# Patient Record
Sex: MALE | Race: WHITE | Employment: OTHER
[De-identification: names, ages, dates, MRNs, and addresses within clinical notes are randomized per-mention and may not be internally consistent; named-entity substitution may affect disease eponyms.]

---

## 2017-01-20 DIAGNOSIS — I25.10 CORONARY ARTERY DISEASE INVOLVING NATIVE CORONARY ARTERY OF NATIVE HEART, ANGINA PRESENCE UNSPECIFIED: ICD-10-CM

## 2017-01-20 DIAGNOSIS — I10 ESSENTIAL HYPERTENSION: ICD-10-CM

## 2017-01-20 RX ORDER — CAPTOPRIL 25 MG/1
TABLET ORAL
Qty: 270 TABLET | Refills: 2 | Status: SHIPPED | OUTPATIENT
Start: 2017-01-20 | End: 2017-10-17 | Stop reason: SDUPTHER

## 2017-02-01 ENCOUNTER — OFFICE VISIT (OUTPATIENT)
Dept: INTERNAL MEDICINE | Facility: CLINIC | Age: 72
End: 2017-02-01

## 2017-02-01 VITALS
RESPIRATION RATE: 14 BRPM | HEIGHT: 71 IN | HEART RATE: 56 BPM | WEIGHT: 253 LBS | DIASTOLIC BLOOD PRESSURE: 60 MMHG | SYSTOLIC BLOOD PRESSURE: 102 MMHG | BODY MASS INDEX: 35.42 KG/M2

## 2017-02-01 DIAGNOSIS — M51.36 DDD (DEGENERATIVE DISC DISEASE), LUMBAR: ICD-10-CM

## 2017-02-01 DIAGNOSIS — Z72.0 TOBACCO USE: ICD-10-CM

## 2017-02-01 DIAGNOSIS — K21.9 GASTROESOPHAGEAL REFLUX DISEASE WITHOUT ESOPHAGITIS: ICD-10-CM

## 2017-02-01 DIAGNOSIS — M1A.0790 IDIOPATHIC CHRONIC GOUT OF FOOT WITHOUT TOPHUS, UNSPECIFIED LATERALITY: ICD-10-CM

## 2017-02-01 DIAGNOSIS — E11.21 DIABETIC NEPHROPATHY WITH PROTEINURIA (HCC): ICD-10-CM

## 2017-02-01 DIAGNOSIS — M96.1 POST LAMINECTOMY SYNDROME: ICD-10-CM

## 2017-02-01 DIAGNOSIS — Z23 NEED FOR VACCINATION WITH 13-POLYVALENT PNEUMOCOCCAL CONJUGATE VACCINE: ICD-10-CM

## 2017-02-01 DIAGNOSIS — D75.89 MACROCYTOSIS WITHOUT ANEMIA: ICD-10-CM

## 2017-02-01 DIAGNOSIS — H93.90 EAR LESION: ICD-10-CM

## 2017-02-01 DIAGNOSIS — G47.33 OBSTRUCTIVE SLEEP APNEA SYNDROME: ICD-10-CM

## 2017-02-01 DIAGNOSIS — I25.10 CORONARY ARTERY DISEASE INVOLVING NATIVE HEART, ANGINA PRESENCE UNSPECIFIED, UNSPECIFIED VESSEL OR LESION TYPE: ICD-10-CM

## 2017-02-01 DIAGNOSIS — J42 CHRONIC BRONCHITIS, UNSPECIFIED CHRONIC BRONCHITIS TYPE (HCC): ICD-10-CM

## 2017-02-01 DIAGNOSIS — I25.10 CORONARY ARTERY DISEASE INVOLVING NATIVE CORONARY ARTERY OF NATIVE HEART WITHOUT ANGINA PECTORIS: ICD-10-CM

## 2017-02-01 DIAGNOSIS — I71.40 ABDOMINAL AORTIC ANEURYSM (AAA) WITHOUT RUPTURE: ICD-10-CM

## 2017-02-01 DIAGNOSIS — E11.21 TYPE 2 DIABETES WITH NEPHROPATHY (HCC): Primary | ICD-10-CM

## 2017-02-01 DIAGNOSIS — R13.12 OROPHARYNGEAL DYSPHAGIA: ICD-10-CM

## 2017-02-01 DIAGNOSIS — I25.10 CORONARY ARTERY DISEASE INVOLVING NATIVE CORONARY ARTERY OF NATIVE HEART, ANGINA PRESENCE UNSPECIFIED: ICD-10-CM

## 2017-02-01 DIAGNOSIS — I10 ESSENTIAL HYPERTENSION: ICD-10-CM

## 2017-02-01 LAB — HBA1C MFR BLD: 7.9 %

## 2017-02-01 PROCEDURE — 3017F COLORECTAL CA SCREEN DOC REV: CPT | Performed by: INTERNAL MEDICINE

## 2017-02-01 PROCEDURE — 99214 OFFICE O/P EST MOD 30 MIN: CPT | Performed by: INTERNAL MEDICINE

## 2017-02-01 PROCEDURE — 90670 PCV13 VACCINE IM: CPT | Performed by: INTERNAL MEDICINE

## 2017-02-01 PROCEDURE — G8427 DOCREV CUR MEDS BY ELIG CLIN: HCPCS | Performed by: INTERNAL MEDICINE

## 2017-02-01 PROCEDURE — 1123F ACP DISCUSS/DSCN MKR DOCD: CPT | Performed by: INTERNAL MEDICINE

## 2017-02-01 PROCEDURE — 83036 HEMOGLOBIN GLYCOSYLATED A1C: CPT | Performed by: INTERNAL MEDICINE

## 2017-02-01 PROCEDURE — 4004F PT TOBACCO SCREEN RCVD TLK: CPT | Performed by: INTERNAL MEDICINE

## 2017-02-01 PROCEDURE — G8598 ASA/ANTIPLAT THER USED: HCPCS | Performed by: INTERNAL MEDICINE

## 2017-02-01 PROCEDURE — G0009 ADMIN PNEUMOCOCCAL VACCINE: HCPCS | Performed by: INTERNAL MEDICINE

## 2017-02-01 PROCEDURE — G8926 SPIRO NO PERF OR DOC: HCPCS | Performed by: INTERNAL MEDICINE

## 2017-02-01 PROCEDURE — G8484 FLU IMMUNIZE NO ADMIN: HCPCS | Performed by: INTERNAL MEDICINE

## 2017-02-01 PROCEDURE — 4040F PNEUMOC VAC/ADMIN/RCVD: CPT | Performed by: INTERNAL MEDICINE

## 2017-02-01 PROCEDURE — G8419 CALC BMI OUT NRM PARAM NOF/U: HCPCS | Performed by: INTERNAL MEDICINE

## 2017-02-01 PROCEDURE — 3023F SPIROM DOC REV: CPT | Performed by: INTERNAL MEDICINE

## 2017-02-01 PROCEDURE — 3045F PR MOST RECENT HEMOGLOBIN A1C LEVEL 7.0-9.0%: CPT | Performed by: INTERNAL MEDICINE

## 2017-02-01 RX ORDER — ISOSORBIDE MONONITRATE 60 MG/1
TABLET, EXTENDED RELEASE ORAL
Qty: 90 TABLET | Refills: 3 | Status: SHIPPED | OUTPATIENT
Start: 2017-02-01 | End: 2017-11-27 | Stop reason: SDUPTHER

## 2017-02-01 RX ORDER — POTASSIUM CHLORIDE 20 MEQ/1
20 TABLET, EXTENDED RELEASE ORAL DAILY
Qty: 90 TABLET | Refills: 3 | Status: SHIPPED | OUTPATIENT
Start: 2017-02-01 | End: 2018-02-21 | Stop reason: SDUPTHER

## 2017-02-01 RX ORDER — OXYMORPHONE HYDROCHLORIDE 20 MG/1
20 TABLET, FILM COATED, EXTENDED RELEASE ORAL 3 TIMES DAILY
COMMUNITY
Start: 2017-01-28 | End: 2017-09-25 | Stop reason: ALTCHOICE

## 2017-02-01 RX ORDER — METFORMIN HYDROCHLORIDE 500 MG/1
500 TABLET, EXTENDED RELEASE ORAL 2 TIMES DAILY
Qty: 180 TABLET | Refills: 3 | Status: SHIPPED | OUTPATIENT
Start: 2017-02-01 | End: 2018-01-22 | Stop reason: SDUPTHER

## 2017-02-01 RX ORDER — RANOLAZINE 500 MG/1
500 TABLET, EXTENDED RELEASE ORAL 2 TIMES DAILY
Qty: 180 TABLET | Refills: 3 | Status: SHIPPED | OUTPATIENT
Start: 2017-02-01 | End: 2018-02-14 | Stop reason: SDUPTHER

## 2017-02-01 RX ORDER — VITAMIN E (DL,TOCOPHERYL ACET) 180 MG
500 CAPSULE ORAL DAILY
Qty: 90 CAPSULE | Refills: 3 | Status: SHIPPED | OUTPATIENT
Start: 2017-02-01 | End: 2018-01-30 | Stop reason: CLARIF

## 2017-02-01 ASSESSMENT — PATIENT HEALTH QUESTIONNAIRE - PHQ9
1. LITTLE INTEREST OR PLEASURE IN DOING THINGS: 0
SUM OF ALL RESPONSES TO PHQ9 QUESTIONS 1 & 2: 0
SUM OF ALL RESPONSES TO PHQ QUESTIONS 1-9: 0
2. FEELING DOWN, DEPRESSED OR HOPELESS: 0

## 2017-02-01 ASSESSMENT — ENCOUNTER SYMPTOMS
HEARTBURN: 0
NAUSEA: 0
VOMITING: 0
CHEST TIGHTNESS: 0
BACK PAIN: 1
COUGH: 0
SHORTNESS OF BREATH: 0
BLOOD IN STOOL: 0

## 2017-02-08 ENCOUNTER — HOSPITAL ENCOUNTER (OUTPATIENT)
Dept: GENERAL RADIOLOGY | Age: 72
Discharge: HOME | End: 2017-02-08
Payer: MEDICARE

## 2017-02-08 ENCOUNTER — HOSPITAL ENCOUNTER (OUTPATIENT)
Age: 72
Discharge: HOME | End: 2017-02-08
Payer: MEDICARE

## 2017-02-08 DIAGNOSIS — M51.36 DDD (DEGENERATIVE DISC DISEASE), LUMBAR: ICD-10-CM

## 2017-02-08 DIAGNOSIS — M96.1 POST LAMINECTOMY SYNDROME: ICD-10-CM

## 2017-02-08 PROCEDURE — 72110 X-RAY EXAM L-2 SPINE 4/>VWS: CPT

## 2017-03-02 RX ORDER — ISOSORBIDE MONONITRATE 60 MG/1
TABLET, EXTENDED RELEASE ORAL
Qty: 90 TABLET | Refills: 2 | Status: SHIPPED | OUTPATIENT
Start: 2017-03-02 | End: 2017-11-27 | Stop reason: SDUPTHER

## 2017-04-24 PROBLEM — H61.009 CHONDRODERMATITIS NODULARIS CHRONICA HELICIS: Status: ACTIVE | Noted: 2017-04-24

## 2017-04-25 DIAGNOSIS — J42 CHRONIC BRONCHITIS, UNSPECIFIED CHRONIC BRONCHITIS TYPE (HCC): ICD-10-CM

## 2017-05-04 PROBLEM — L57.0 ACTINIC KERATOSIS: Status: ACTIVE | Noted: 2017-05-04

## 2017-06-02 ENCOUNTER — TELEPHONE (OUTPATIENT)
Dept: PRIMARY CARE CLINIC | Age: 72
End: 2017-06-02

## 2017-06-02 DIAGNOSIS — I71.40 ABDOMINAL AORTIC ANEURYSM (AAA) WITHOUT RUPTURE: Primary | ICD-10-CM

## 2017-06-02 DIAGNOSIS — M51.36 DDD (DEGENERATIVE DISC DISEASE), LUMBAR: ICD-10-CM

## 2017-06-02 DIAGNOSIS — M96.1 POST LAMINECTOMY SYNDROME: ICD-10-CM

## 2017-06-02 DIAGNOSIS — M16.0 PRIMARY OSTEOARTHRITIS OF BOTH HIPS: ICD-10-CM

## 2017-06-02 DIAGNOSIS — Z95.1 HX OF CABG: ICD-10-CM

## 2017-06-05 PROBLEM — D48.5 NEOPLASM OF UNCERTAIN BEHAVIOR OF SKIN: Status: ACTIVE | Noted: 2017-06-05

## 2017-06-06 ENCOUNTER — OFFICE VISIT (OUTPATIENT)
Dept: PRIMARY CARE CLINIC | Age: 72
End: 2017-06-06
Payer: MEDICARE

## 2017-06-06 VITALS
DIASTOLIC BLOOD PRESSURE: 68 MMHG | BODY MASS INDEX: 32.65 KG/M2 | SYSTOLIC BLOOD PRESSURE: 118 MMHG | HEART RATE: 62 BPM | RESPIRATION RATE: 16 BRPM | WEIGHT: 233.2 LBS | HEIGHT: 71 IN

## 2017-06-06 DIAGNOSIS — M51.36 DDD (DEGENERATIVE DISC DISEASE), LUMBAR: ICD-10-CM

## 2017-06-06 DIAGNOSIS — E66.9 OBESITY (BMI 30-39.9): ICD-10-CM

## 2017-06-06 DIAGNOSIS — Z72.0 TOBACCO USE: ICD-10-CM

## 2017-06-06 DIAGNOSIS — I71.40 ABDOMINAL AORTIC ANEURYSM (AAA) WITHOUT RUPTURE: ICD-10-CM

## 2017-06-06 DIAGNOSIS — I25.10 CORONARY ARTERY DISEASE INVOLVING NATIVE CORONARY ARTERY OF NATIVE HEART WITHOUT ANGINA PECTORIS: ICD-10-CM

## 2017-06-06 DIAGNOSIS — E66.09 NON MORBID OBESITY DUE TO EXCESS CALORIES: ICD-10-CM

## 2017-06-06 DIAGNOSIS — Z86.79 HISTORY OF CONGESTIVE HEART FAILURE: ICD-10-CM

## 2017-06-06 DIAGNOSIS — I10 ESSENTIAL HYPERTENSION: ICD-10-CM

## 2017-06-06 DIAGNOSIS — M16.0 PRIMARY OSTEOARTHRITIS OF BOTH HIPS: ICD-10-CM

## 2017-06-06 DIAGNOSIS — G47.33 OBSTRUCTIVE SLEEP APNEA SYNDROME: ICD-10-CM

## 2017-06-06 DIAGNOSIS — H61.001 CHONDRODERMATITIS NODULARIS CHRONICA HELICIS, RIGHT: ICD-10-CM

## 2017-06-06 DIAGNOSIS — E11.21 DIABETIC NEPHROPATHY WITH PROTEINURIA (HCC): ICD-10-CM

## 2017-06-06 DIAGNOSIS — M96.1 POST LAMINECTOMY SYNDROME: ICD-10-CM

## 2017-06-06 DIAGNOSIS — K63.5 COLON POLYPS: ICD-10-CM

## 2017-06-06 DIAGNOSIS — K21.9 GASTROESOPHAGEAL REFLUX DISEASE WITHOUT ESOPHAGITIS: ICD-10-CM

## 2017-06-06 DIAGNOSIS — I34.0 MITRAL VALVE INSUFFICIENCY, UNSPECIFIED ETIOLOGY: ICD-10-CM

## 2017-06-06 DIAGNOSIS — E11.21 TYPE 2 DIABETES WITH NEPHROPATHY (HCC): ICD-10-CM

## 2017-06-06 LAB — HBA1C MFR BLD: 12.2 %

## 2017-06-06 PROCEDURE — 99214 OFFICE O/P EST MOD 30 MIN: CPT | Performed by: INTERNAL MEDICINE

## 2017-06-06 PROCEDURE — 83036 HEMOGLOBIN GLYCOSYLATED A1C: CPT | Performed by: INTERNAL MEDICINE

## 2017-06-06 PROCEDURE — 1123F ACP DISCUSS/DSCN MKR DOCD: CPT | Performed by: INTERNAL MEDICINE

## 2017-06-06 PROCEDURE — 3017F COLORECTAL CA SCREEN DOC REV: CPT | Performed by: INTERNAL MEDICINE

## 2017-06-06 PROCEDURE — G8417 CALC BMI ABV UP PARAM F/U: HCPCS | Performed by: INTERNAL MEDICINE

## 2017-06-06 PROCEDURE — G8427 DOCREV CUR MEDS BY ELIG CLIN: HCPCS | Performed by: INTERNAL MEDICINE

## 2017-06-06 PROCEDURE — 4040F PNEUMOC VAC/ADMIN/RCVD: CPT | Performed by: INTERNAL MEDICINE

## 2017-06-06 PROCEDURE — 4004F PT TOBACCO SCREEN RCVD TLK: CPT | Performed by: INTERNAL MEDICINE

## 2017-06-06 PROCEDURE — G8598 ASA/ANTIPLAT THER USED: HCPCS | Performed by: INTERNAL MEDICINE

## 2017-06-06 PROCEDURE — 3046F HEMOGLOBIN A1C LEVEL >9.0%: CPT | Performed by: INTERNAL MEDICINE

## 2017-06-06 ASSESSMENT — ENCOUNTER SYMPTOMS
BACK PAIN: 1
VOMITING: 0
VISUAL CHANGE: 0
BLOOD IN STOOL: 0
NAUSEA: 0
SHORTNESS OF BREATH: 0
COUGH: 0

## 2017-06-08 ENCOUNTER — TELEPHONE (OUTPATIENT)
Dept: PRIMARY CARE CLINIC | Age: 72
End: 2017-06-08

## 2017-06-08 DIAGNOSIS — E11.21 TYPE 2 DIABETES WITH NEPHROPATHY (HCC): Primary | ICD-10-CM

## 2017-06-08 DIAGNOSIS — E11.21 DIABETIC NEPHROPATHY WITH PROTEINURIA (HCC): Primary | ICD-10-CM

## 2017-06-08 NOTE — TELEPHONE ENCOUNTER
Patient needs glucometer, order pending, please print and sign. I can give it to him tomorrow when he comes in for diabetic EDU.

## 2017-06-09 DIAGNOSIS — I25.10 CORONARY ARTERY DISEASE INVOLVING NATIVE HEART, ANGINA PRESENCE UNSPECIFIED, UNSPECIFIED VESSEL OR LESION TYPE: ICD-10-CM

## 2017-06-09 DIAGNOSIS — E11.21 TYPE 2 DIABETES WITH NEPHROPATHY (HCC): Primary | ICD-10-CM

## 2017-06-09 RX ORDER — FUROSEMIDE 80 MG
TABLET ORAL
Qty: 90 TABLET | Refills: 1 | Status: SHIPPED | OUTPATIENT
Start: 2017-06-09 | End: 2017-12-06 | Stop reason: SDUPTHER

## 2017-06-12 ENCOUNTER — TELEPHONE (OUTPATIENT)
Dept: PRIMARY CARE CLINIC | Age: 72
End: 2017-06-12

## 2017-06-12 DIAGNOSIS — E11.21 TYPE II DIABETES MELLITUS WITH NEPHROPATHY (HCC): Primary | ICD-10-CM

## 2017-06-12 RX ORDER — SYRING-NEEDL,DISP,INSUL,0.3 ML 30 GX5/16"
1 SYRINGE, EMPTY DISPOSABLE MISCELLANEOUS 3 TIMES DAILY
Qty: 1 DEVICE | Refills: 0 | Status: SHIPPED | OUTPATIENT
Start: 2017-06-12 | End: 2020-05-07 | Stop reason: SDUPTHER

## 2017-06-12 RX ORDER — LANCETS 30 GAUGE
EACH MISCELLANEOUS
Qty: 300 EACH | Refills: 3 | Status: SHIPPED | OUTPATIENT
Start: 2017-06-12 | End: 2018-07-24 | Stop reason: SDUPTHER

## 2017-06-12 RX ORDER — GLUCOSAMINE HCL/CHONDROITIN SU 500-400 MG
CAPSULE ORAL
Qty: 300 STRIP | Refills: 3 | Status: SHIPPED | OUTPATIENT
Start: 2017-06-12 | End: 2018-06-25 | Stop reason: SDUPTHER

## 2017-06-13 ENCOUNTER — CARE COORDINATION (OUTPATIENT)
Dept: PRIMARY CARE CLINIC | Age: 72
End: 2017-06-13

## 2017-06-30 ENCOUNTER — TELEPHONE (OUTPATIENT)
Dept: PRIMARY CARE CLINIC | Age: 72
End: 2017-06-30

## 2017-08-27 DIAGNOSIS — M1A.0710 IDIOPATHIC CHRONIC GOUT OF RIGHT FOOT WITHOUT TOPHUS: ICD-10-CM

## 2017-08-28 RX ORDER — ALLOPURINOL 100 MG/1
TABLET ORAL
Qty: 90 TABLET | Refills: 1 | Status: SHIPPED | OUTPATIENT
Start: 2017-08-28 | End: 2018-02-21 | Stop reason: SDUPTHER

## 2017-08-29 DIAGNOSIS — I25.10 CORONARY ARTERY DISEASE INVOLVING NATIVE HEART, ANGINA PRESENCE UNSPECIFIED, UNSPECIFIED VESSEL OR LESION TYPE: ICD-10-CM

## 2017-08-29 DIAGNOSIS — E11.21 TYPE 2 DIABETES MELLITUS WITH DIABETIC NEPHROPATHY (HCC): ICD-10-CM

## 2017-08-29 RX ORDER — ATORVASTATIN CALCIUM 40 MG/1
TABLET, FILM COATED ORAL
Qty: 90 TABLET | Refills: 1 | Status: SHIPPED | OUTPATIENT
Start: 2017-08-29 | End: 2018-02-21 | Stop reason: SDUPTHER

## 2017-08-29 RX ORDER — ATENOLOL 25 MG/1
TABLET ORAL
Qty: 90 TABLET | Refills: 1 | Status: SHIPPED | OUTPATIENT
Start: 2017-08-29 | End: 2018-02-21 | Stop reason: SDUPTHER

## 2017-09-22 DIAGNOSIS — K21.9 GASTROESOPHAGEAL REFLUX DISEASE, ESOPHAGITIS PRESENCE NOT SPECIFIED: ICD-10-CM

## 2017-09-22 RX ORDER — LANSOPRAZOLE 30 MG/1
30 CAPSULE, DELAYED RELEASE ORAL DAILY
Qty: 90 CAPSULE | Refills: 1 | Status: SHIPPED | OUTPATIENT
Start: 2017-09-22 | End: 2018-03-26 | Stop reason: SDUPTHER

## 2017-09-28 ENCOUNTER — CARE COORDINATION (OUTPATIENT)
Dept: CARE COORDINATION | Age: 72
End: 2017-09-28

## 2017-10-16 ENCOUNTER — OFFICE VISIT (OUTPATIENT)
Dept: PRIMARY CARE CLINIC | Age: 72
End: 2017-10-16
Payer: MEDICARE

## 2017-10-16 VITALS
WEIGHT: 229.6 LBS | BODY MASS INDEX: 32.14 KG/M2 | HEIGHT: 71 IN | RESPIRATION RATE: 16 BRPM | SYSTOLIC BLOOD PRESSURE: 102 MMHG | HEART RATE: 68 BPM | DIASTOLIC BLOOD PRESSURE: 62 MMHG

## 2017-10-16 DIAGNOSIS — E66.9 OBESITY (BMI 30-39.9): ICD-10-CM

## 2017-10-16 DIAGNOSIS — Z72.0 TOBACCO USE: ICD-10-CM

## 2017-10-16 DIAGNOSIS — E11.21 TYPE 2 DIABETES WITH NEPHROPATHY (HCC): Primary | ICD-10-CM

## 2017-10-16 DIAGNOSIS — E78.5 HYPERLIPIDEMIA, UNSPECIFIED HYPERLIPIDEMIA TYPE: ICD-10-CM

## 2017-10-16 DIAGNOSIS — M51.36 DDD (DEGENERATIVE DISC DISEASE), LUMBAR: ICD-10-CM

## 2017-10-16 DIAGNOSIS — M96.1 POST LAMINECTOMY SYNDROME: ICD-10-CM

## 2017-10-16 DIAGNOSIS — I71.40 ABDOMINAL AORTIC ANEURYSM (AAA) WITHOUT RUPTURE: ICD-10-CM

## 2017-10-16 DIAGNOSIS — M16.0 PRIMARY OSTEOARTHRITIS OF BOTH HIPS: ICD-10-CM

## 2017-10-16 DIAGNOSIS — L57.0 ACTINIC KERATOSIS: ICD-10-CM

## 2017-10-16 DIAGNOSIS — Z95.1 HX OF CABG: ICD-10-CM

## 2017-10-16 DIAGNOSIS — I25.10 CORONARY ARTERY DISEASE INVOLVING NATIVE HEART, ANGINA PRESENCE UNSPECIFIED, UNSPECIFIED VESSEL OR LESION TYPE: ICD-10-CM

## 2017-10-16 DIAGNOSIS — M1A.0710 IDIOPATHIC CHRONIC GOUT OF RIGHT FOOT WITHOUT TOPHUS: ICD-10-CM

## 2017-10-16 DIAGNOSIS — E66.09 NON MORBID OBESITY DUE TO EXCESS CALORIES: ICD-10-CM

## 2017-10-16 DIAGNOSIS — K21.9 GASTROESOPHAGEAL REFLUX DISEASE WITHOUT ESOPHAGITIS: ICD-10-CM

## 2017-10-16 DIAGNOSIS — I25.10 CORONARY ARTERY DISEASE INVOLVING NATIVE CORONARY ARTERY OF NATIVE HEART WITHOUT ANGINA PECTORIS: ICD-10-CM

## 2017-10-16 DIAGNOSIS — E11.21 DIABETIC NEPHROPATHY WITH PROTEINURIA (HCC): ICD-10-CM

## 2017-10-16 DIAGNOSIS — I10 ESSENTIAL HYPERTENSION: ICD-10-CM

## 2017-10-16 DIAGNOSIS — I50.22 HEART FAILURE, SYSTOLIC, CHRONIC (HCC): ICD-10-CM

## 2017-10-16 DIAGNOSIS — G47.31 CENTRAL SLEEP APNEA: ICD-10-CM

## 2017-10-16 DIAGNOSIS — I34.0 NON-RHEUMATIC MITRAL REGURGITATION: ICD-10-CM

## 2017-10-16 LAB — HBA1C MFR BLD: 6.2 %

## 2017-10-16 PROCEDURE — 3017F COLORECTAL CA SCREEN DOC REV: CPT | Performed by: INTERNAL MEDICINE

## 2017-10-16 PROCEDURE — 83036 HEMOGLOBIN GLYCOSYLATED A1C: CPT | Performed by: INTERNAL MEDICINE

## 2017-10-16 PROCEDURE — 4040F PNEUMOC VAC/ADMIN/RCVD: CPT | Performed by: INTERNAL MEDICINE

## 2017-10-16 PROCEDURE — G8427 DOCREV CUR MEDS BY ELIG CLIN: HCPCS | Performed by: INTERNAL MEDICINE

## 2017-10-16 PROCEDURE — 3046F HEMOGLOBIN A1C LEVEL >9.0%: CPT | Performed by: INTERNAL MEDICINE

## 2017-10-16 PROCEDURE — G8598 ASA/ANTIPLAT THER USED: HCPCS | Performed by: INTERNAL MEDICINE

## 2017-10-16 PROCEDURE — 99214 OFFICE O/P EST MOD 30 MIN: CPT | Performed by: INTERNAL MEDICINE

## 2017-10-16 PROCEDURE — 1123F ACP DISCUSS/DSCN MKR DOCD: CPT | Performed by: INTERNAL MEDICINE

## 2017-10-16 PROCEDURE — G8482 FLU IMMUNIZE ORDER/ADMIN: HCPCS | Performed by: INTERNAL MEDICINE

## 2017-10-16 PROCEDURE — 4004F PT TOBACCO SCREEN RCVD TLK: CPT | Performed by: INTERNAL MEDICINE

## 2017-10-16 PROCEDURE — G8417 CALC BMI ABV UP PARAM F/U: HCPCS | Performed by: INTERNAL MEDICINE

## 2017-10-16 ASSESSMENT — ENCOUNTER SYMPTOMS
VOMITING: 0
SHORTNESS OF BREATH: 1
BLOOD IN STOOL: 0
COUGH: 0
HEARTBURN: 1
BACK PAIN: 1
DIARRHEA: 1
NAUSEA: 0
ROS SKIN COMMENTS: NO NEW SKIN LESIONS

## 2017-10-16 NOTE — PROGRESS NOTES
 Heart Disease Brother     Asthma Maternal Grandmother     Asthma Paternal Grandmother        Social History   Substance Use Topics    Smoking status: Current Every Day Smoker     Packs/day: 1.00     Years: 40.00     Types: Cigarettes    Smokeless tobacco: Never Used    Alcohol use No      Current Outpatient Prescriptions   Medication Sig Dispense Refill    lansoprazole (PREVACID) 30 MG delayed release capsule Take 1 capsule by mouth daily 90 capsule 1    atorvastatin (LIPITOR) 40 MG tablet TAKE ONE TABLET BY MOUTH DAILY 90 tablet 1    atenolol (TENORMIN) 25 MG tablet TAKE ONE TABLET BY MOUTH DAILY 90 tablet 1    allopurinol (ZYLOPRIM) 100 MG tablet TAKE ONE TABLET BY MOUTH DAILY 90 tablet 1    Blood Glucose Monitoring Suppl ROMY Glucometer- Free Style Lite 1 Device 0    Lancet Device MISC 1 Device by Miscell. (Med.Supl.;Non-Drugs) route 3 times daily To check blood sugars. Free Style Lite 1 Device 0    Lancets MISC Free Style Lite, Use 3 daily. Insulin Dependent 300 each 3    Glucose Blood (BLOOD GLUCOSE TEST STRIPS) STRP Free Style Lite, Use 3 daily.  Insulin Dependent 300 strip 3    furosemide (LASIX) 80 MG tablet TAKE ONE TABLET BY MOUTH DAILY 90 tablet 1    Insulin Pen Needle 32G X 4 MM MISC 1 each by Does not apply route daily 100 each 3    DULERA 100-5 MCG/ACT inhaler INHALE TWO PUFFS BY MOUTH TWICE A DAY 4 Inhaler 3    isosorbide mononitrate (IMDUR) 60 MG extended release tablet TAKE ONE TABLET BY MOUTH DAILY 90 tablet 2    levofloxacin (LEVAQUIN) 500 MG tablet       metFORMIN (GLUCOPHAGE XR) 500 MG extended release tablet Take 1 tablet by mouth 2 times daily 180 tablet 3    potassium chloride (KLOR-CON M20) 20 MEQ extended release tablet Take 1 tablet by mouth daily 90 tablet 3    ranolazine (RANEXA) 500 MG extended release tablet Take 1 tablet by mouth 2 times daily 180 tablet 3    Magnesium Oxide 500 MG CAPS Take 500 mg by mouth daily 90 capsule 3    isosorbide mononitrate (IMDUR) 60 MG extended release tablet TAKE ONE TABLET BY MOUTH DAILY 90 tablet 3    captopril (CAPOTEN) 25 MG tablet TAKE ONE TABLET BY MOUTH THREE TIMES A  tablet 2    benzonatate (TESSALON PERLES) 100 MG capsule Take 1 capsule by mouth 3 times daily as needed for Cough 30 capsule 0    budesonide-formoterol (SYMBICORT) 160-4.5 MCG/ACT AERO Inhale 2 puffs into the lungs 2 times daily 1 Inhaler 3    fluticasone-salmeterol (ADVAIR DISKUS) 250-50 MCG/DOSE AEPB Inhale 1 puff into the lungs every 12 hours 60 each 3    morphine sulfate ER (MS CONTIN) 30 MG CR tablet       aspirin 325 MG tablet Take 325 mg by mouth daily. No current facility-administered medications for this visit. Allergies   Allergen Reactions    Baclofen     Iodides     Methadone        Health Maintenance   Topic Date Due    Lipid screen  11/18/2016    Diabetic retinal exam  04/12/2017    DTaP/Tdap/Td vaccine (1 - Tdap) 02/14/2018 (Originally 11/25/1964)    Diabetic foot exam  02/01/2018    Pneumococcal low/med risk (2 of 2 - PPSV23) 02/01/2018    Diabetic hemoglobin A1C test  06/06/2018    Colon cancer screen colonoscopy  05/13/2021    Zostavax vaccine  Addressed    Flu vaccine  Completed       Subjective:      Review of Systems   Constitutional: Positive for unexpected weight change (Continues to lose weight via dietary efforts). Respiratory: Positive for shortness of breath (Chronic dyspnea on exertion). Negative for cough. Cardiovascular: Negative for chest pain and palpitations. Gastrointestinal: Positive for diarrhea (Occasional loose stool) and heartburn (Rare). Negative for blood in stool, nausea and vomiting. Endocrine: Negative for polydipsia, polyphagia and polyuria. Genitourinary: Negative for difficulty urinating and hematuria. Musculoskeletal: Positive for arthralgias, back pain and myalgias.         The patient has chronic arthralgias and myalgias   Continues with pain management   Skin: No new skin lesions       Objective:     Physical Exam   Constitutional: He is oriented to person, place, and time. No distress. HENT:   Head: Normocephalic. Nose: Nose normal.   Eyes: Conjunctivae are normal. No scleral icterus. Neck: Neck supple. No tracheal deviation present. No thyromegaly present. Cardiovascular: Normal rate and regular rhythm. Pulmonary/Chest: Effort normal and breath sounds normal. He has no wheezes. He has no rales. Abdominal: Soft. Bowel sounds are normal. He exhibits no distension. There is no tenderness. Musculoskeletal: He exhibits no edema or tenderness. Lymphadenopathy:     He has no cervical adenopathy. Neurological: He is alert and oriented to person, place, and time. Skin: Skin is warm and dry. He is not diaphoretic. No skin lesions  His ear has healed well   Psychiatric: He has a normal mood and affect. Judgment normal.   Vitals reviewed. Diabetic Foot Exam was completed:  Sensation intact  Color and temp satisfactory  Pulses present / capillary fill satisfactory   Nails trimmed, no onychomycosis   No wounds, pressure sores or rash    Quality & Risk Score Accuracy - MEDICARE ADVANTAGE    Last edited 10/16/17 18:38 EDT by Juan Lezama DO         /62   Pulse 68   Resp 16   Ht 5' 11\" (1.803 m)   Wt 229 lb 9.6 oz (104.1 kg)   BMI 32.02 kg/m²     Assessment:      1. Type 2 diabetes with nephropathy (HCC)  POCT glycosylated hemoglobin (Hb A1C)    Lipid, Fasting    Comprehensive Metabolic Panel    CBC Auto Differential    Lipid Panel    TSH without Reflex   2. Abdominal aortic aneurysm (AAA) without rupture ([address]     3. Essential hypertension     4. Hyperlipidemia, unspecified hyperlipidemia type     5. Primary osteoarthritis of both hips     6. Gastroesophageal reflux disease without esophagitis     7. Post laminectomy syndrome     8. Diabetic nephropathy with proteinuria (HCC)  Lipid, Fasting   9. Non-rheumatic mitral regurgitation     10.

## 2017-10-17 DIAGNOSIS — I25.10 CORONARY ARTERY DISEASE INVOLVING NATIVE CORONARY ARTERY OF NATIVE HEART, ANGINA PRESENCE UNSPECIFIED: ICD-10-CM

## 2017-10-17 DIAGNOSIS — I10 ESSENTIAL HYPERTENSION: ICD-10-CM

## 2017-10-17 RX ORDER — CAPTOPRIL 25 MG/1
TABLET ORAL
Qty: 270 TABLET | Refills: 1 | Status: SHIPPED | OUTPATIENT
Start: 2017-10-17 | End: 2018-02-21 | Stop reason: SDUPTHER

## 2017-10-17 NOTE — TELEPHONE ENCOUNTER
Coronary artery disease involving native coronary artery of native heart without angina pectoris     Ear lesion     Chondrodermatitis nodularis chronica helicis     Actinic keratosis     Neoplasm of uncertain behavior of skin     CHF (congestive heart failure) (HCC)     CAD (coronary artery disease)     Obesity (BMI 30-39. 9)     History of congestive heart failure     Central sleep apnea

## 2017-10-18 ENCOUNTER — CARE COORDINATION (OUTPATIENT)
Dept: CARE COORDINATION | Age: 72
End: 2017-10-18

## 2017-10-18 NOTE — CARE COORDINATION
Attempted to reach, unable to leave message, voice recorder for screening phone calls, tried to identify this caller unsuccessfully.

## 2017-10-25 ENCOUNTER — CARE COORDINATION (OUTPATIENT)
Dept: CARE COORDINATION | Age: 72
End: 2017-10-25

## 2017-10-26 NOTE — TELEPHONE ENCOUNTER
Last OV 06/06/2017      Health Maintenance   Topic Date Due    Lipid screen  11/18/2016    DTaP/Tdap/Td vaccine (1 - Tdap) 02/14/2018 (Originally 11/25/1964)    Diabetic retinal exam  10/16/2018 (Originally 4/12/2017)    Diabetic foot exam  02/01/2018    Pneumococcal low/med risk (2 of 2 - PPSV23) 02/01/2018    Diabetic hemoglobin A1C test  10/16/2018    Colon cancer screen colonoscopy  05/13/2021    Zostavax vaccine  Addressed    Flu vaccine  Completed             (applicable per patient's age: Cancer Screenings, Depression Screening, Fall Risk Screening, Immunizations)    Hemoglobin A1C (%)   Date Value   10/16/2017 6.2   06/06/2017 12.2   02/01/2017 7.9     LDL Calculated (mg/dL)   Date Value   11/18/2015 74      (goal A1C is < 7)   (goal LDL is <100) need 30-50% reduction from baseline     BP Readings from Last 3 Encounters:   10/16/17 102/62   09/25/17 101/65   06/06/17 118/68    (goal /80)      All Future Testing planned in [facility]:  Lab Frequency Next Occurrence   Comprehensive Metabolic Panel Once 72/15/3324   Lipid Panel Once 02/01/2018   Lipid, Fasting Once 11/15/2017   Comprehensive Metabolic Panel Once 26/50/6753   CBC Auto Differential Once 10/16/2017   Lipid Panel Once 10/16/2017   TSH without Reflex Once 10/16/2017       Next Visit Date:  Future Appointments  Date Time Provider Diandra Horton   8/20/2018 8:20 AM DO Maryana Dunaway            Patient Active Problem List:     AAA (abdominal aortic aneurysm) ([address]     Hypertension     Hyperlipidemia     OA (osteoarthritis) of hip     Colon polyps     Gout     GERD (gastroesophageal reflux disease)     Oropharyngeal dysphagia     Post laminectomy syndrome     Tobacco use     Ventral hernia     Obese     Chronic back pain sees Dr Sharda Yip     DDD (degenerative disc disease), lumbar     Diabetic nephropathy with proteinuria ([address]     Type 2 diabetes with nephropathy ([address]     Mitral regurgitation     Hx of CABG

## 2017-11-08 ENCOUNTER — TELEPHONE (OUTPATIENT)
Dept: PRIMARY CARE CLINIC | Age: 72
End: 2017-11-08

## 2017-11-09 ENCOUNTER — TELEPHONE (OUTPATIENT)
Dept: PRIMARY CARE CLINIC | Age: 72
End: 2017-11-09

## 2017-11-09 ENCOUNTER — OFFICE VISIT (OUTPATIENT)
Dept: PRIMARY CARE CLINIC | Age: 72
End: 2017-11-09
Payer: MEDICARE

## 2017-11-09 VITALS
HEIGHT: 71 IN | HEART RATE: 70 BPM | DIASTOLIC BLOOD PRESSURE: 62 MMHG | RESPIRATION RATE: 16 BRPM | BODY MASS INDEX: 32.06 KG/M2 | SYSTOLIC BLOOD PRESSURE: 98 MMHG | WEIGHT: 229 LBS

## 2017-11-09 DIAGNOSIS — K58.0 IRRITABLE BOWEL SYNDROME WITH DIARRHEA: Primary | ICD-10-CM

## 2017-11-09 PROCEDURE — G8427 DOCREV CUR MEDS BY ELIG CLIN: HCPCS | Performed by: NURSE PRACTITIONER

## 2017-11-09 PROCEDURE — 4040F PNEUMOC VAC/ADMIN/RCVD: CPT | Performed by: NURSE PRACTITIONER

## 2017-11-09 PROCEDURE — G8598 ASA/ANTIPLAT THER USED: HCPCS | Performed by: NURSE PRACTITIONER

## 2017-11-09 PROCEDURE — 4004F PT TOBACCO SCREEN RCVD TLK: CPT | Performed by: NURSE PRACTITIONER

## 2017-11-09 PROCEDURE — G8482 FLU IMMUNIZE ORDER/ADMIN: HCPCS | Performed by: NURSE PRACTITIONER

## 2017-11-09 PROCEDURE — 3017F COLORECTAL CA SCREEN DOC REV: CPT | Performed by: NURSE PRACTITIONER

## 2017-11-09 PROCEDURE — 99214 OFFICE O/P EST MOD 30 MIN: CPT | Performed by: NURSE PRACTITIONER

## 2017-11-09 PROCEDURE — G8417 CALC BMI ABV UP PARAM F/U: HCPCS | Performed by: NURSE PRACTITIONER

## 2017-11-09 PROCEDURE — 1123F ACP DISCUSS/DSCN MKR DOCD: CPT | Performed by: NURSE PRACTITIONER

## 2017-11-09 ASSESSMENT — ENCOUNTER SYMPTOMS
VOMITING: 0
BLOATING: 1
FLATUS: 0
DIARRHEA: 1
COUGH: 0
ABDOMINAL PAIN: 1

## 2017-11-09 NOTE — PROGRESS NOTES
[address]  [address]  [facility] [address]  Dept: [phone]  Dept Fax: [phone]    Magdalene Arechiga is a 66 y.o. male who presents today for his medical conditions/complaints as noted below. Magdalene Arechiga is c/o of Diarrhea (x 6 weeks ) and Abdominal Pain (x 1 month )      HPI:     Presents with wife  6 weeks of diarrhea, abdominal pain  Having to run to bathroom frequently  Has tried multiple OTC    States that prior to this flare he tends to be more constipated d/t chronic pain meds    States the only thing that helped to slow the diarrhea was eating popcorn      Diarrhea    This is a recurrent problem. The current episode started more than 1 month ago. The problem occurs 5 to 10 times per day. The problem has been unchanged. The stool consistency is described as watery and mucous. The patient states that diarrhea awakens him from sleep. Associated symptoms include abdominal pain and bloating. Pertinent negatives include no arthralgias, chills, coughing, fever, headaches, increased  flatus, myalgias, sweats, URI, vomiting or weight loss. Nothing aggravates the symptoms. There are no known risk factors. He has tried anti-motility drug, analgesics, bismuth subsalicylate, change of diet and increased fluids for the symptoms. The treatment provided mild relief. Abdominal Pain   Associated symptoms include diarrhea. Pertinent negatives include no arthralgias, fever, flatus, headaches, myalgias, vomiting or weight loss.        Past Medical History:   Diagnosis Date    AAA (abdominal aortic aneurysm) ([facility])     Dr Mohinder Gamino CAD (coronary artery disease)     Dr Abdelrahman Martinez Carotid artery stenosis     Central sleep apnea 10/16/2017    [facility]est[facility]) [facility]     Colon polyps     Dr Abby Islas DDD (degenerative disc disease), lumbar 11/3/2015    Diabetic nephropathy with proteinuria ([facility] 11/20/2015    GERD (gastroesophageal reflux disease) 7/10/2014    Gout 2/7/2014    Heart attack     Hx of CABG 12/9/2015    Hyperlipidemia     Hypertension     Mitral regurgitation 12/9/2015    Obese 2/19/2015    Oropharyngeal dysphagia 7/10/2014    Post laminectomy syndrome 9/24/2014    Tobacco use 11/25/2014    Type 2 diabetes with nephropathy ([facility] 11/20/2015      Past Surgical History:   Procedure Laterality Date    ABDOMINAL AORTIC ANEURYSM REPAIR  12/02    ARTERIAL BYPASS SURGRY  1984    Double    BACK SURGERY  2000, 2003    lumbar    COLONOSCOPY      CORONARY ARTERY BYPASS GRAFT      HERNIA REPAIR  12/1980    Hiatal    HERNIA REPAIR Left 10/1997    Ventral    KNEE SURGERY Left     KNEE SURGERY Right 2006    KNEE SURGERY Left 2008    MALIGNANT SKIN LESION EXCISION Right 04/27/2017    Dr. Shawna Severin Left 2012    TUMOR REMOVAL Right     neck    WRIST GANGLION EXCISION Left 2008       Family History   Problem Relation Age of Onset    Cancer Mother      breast    High Blood Pressure Father     Heart Disease Father     Stroke Father     Heart Disease Brother     Asthma Maternal Grandmother     Asthma Paternal Grandmother        Social History   Substance Use Topics    Smoking status: Current Every Day Smoker     Packs/day: 1.00     Years: 40.00     Types: Cigarettes    Smokeless tobacco: Never Used    Alcohol use No      Current Outpatient Prescriptions   Medication Sig Dispense Refill    rifaximin (XIFAXAN) 550 MG tablet Take 1 tablet by mouth 2 times daily for 14 days 28 tablet 0    metFORMIN (GLUCOPHAGE) 500 MG tablet TAKE ONE TABLET BY MOUTH THREE TIMES A DAY 90 tablet 2    captopril (CAPOTEN) 25 MG tablet TAKE ONE TABLET BY MOUTH THREE TIMES A  tablet 1    lansoprazole (PREVACID) 30 MG delayed release capsule Take 1 capsule by mouth daily 90 capsule 1    atorvastatin (LIPITOR) 40 MG tablet TAKE ONE TABLET BY MOUTH DAILY 90 tablet 1    atenolol (TENORMIN) 25 MG tablet TAKE ONE TABLET BY MOUTH DAILY 90 tablet 1    allopurinol (ZYLOPRIM) 100 MG tablet TAKE ONE TABLET BY MOUTH DAILY 90 tablet 1    Blood Glucose Monitoring Suppl ROMY Glucometer- Free Style Lite 1 Device 0    Lancet Device MISC 1 Device by [employer]. (Med.Supl.;Non-Drugs) route 3 times daily To check blood sugars. Free Style Lite 1 Device 0    Lancets MISC Free Style Lite, Use 3 daily. Insulin Dependent 300 each 3    Glucose Blood (BLOOD GLUCOSE TEST STRIPS) STRP Free Style Lite, Use 3 daily. Insulin Dependent 300 strip 3    furosemide (LASIX) 80 MG tablet TAKE ONE TABLET BY MOUTH DAILY 90 tablet 1    DULERA 100-5 MCG/ACT inhaler INHALE TWO PUFFS BY MOUTH TWICE A DAY 4 Inhaler 3    isosorbide mononitrate (IMDUR) 60 MG extended release tablet TAKE ONE TABLET BY MOUTH DAILY 90 tablet 2    levofloxacin (LEVAQUIN) 500 MG tablet       metFORMIN (GLUCOPHAGE XR) 500 MG extended release tablet Take 1 tablet by mouth 2 times daily 180 tablet 3    potassium chloride (KLOR-CON M20) 20 MEQ extended release tablet Take 1 tablet by mouth daily 90 tablet 3    ranolazine (RANEXA) 500 MG extended release tablet Take 1 tablet by mouth 2 times daily 180 tablet 3    Magnesium Oxide 500 MG CAPS Take 500 mg by mouth daily 90 capsule 3    isosorbide mononitrate (IMDUR) 60 MG extended release tablet TAKE ONE TABLET BY MOUTH DAILY 90 tablet 3    benzonatate (TESSALON PERLES) 100 MG capsule Take 1 capsule by mouth 3 times daily as needed for Cough 30 capsule 0    budesonide-formoterol (SYMBICORT) 160-4.5 MCG/ACT AERO Inhale 2 puffs into the lungs 2 times daily 1 Inhaler 3    fluticasone-salmeterol (ADVAIR DISKUS) 250-50 MCG/DOSE AEPB Inhale 1 puff into the lungs every 12 hours 60 each 3    morphine sulfate ER (MS CONTIN) 30 MG CR tablet       aspirin 325 MG tablet Take 325 mg by mouth daily. No current facility-administered medications for this visit.       Allergies   Allergen Reactions    Baclofen     Iodides     Methadone        Health Maintenance Electronically signed by Paz Tao CNP on 11/9/2017 at 8:48 AM

## 2017-11-09 NOTE — LETTER
[facility]  [address]  [address]  [place] [address]  Phone: [phone]  Fax: [phone] [facility]        November 9, 2017     Patient: Jeremiah Urena   YOB: 1945   Date of Visit: 11/9/2017       To Whom It May Concern: It is my medical opinion that Gerardo Carft may return to work on 11/14/17. If you have any questions or concerns, please don't hesitate to call.     Sincerely,        Geoffrey Crespo, CNP

## 2017-11-21 RX ORDER — RIFAXIMIN 550 MG/1
TABLET ORAL
Qty: 28 TABLET | Refills: 0 | Status: SHIPPED | OUTPATIENT
Start: 2017-11-21 | End: 2017-12-03 | Stop reason: SDUPTHER

## 2017-11-21 NOTE — TELEPHONE ENCOUNTER
Last seen 11/9/17-Hakeem  Last fill 11/9/17    Health Maintenance   Topic Date Due    Smoker: low dose lung CT screening  11/25/1999    Lipid screen  11/18/2016    DTaP/Tdap/Td vaccine (1 - Tdap) 02/14/2018 (Originally 11/25/1964)    Diabetic retinal exam  10/16/2018 (Originally 4/12/2017)    Diabetic foot exam  02/01/2018    Pneumococcal low/med risk (2 of 2 - PPSV23) 02/01/2018    Diabetic hemoglobin A1C test  10/16/2018    Colon cancer screen colonoscopy  05/13/2021    Zostavax vaccine  Addressed    Flu vaccine  Completed             (applicable per patient's age: Cancer Screenings, Depression Screening, Fall Risk Screening, Immunizations)    Hemoglobin A1C (%)   Date Value   10/16/2017 6.2   06/06/2017 12.2   02/01/2017 7.9     LDL Calculated (mg/dL)   Date Value   11/18/2015 74      (goal A1C is < 7)   (goal LDL is <100) need 30-50% reduction from baseline     BP Readings from Last 3 Encounters:   11/09/17 98/62   10/16/17 102/62   09/25/17 101/65    (goal /80)      All Future Testing planned in [facility]:  Lab Frequency Next Occurrence   Comprehensive Metabolic Panel Once 07/94/2692   Lipid Panel Once 02/01/2018   Lipid, Fasting Once 11/15/2017   Comprehensive Metabolic Panel Once 76/61/9591   CBC Auto Differential Once 10/16/2017   Lipid Panel Once 10/16/2017   TSH without Reflex Once 10/16/2017       Next Visit Date:  Future Appointments  Date Time Provider Diandra Horton   8/20/2018 8:20 AM Robert Hassan DO [facility]            Patient Active Problem List:     AAA (abdominal aortic aneurysm) (HCC)     Hypertension     Hyperlipidemia     OA (osteoarthritis) of hip     Colon polyps     Gout     GERD (gastroesophageal reflux disease)     Oropharyngeal dysphagia     Post laminectomy syndrome     Tobacco use     Ventral hernia     Obese     Chronic back pain sees Dr Georgia El     DDD (degenerative disc disease), lumbar     Diabetic nephropathy with proteinuria ([facility]     Type 2

## 2017-11-27 ENCOUNTER — CARE COORDINATION (OUTPATIENT)
Dept: CARE COORDINATION | Age: 72
End: 2017-11-27

## 2017-11-27 RX ORDER — ISOSORBIDE MONONITRATE 60 MG/1
TABLET, EXTENDED RELEASE ORAL
Qty: 90 TABLET | Refills: 1 | Status: SHIPPED | OUTPATIENT
Start: 2017-11-27 | End: 2018-05-30 | Stop reason: SDUPTHER

## 2017-11-27 NOTE — TELEPHONE ENCOUNTER
Health Maintenance   Topic Date Due    Smoker: low dose lung CT screening  11/25/1999    Lipid screen  11/18/2016    DTaP/Tdap/Td vaccine (1 - Tdap) 02/14/2018 (Originally 11/25/1964)    Diabetic retinal exam  10/16/2018 (Originally 4/12/2017)    Diabetic foot exam  02/01/2018    Pneumococcal low/med risk (2 of 2 - PPSV23) 02/01/2018    Diabetic hemoglobin A1C test  10/16/2018    Colon cancer screen colonoscopy  05/13/2021    Zostavax vaccine  Addressed    Flu vaccine  Completed             (applicable per patient's age: Cancer Screenings, Depression Screening, Fall Risk Screening, Immunizations)    Hemoglobin A1C (%)   Date Value   10/16/2017 6.2   06/06/2017 12.2   02/01/2017 7.9     LDL Calculated (mg/dL)   Date Value   11/18/2015 74      (goal A1C is < 7)   (goal LDL is <100) need 30-50% reduction from baseline     BP Readings from Last 3 Encounters:   11/09/17 98/62   10/16/17 102/62   09/25/17 101/65    (goal /80)      All Future Testing planned in [facility]:  Lab Frequency Next Occurrence   Comprehensive Metabolic Panel Once 14/86/5785   Lipid Panel Once 02/01/2018   Lipid, Fasting Once 11/15/2017   Comprehensive Metabolic Panel Once 48/83/0499   CBC Auto Differential Once 10/16/2017   Lipid Panel Once 10/16/2017   TSH without Reflex Once 10/16/2017       Next Visit Date:  Future Appointments  Date Time Provider Diandra Horton   8/20/2018 8:20 AM Robert Hassan DO [facility]            Patient Active Problem List:     AAA (abdominal aortic aneurysm) ([address]     Hypertension     Hyperlipidemia     OA (osteoarthritis) of hip     Colon polyps     Gout     GERD (gastroesophageal reflux disease)     Oropharyngeal dysphagia     Post laminectomy syndrome     Tobacco use     Ventral hernia     Obese     Chronic back pain sees Dr Georgia El     DDD (degenerative disc disease), lumbar     Diabetic nephropathy with proteinuria ([address]     Type 2 diabetes with nephropathy ([address]     Mitral

## 2017-11-27 NOTE — CARE COORDINATION
Ambulatory Care Coordination Note  11/27/2017  CM Risk Score: 3  [place] Mortality Risk Score: 14.66    ACC: Yara Richard, KIMBERLI    Summary Note: Spoke with wife, states he is at the pharmacy picking up RX, she states his sugars are good, diet is better, he does not exercise due to back pain. Will check back on patient and hopefully graduate. Care Coordination Interventions    Program Enrollment:  Rising Risk  Referral from Primary Care Provider:  Yes  Suggested Interventions and Community Resources  Diabetes Education: In Process  Medication Assistance Program:  In Process  Specialty Services Referral:  In Process (Comment: diabetic education)  Other Services:  Completed  Other Services or Interventions:  instructed on insulin start         Goals Addressed     None          Prior to Admission medications    Medication Sig Start Date End Date Taking? Authorizing Provider   XIFAXAN 550 MG tablet TAKE ONE TABLET BY MOUTH TWICE A DAY FOR 14 DAYS 11/21/17   Angelic Stevenson CNP   metFORMIN (GLUCOPHAGE) 500 MG tablet TAKE ONE TABLET BY MOUTH THREE TIMES A DAY 10/26/17   Steven Roberts MD   captopril (CAPOTEN) 25 MG tablet TAKE ONE TABLET BY MOUTH THREE TIMES A DAY 10/17/17   [facility]   lansoprazole (PREVACID) 30 MG delayed release capsule Take 1 capsule by mouth daily 9/22/17 [facility]   atorvastatin (LIPITOR) 40 MG tablet TAKE ONE TABLET BY MOUTH DAILY 8/29/17 [facility]   atenolol (TENORMIN) 25 MG tablet TAKE ONE TABLET BY MOUTH DAILY 8/29/17 [facility]   allopurinol (ZYLOPRIM) 100 MG tablet TAKE ONE TABLET BY MOUTH DAILY 8/28/17 [facility]   Blood Glucose Monitoring Suppl ROMY Glucometer- Free Style Lite 6/12/17 [facility]   Lancet Device MISC 1 Device by [employer]. (Med.Supl.;Non-Drugs) route 3 times daily To check blood sugars. Free Style Lite 6/12/17 [facility], DO   Lancets MISC Free Style Lite, Use 3 daily.  Insulin Dependent 6/12/17   Toro Vizcarra, DO   Glucose Blood (BLOOD GLUCOSE TEST STRIPS) STRP Free Style Lite, Use 3 daily. Insulin Dependent 6/12/17   Toro Vizcarra, DO   furosemide (LASIX) 80 MG tablet TAKE ONE TABLET BY MOUTH DAILY 6/9/17   Toro Vizcarra, DO   DULERA 100-5 MCG/ACT inhaler INHALE TWO PUFFS BY MOUTH TWICE A DAY 4/25/17   Toro Vizcarra, DO   isosorbide mononitrate (IMDUR) 60 MG extended release tablet TAKE ONE TABLET BY MOUTH DAILY 3/2/17   Toro Vizcarra, DO   levofloxacin (LEVAQUIN) 500 MG tablet  11/29/16   Historical Provider, MD   metFORMIN (GLUCOPHAGE XR) 500 MG extended release tablet Take 1 tablet by mouth 2 times daily 2/1/17   Toro Vizcarra, DO   potassium chloride (KLOR-CON M20) 20 MEQ extended release tablet Take 1 tablet by mouth daily 2/1/17   Toro Vizcarra, DO   ranolazine (RANEXA) 500 MG extended release tablet Take 1 tablet by mouth 2 times daily 2/1/17   Toro Vizcarra, DO   Magnesium Oxide 500 MG CAPS Take 500 mg by mouth daily 2/1/17   Toro Vizcarra, DO   isosorbide mononitrate (IMDUR) 60 MG extended release tablet TAKE ONE TABLET BY MOUTH DAILY 2/1/17   Toro Vizcarra, DO   benzonatate (TESSALON PERLES) 100 MG capsule Take 1 capsule by mouth 3 times daily as needed for Cough 11/29/16   Shant Barrios MD   budesonide-formoterol (SYMBICORT) 160-4.5 MCG/ACT AERO Inhale 2 puffs into the lungs 2 times daily 4/19/16   Toro Vizcarra, DO   fluticasone-salmeterol (ADVAIR DISKUS) 250-50 MCG/DOSE AEPB Inhale 1 puff into the lungs every 12 hours 4/14/16   Toro Vizcarra, DO   morphine sulfate ER (MS CONTIN) 30 MG CR tablet  3/23/16   Historical Provider, MD   aspirin 325 MG tablet Take 325 mg by mouth daily.     Historical Provider, MD       Future Appointments  Date Time Provider Diandra Horton   8/20/2018 8:20 AM Toro Vizcarra DO [facility] [address] [place] [address]

## 2017-12-04 RX ORDER — RIFAXIMIN 550 MG/1
TABLET ORAL
Qty: 28 TABLET | Refills: 0 | Status: SHIPPED | OUTPATIENT
Start: 2017-12-04 | End: 2017-12-21 | Stop reason: SDUPTHER

## 2017-12-04 NOTE — TELEPHONE ENCOUNTER
Last OV 11/09/2017      Health Maintenance   Topic Date Due    Smoker: low dose lung CT screening  11/25/1999    Lipid screen  11/18/2016    DTaP/Tdap/Td vaccine (1 - Tdap) 02/14/2018 (Originally 11/25/1964)    Diabetic retinal exam  10/16/2018 (Originally 4/12/2017)    Diabetic foot exam  02/01/2018    Pneumococcal low/med risk (2 of 2 - PPSV23) 02/01/2018    Diabetic hemoglobin A1C test  10/16/2018    Colon cancer screen colonoscopy  05/13/2021    Zostavax vaccine  Addressed    Flu vaccine  Completed             (applicable per patient's age: Cancer Screenings, Depression Screening, Fall Risk Screening, Immunizations)    Hemoglobin A1C (%)   Date Value   10/16/2017 6.2   06/06/2017 12.2   02/01/2017 7.9     LDL Calculated (mg/dL)   Date Value   11/18/2015 74      (goal A1C is < 7)   (goal LDL is <100) need 30-50% reduction from baseline     BP Readings from Last 3 Encounters:   11/09/17 98/62   10/16/17 102/62   09/25/17 101/65    (goal /80)      All Future Testing planned in [facility]:  Lab Frequency Next Occurrence   Comprehensive Metabolic Panel Once 06/96/7931   Lipid Panel Once 02/01/2018   Lipid, Fasting Once 11/15/2017   Comprehensive Metabolic Panel Once 84/81/2678   CBC Auto Differential Once 10/16/2017   Lipid Panel Once 10/16/2017   TSH without Reflex Once 10/16/2017       Next Visit Date:  Future Appointments  Date Time Provider Diandra Horton   8/20/2018 8:20 AM Sima Britton DO [facility] [address] [facility]            Patient Active Problem List:     AAA (abdominal aortic aneurysm) (HCC)     Hypertension     Hyperlipidemia     OA (osteoarthritis) of hip     Colon polyps     Gout     GERD (gastroesophageal reflux disease)     Oropharyngeal dysphagia     Post laminectomy syndrome     Tobacco use     Ventral hernia     Obese     Chronic back pain sees Dr Daphne Rodas     DDD (degenerative disc disease), lumbar     Diabetic nephropathy with proteinuria ([facility] [address]     Type 2 diabetes with nephropathy ([address]     Mitral regurgitation     Hx of CABG     Macrocytosis without anemia     Coronary artery disease involving native coronary artery of native heart without angina pectoris     Ear lesion     Chondrodermatitis nodularis chronica helicis     Actinic keratosis     Neoplasm of uncertain behavior of skin     CHF (congestive heart failure) (HCC)     CAD (coronary artery disease)     Obesity (BMI 30-39. 9)     History of congestive heart failure     Central sleep apnea

## 2017-12-06 DIAGNOSIS — I25.10 CORONARY ARTERY DISEASE INVOLVING NATIVE HEART, ANGINA PRESENCE UNSPECIFIED, UNSPECIFIED VESSEL OR LESION TYPE: ICD-10-CM

## 2017-12-06 RX ORDER — FUROSEMIDE 80 MG
TABLET ORAL
Qty: 90 TABLET | Refills: 0 | Status: SHIPPED | OUTPATIENT
Start: 2017-12-06 | End: 2018-02-21 | Stop reason: SDUPTHER

## 2017-12-06 NOTE — TELEPHONE ENCOUNTER
Health Maintenance   Topic Date Due    Smoker: low dose lung CT screening  11/25/1999    Lipid screen  11/18/2016    DTaP/Tdap/Td vaccine (1 - Tdap) 02/14/2018 (Originally 11/25/1964)    Diabetic retinal exam  10/16/2018 (Originally 4/12/2017)    Diabetic foot exam  02/01/2018    Pneumococcal low/med risk (2 of 2 - PPSV23) 02/01/2018    Diabetic hemoglobin A1C test  10/16/2018    Colon cancer screen colonoscopy  05/13/2021    Zostavax vaccine  Addressed    Flu vaccine  Completed             (applicable per patient's age: Cancer Screenings, Depression Screening, Fall Risk Screening, Immunizations)    Hemoglobin A1C (%)   Date Value   10/16/2017 6.2   06/06/2017 12.2   02/01/2017 7.9     LDL Calculated (mg/dL)   Date Value   11/18/2015 74      (goal A1C is < 7)   (goal LDL is <100) need 30-50% reduction from baseline     BP Readings from Last 3 Encounters:   11/09/17 98/62   10/16/17 102/62   09/25/17 101/65    (goal /80)      All Future Testing planned in [facility]:  Lab Frequency Next Occurrence   Comprehensive Metabolic Panel Once 34/78/4197   Lipid Panel Once 02/01/2018   Lipid, Fasting Once 11/15/2017   Comprehensive Metabolic Panel Once 84/72/5612   CBC Auto Differential Once 10/16/2017   Lipid Panel Once 10/16/2017   TSH without Reflex Once 10/16/2017       Next Visit Date:  Future Appointments  Date Time Provider Diandra Horton   8/20/2018 8:20 AM DO Maryana Carranza            Patient Active Problem List:     AAA (abdominal aortic aneurysm) ([address]     Hypertension     Hyperlipidemia     OA (osteoarthritis) of hip     Colon polyps     Gout     GERD (gastroesophageal reflux disease)     Oropharyngeal dysphagia     Post laminectomy syndrome     Tobacco use     Ventral hernia     Obese     Chronic back pain sees Dr Huyen Parekh     DDD (degenerative disc disease), lumbar     Diabetic nephropathy with proteinuria ([address]     Type 2 diabetes with nephropathy ([address]     Mitral

## 2017-12-11 ENCOUNTER — CARE COORDINATION (OUTPATIENT)
Dept: CARE COORDINATION | Age: 72
End: 2017-12-11

## 2017-12-18 ENCOUNTER — TELEPHONE (OUTPATIENT)
Dept: PRIMARY CARE CLINIC | Age: 72
End: 2017-12-18

## 2017-12-21 RX ORDER — RIFAXIMIN 550 MG/1
TABLET ORAL
Qty: 28 TABLET | Refills: 0 | Status: SHIPPED | OUTPATIENT
Start: 2017-12-21 | End: 2018-01-30 | Stop reason: ALTCHOICE

## 2017-12-21 NOTE — TELEPHONE ENCOUNTER
Health Maintenance   Topic Date Due    Smoker: low dose lung CT screening  11/25/1999    Lipid screen  11/18/2016    DTaP/Tdap/Td vaccine (1 - Tdap) 02/14/2018 (Originally 11/25/1964)    Diabetic retinal exam  10/16/2018 (Originally 4/12/2017)    Diabetic foot exam  02/01/2018    Pneumococcal low/med risk (2 of 2 - PPSV23) 02/01/2018    Diabetic hemoglobin A1C test  10/16/2018    Colon cancer screen colonoscopy  05/13/2021    Zostavax vaccine  Addressed    Flu vaccine  Completed             (applicable per patient's age: Cancer Screenings, Depression Screening, Fall Risk Screening, Immunizations)    Hemoglobin A1C (%)   Date Value   10/16/2017 6.2   06/06/2017 12.2   02/01/2017 7.9     LDL Calculated (mg/dL)   Date Value   11/18/2015 74      (goal A1C is < 7)   (goal LDL is <100) need 30-50% reduction from baseline     BP Readings from Last 3 Encounters:   11/09/17 98/62   10/16/17 102/62   09/25/17 101/65    (goal /80)      All Future Testing planned in [facility]:  Lab Frequency Next Occurrence   Comprehensive Metabolic Panel Once 95/60/7288   Lipid Panel Once 02/01/2018   Lipid, Fasting Once 11/15/2017   Comprehensive Metabolic Panel Once 82/78/8881   CBC Auto Differential Once 10/16/2017   Lipid Panel Once 10/16/2017   TSH without Reflex Once 10/16/2017       Next Visit Date:  Future Appointments  Date Time Provider Diandra Horton   8/20/2018 8:20 AM DO Maryana Pascal            Patient Active Problem List:     AAA (abdominal aortic aneurysm) ([address]     Hypertension     Hyperlipidemia     OA (osteoarthritis) of hip     Colon polyps     Gout     GERD (gastroesophageal reflux disease)     Oropharyngeal dysphagia     Post laminectomy syndrome     Tobacco use     Ventral hernia     Obese     Chronic back pain sees Dr Francisco Villalba     DDD (degenerative disc disease), lumbar     Diabetic nephropathy with proteinuria ([address]     Type 2 diabetes with nephropathy ([address]     Mitral regurgitation     Hx of CABG     Macrocytosis without anemia     Coronary artery disease involving native coronary artery of native heart without angina pectoris     Ear lesion     Chondrodermatitis nodularis chronica helicis     Actinic keratosis     Neoplasm of uncertain behavior of skin     CHF (congestive heart failure) (HCC)     CAD (coronary artery disease)     Obesity (BMI 30-39. 9)     History of congestive heart failure     Central sleep apnea

## 2018-01-05 ENCOUNTER — HOSPITAL ENCOUNTER (OUTPATIENT)
Age: 73
Discharge: HOME | End: 2018-01-05
Payer: MEDICARE

## 2018-01-05 ENCOUNTER — OFFICE VISIT (OUTPATIENT)
Dept: PRIMARY CARE CLINIC | Age: 73
End: 2018-01-05
Payer: MEDICARE

## 2018-01-05 ENCOUNTER — HOSPITAL ENCOUNTER (OUTPATIENT)
Dept: CT IMAGING | Age: 73
Discharge: HOME | End: 2018-01-05
Payer: MEDICARE

## 2018-01-05 ENCOUNTER — TELEPHONE (OUTPATIENT)
Dept: PRIMARY CARE CLINIC | Age: 73
End: 2018-01-05

## 2018-01-05 ENCOUNTER — HOSPITAL ENCOUNTER (OUTPATIENT)
Age: 73
Setting detail: SPECIMEN
Discharge: HOME | End: 2018-01-05
Payer: MEDICARE

## 2018-01-05 ENCOUNTER — HOSPITAL ENCOUNTER (OUTPATIENT)
Dept: ULTRASOUND IMAGING | Age: 73
Discharge: HOME | End: 2018-01-05
Payer: MEDICARE

## 2018-01-05 VITALS
HEART RATE: 60 BPM | BODY MASS INDEX: 31.86 KG/M2 | WEIGHT: 227.6 LBS | SYSTOLIC BLOOD PRESSURE: 102 MMHG | DIASTOLIC BLOOD PRESSURE: 64 MMHG | RESPIRATION RATE: 17 BRPM | OXYGEN SATURATION: 97 % | HEIGHT: 71 IN

## 2018-01-05 DIAGNOSIS — R31.9 HEMATURIA, UNSPECIFIED TYPE: ICD-10-CM

## 2018-01-05 DIAGNOSIS — Z12.5 ENCOUNTER FOR SCREENING FOR MALIGNANT NEOPLASM OF PROSTATE: ICD-10-CM

## 2018-01-05 DIAGNOSIS — R31.0 GROSS HEMATURIA: ICD-10-CM

## 2018-01-05 DIAGNOSIS — R93.5 ABNORMAL CT OF THE ABDOMEN: ICD-10-CM

## 2018-01-05 DIAGNOSIS — R31.0 GROSS HEMATURIA: Primary | ICD-10-CM

## 2018-01-05 DIAGNOSIS — R31.9 HEMATURIA, UNSPECIFIED TYPE: Primary | ICD-10-CM

## 2018-01-05 LAB
BILIRUBIN, POC: NORMAL
BLOOD URINE, POC: NORMAL
CLARITY, POC: NORMAL
COLOR, POC: NORMAL
GLUCOSE URINE, POC: NORMAL
HCT VFR BLD CALC: 38.7 % (ref 40.7–50.3)
HEMOGLOBIN: 12.7 G/DL (ref 13–17)
KETONES, POC: NORMAL
LEUKOCYTE EST, POC: NORMAL
MCH RBC QN AUTO: 33.9 PG (ref 25.2–33.5)
MCHC RBC AUTO-ENTMCNC: 32.8 G/DL (ref 28.4–34.8)
MCV RBC AUTO: 103.2 FL (ref 82.6–102.9)
NITRITE, POC: NORMAL
PDW BLD-RTO: 14.7 % (ref 11.8–14.4)
PH, POC: 5
PLATELET # BLD: 206 K/UL (ref 138–453)
PMV BLD AUTO: 10.6 FL (ref 8.1–13.5)
PROSTATE SPECIFIC ANTIGEN: 0.97 UG/L
PROTEIN, POC: NORMAL
RBC # BLD: 3.75 M/UL (ref 4.21–5.77)
SPECIFIC GRAVITY, POC: NORMAL
UROBILINOGEN, POC: NORMAL
WBC # BLD: 8.4 K/UL (ref 3.5–11.3)

## 2018-01-05 PROCEDURE — 1123F ACP DISCUSS/DSCN MKR DOCD: CPT | Performed by: NURSE PRACTITIONER

## 2018-01-05 PROCEDURE — 3017F COLORECTAL CA SCREEN DOC REV: CPT | Performed by: NURSE PRACTITIONER

## 2018-01-05 PROCEDURE — 99214 OFFICE O/P EST MOD 30 MIN: CPT | Performed by: NURSE PRACTITIONER

## 2018-01-05 PROCEDURE — G8482 FLU IMMUNIZE ORDER/ADMIN: HCPCS | Performed by: NURSE PRACTITIONER

## 2018-01-05 PROCEDURE — 36415 COLL VENOUS BLD VENIPUNCTURE: CPT

## 2018-01-05 PROCEDURE — 85027 COMPLETE CBC AUTOMATED: CPT

## 2018-01-05 PROCEDURE — G8427 DOCREV CUR MEDS BY ELIG CLIN: HCPCS | Performed by: NURSE PRACTITIONER

## 2018-01-05 PROCEDURE — G8417 CALC BMI ABV UP PARAM F/U: HCPCS | Performed by: NURSE PRACTITIONER

## 2018-01-05 PROCEDURE — G8598 ASA/ANTIPLAT THER USED: HCPCS | Performed by: NURSE PRACTITIONER

## 2018-01-05 PROCEDURE — G0103 PSA SCREENING: HCPCS

## 2018-01-05 PROCEDURE — 74176 CT ABD & PELVIS W/O CONTRAST: CPT

## 2018-01-05 PROCEDURE — 81003 URINALYSIS AUTO W/O SCOPE: CPT | Performed by: NURSE PRACTITIONER

## 2018-01-05 PROCEDURE — 76857 US EXAM PELVIC LIMITED: CPT

## 2018-01-05 PROCEDURE — 4004F PT TOBACCO SCREEN RCVD TLK: CPT | Performed by: NURSE PRACTITIONER

## 2018-01-05 PROCEDURE — 4040F PNEUMOC VAC/ADMIN/RCVD: CPT | Performed by: NURSE PRACTITIONER

## 2018-01-05 ASSESSMENT — LIFESTYLE VARIABLES: TOBACCO_USE: 1

## 2018-01-05 ASSESSMENT — ENCOUNTER SYMPTOMS
VOMITING: 0
FACIAL SWELLING: 0
NAUSEA: 0
ABDOMINAL PAIN: 0

## 2018-01-05 NOTE — PROGRESS NOTES
disease) 7/10/2014    Gout 2/7/2014    Heart attack     Hx of CABG 12/9/2015    Hyperlipidemia     Hypertension     Mitral regurgitation 12/9/2015    Obese 2/19/2015    Oropharyngeal dysphagia 7/10/2014    Post laminectomy syndrome 9/24/2014    Tobacco use 11/25/2014    Type 2 diabetes with nephropathy ([facility] [address] 11/20/2015      Past Surgical History:   Procedure Laterality Date    ABDOMINAL AORTIC ANEURYSM REPAIR  12/02    ARTERIAL BYPASS SURGRY  1984    Double    BACK SURGERY  2000, 2003    lumbar    COLONOSCOPY      CORONARY ARTERY BYPASS GRAFT      HERNIA REPAIR  12/1980    Hiatal    HERNIA REPAIR Left 10/1997    Ventral    KNEE SURGERY Left     KNEE SURGERY Right 2006    KNEE SURGERY Left 2008    MALIGNANT SKIN LESION EXCISION Right 04/27/2017    Dr. Salvador Singh Left 2012    TUMOR REMOVAL Right     neck    WRIST GANGLION EXCISION Left 2008       Family History   Problem Relation Age of Onset    Cancer Mother      breast    High Blood Pressure Father     Heart Disease Father     Stroke Father     Heart Disease Brother     Asthma Maternal Grandmother     Asthma Paternal Grandmother        Social History   Substance Use Topics    Smoking status: Current Every Day Smoker     Packs/day: 1.00     Years: 40.00     Types: Cigarettes    Smokeless tobacco: Never Used    Alcohol use No      Current Outpatient Prescriptions   Medication Sig Dispense Refill    XIFAXAN 550 MG tablet TAKE ONE TABLET BY MOUTH TWICE A DAY FOR 14 DAYS 28 tablet 0    furosemide (LASIX) 80 MG tablet TAKE ONE TABLET BY MOUTH DAILY 90 tablet 0    isosorbide mononitrate (IMDUR) 60 MG extended release tablet TAKE ONE TABLET BY MOUTH DAILY 90 tablet 1    metFORMIN (GLUCOPHAGE) 500 MG tablet TAKE ONE TABLET BY MOUTH THREE TIMES A DAY 90 tablet 2    captopril (CAPOTEN) 25 MG tablet TAKE ONE TABLET BY MOUTH THREE TIMES A  tablet 1    lansoprazole (PREVACID) 30 MG delayed release capsule Take 1 capsule

## 2018-01-06 LAB
CULTURE: NO GROWTH
CULTURE: NORMAL
Lab: NORMAL
SPECIMEN DESCRIPTION: NORMAL
STATUS: NORMAL

## 2018-01-12 ENCOUNTER — OFFICE VISIT (OUTPATIENT)
Dept: UROLOGY | Age: 73
End: 2018-01-12
Payer: MEDICARE

## 2018-01-12 ENCOUNTER — CARE COORDINATION (OUTPATIENT)
Dept: CARE COORDINATION | Age: 73
End: 2018-01-12

## 2018-01-12 VITALS
HEART RATE: 65 BPM | BODY MASS INDEX: 32.2 KG/M2 | HEIGHT: 71 IN | DIASTOLIC BLOOD PRESSURE: 63 MMHG | TEMPERATURE: 98.2 F | SYSTOLIC BLOOD PRESSURE: 119 MMHG | WEIGHT: 230 LBS

## 2018-01-12 DIAGNOSIS — R31.0 GROSS HEMATURIA: Primary | ICD-10-CM

## 2018-01-12 DIAGNOSIS — N32.89 BLADDER MASS: ICD-10-CM

## 2018-01-12 LAB
BILIRUBIN, POC: NORMAL
BLOOD URINE, POC: NORMAL
CLARITY, POC: CLEAR
COLOR, POC: NORMAL
GLUCOSE URINE, POC: NORMAL
KETONES, POC: NORMAL
LEUKOCYTE EST, POC: NORMAL
NITRITE, POC: NORMAL
PH, POC: NORMAL
PROTEIN, POC: NORMAL
SPECIFIC GRAVITY, POC: NORMAL
UROBILINOGEN, POC: NORMAL

## 2018-01-12 PROCEDURE — 4040F PNEUMOC VAC/ADMIN/RCVD: CPT | Performed by: UROLOGY

## 2018-01-12 PROCEDURE — 1123F ACP DISCUSS/DSCN MKR DOCD: CPT | Performed by: UROLOGY

## 2018-01-12 PROCEDURE — 81002 URINALYSIS NONAUTO W/O SCOPE: CPT | Performed by: UROLOGY

## 2018-01-12 PROCEDURE — G8598 ASA/ANTIPLAT THER USED: HCPCS | Performed by: UROLOGY

## 2018-01-12 PROCEDURE — 3017F COLORECTAL CA SCREEN DOC REV: CPT | Performed by: UROLOGY

## 2018-01-12 PROCEDURE — 99204 OFFICE O/P NEW MOD 45 MIN: CPT | Performed by: UROLOGY

## 2018-01-12 PROCEDURE — G8417 CALC BMI ABV UP PARAM F/U: HCPCS | Performed by: UROLOGY

## 2018-01-12 PROCEDURE — G8482 FLU IMMUNIZE ORDER/ADMIN: HCPCS | Performed by: UROLOGY

## 2018-01-12 PROCEDURE — 4004F PT TOBACCO SCREEN RCVD TLK: CPT | Performed by: UROLOGY

## 2018-01-12 PROCEDURE — G8427 DOCREV CUR MEDS BY ELIG CLIN: HCPCS | Performed by: UROLOGY

## 2018-01-12 ASSESSMENT — ENCOUNTER SYMPTOMS
COUGH: 0
SHORTNESS OF BREATH: 1
BACK PAIN: 1
NAUSEA: 0
EYE REDNESS: 0
EYE PAIN: 0
COLOR CHANGE: 0
ABDOMINAL PAIN: 0
WHEEZING: 0
VOMITING: 0

## 2018-01-12 NOTE — PROGRESS NOTES
500 MG extended release tablet Take 1 tablet by mouth 2 times daily 180 tablet 3    potassium chloride (KLOR-CON M20) 20 MEQ extended release tablet Take 1 tablet by mouth daily 90 tablet 3    ranolazine (RANEXA) 500 MG extended release tablet Take 1 tablet by mouth 2 times daily 180 tablet 3    Magnesium Oxide 500 MG CAPS Take 500 mg by mouth daily 90 capsule 3    benzonatate (TESSALON PERLES) 100 MG capsule Take 1 capsule by mouth 3 times daily as needed for Cough 30 capsule 0    budesonide-formoterol (SYMBICORT) 160-4.5 MCG/ACT AERO Inhale 2 puffs into the lungs 2 times daily 1 Inhaler 3    fluticasone-salmeterol (ADVAIR DISKUS) 250-50 MCG/DOSE AEPB Inhale 1 puff into the lungs every 12 hours 60 each 3    morphine sulfate ER (MS CONTIN) 30 MG CR tablet       aspirin 325 MG tablet Take 325 mg by mouth daily. Baclofen; Iodides; and Methadone  History   Smoking Status    Current Every Day Smoker    Packs/day: 1.00    Years: 40.00    Types: Cigarettes   Smokeless Tobacco    Never Used      (If patient a smoker, smoking cessation counseling offered)   History   Alcohol Use No       REVIEW OF SYSTEMS:  Review of Systems   Constitutional: Negative for appetite change, chills and fever. Eyes: Negative for pain, redness and visual disturbance. Respiratory: Positive for shortness of breath. Negative for cough and wheezing. Cardiovascular: Negative for chest pain and leg swelling. Gastrointestinal: Negative for abdominal pain, nausea and vomiting. Genitourinary: Positive for dysuria. Negative for difficulty urinating, discharge, flank pain, frequency, hematuria, scrotal swelling, testicular pain and urgency. Musculoskeletal: Positive for back pain (chronic). Negative for joint swelling and myalgias. Skin: Negative for color change, rash and wound. Neurological: Negative for dizziness, tremors and numbness. Hematological: Negative for adenopathy. Bruises/bleeds easily.        Physical

## 2018-01-12 NOTE — CARE COORDINATION
Attempt to contact regarding care cooration, no answer unable to leave voicemail, just rings.     Azul Rivera RN

## 2018-01-15 RX ORDER — BACLOFEN 20 MG
500 TABLET ORAL DAILY
Qty: 90 TABLET | Refills: 2 | Status: SHIPPED | OUTPATIENT
Start: 2018-01-15 | End: 2018-10-12 | Stop reason: SDUPTHER

## 2018-01-17 ENCOUNTER — TELEPHONE (OUTPATIENT)
Dept: UROLOGY | Age: 73
End: 2018-01-17

## 2018-01-17 ENCOUNTER — CARE COORDINATION (OUTPATIENT)
Dept: CARE COORDINATION | Age: 73
End: 2018-01-17

## 2018-01-17 NOTE — CARE COORDINATION
Interventions:  instructed on insulin start         Goals Addressed             Most Recent     Medication Management   On track (1/17/2018)             I will take my medication as directed. I will notify my provider of any problems with medications, like adverse effects or side effects. I will notify my provider/Care Coordinator if I am unable to afford my medications. I will notify my provider for advice before I stop taking any of my medication. Barriers: none  Plan for overcoming my barriers: I will follow PCP recommendation to reduce my blood sugar, agreed to start insulin  Confidence: 7/10  Anticipated Goal Completion Date: 9-8-17, next A1C            Prior to Admission medications    Medication Sig Start Date End Date Taking?  Authorizing Provider   Magnesium Oxide 500 MG TABS Take 500 mg by mouth daily 1/15/18   [place] CNP   XIFAXAN 550 MG tablet TAKE ONE TABLET BY MOUTH TWICE A DAY FOR 14 DAYS 12/21/17   [place] CNP   furosemide (LASIX) 80 MG tablet TAKE ONE TABLET BY MOUTH DAILY 12/6/17   Ashley Quick, DO   isosorbide mononitrate (IMDUR) 60 MG extended release tablet TAKE ONE TABLET BY MOUTH DAILY 11/27/17   Ashley Quick, DO   metFORMIN (GLUCOPHAGE) 500 MG tablet TAKE ONE TABLET BY MOUTH THREE TIMES A DAY 10/26/17   Roshan Castanon MD   captopril (CAPOTEN) 25 MG tablet TAKE ONE TABLET BY MOUTH THREE TIMES A DAY 10/17/17   Ashley Quick, DO   lansoprazole (PREVACID) 30 MG delayed release capsule Take 1 capsule by mouth daily 9/22/17   Ashley Quick, DO   atorvastatin (LIPITOR) 40 MG tablet TAKE ONE TABLET BY MOUTH DAILY 8/29/17   Ashley Quick, DO   atenolol (TENORMIN) 25 MG tablet TAKE ONE TABLET BY MOUTH DAILY 8/29/17   Ashley Quick, DO   allopurinol (ZYLOPRIM) 100 MG tablet TAKE ONE TABLET BY MOUTH DAILY 8/28/17   Ashley Quick, DO   Blood Glucose Monitoring Suppl ROMY Glucometer- Free Style Lite 6/12/17   Ashley Quick, DO   Lancet

## 2018-01-17 NOTE — TELEPHONE ENCOUNTER
Cysto, Retrograde Pyelogram, TURBT @ [facility] 2/5/18 12:00pm  **STOP BLOOD THINNERS 1/29/18**  PAT @ [facility] 1/30/18 1:30pm               Spoke with wife, procedure info mailed 1/17/18.

## 2018-01-30 ENCOUNTER — HOSPITAL ENCOUNTER (OUTPATIENT)
Dept: PREADMISSION TESTING | Age: 73
Discharge: HOME | End: 2018-02-03
Payer: MEDICARE

## 2018-01-30 VITALS
BODY MASS INDEX: 31.92 KG/M2 | TEMPERATURE: 97.7 F | DIASTOLIC BLOOD PRESSURE: 68 MMHG | RESPIRATION RATE: 16 BRPM | OXYGEN SATURATION: 96 % | WEIGHT: 228 LBS | HEART RATE: 56 BPM | HEIGHT: 71 IN | SYSTOLIC BLOOD PRESSURE: 123 MMHG

## 2018-01-30 LAB
ANION GAP SERPL CALCULATED.3IONS-SCNC: 13 MMOL/L (ref 9–17)
BUN BLDV-MCNC: 23 MG/DL (ref 8–23)
CHLORIDE BLD-SCNC: 100 MMOL/L (ref 98–107)
CO2: 25 MMOL/L (ref 20–31)
CREAT SERPL-MCNC: 1.18 MG/DL (ref 0.7–1.2)
EKG ATRIAL RATE: 50 BPM
EKG P AXIS: 58 DEGREES
EKG P-R INTERVAL: 152 MS
EKG Q-T INTERVAL: 456 MS
EKG QRS DURATION: 96 MS
EKG QTC CALCULATION (BAZETT): 415 MS
EKG R AXIS: -4 DEGREES
EKG T AXIS: 34 DEGREES
EKG VENTRICULAR RATE: 50 BPM
GFR AFRICAN AMERICAN: >60 ML/MIN
GFR NON-AFRICAN AMERICAN: >60 ML/MIN
GFR SERPL CREATININE-BSD FRML MDRD: NORMAL ML/MIN/{1.73_M2}
GFR SERPL CREATININE-BSD FRML MDRD: NORMAL ML/MIN/{1.73_M2}
GLUCOSE BLD-MCNC: 94 MG/DL (ref 70–99)
HCT VFR BLD CALC: 38.5 % (ref 40.7–50.3)
HEMOGLOBIN: 12.6 G/DL (ref 13–17)
POTASSIUM SERPL-SCNC: 4.5 MMOL/L (ref 3.7–5.3)
SODIUM BLD-SCNC: 138 MMOL/L (ref 135–144)

## 2018-01-30 PROCEDURE — 80051 ELECTROLYTE PANEL: CPT

## 2018-01-30 PROCEDURE — 82947 ASSAY GLUCOSE BLOOD QUANT: CPT

## 2018-01-30 PROCEDURE — 87086 URINE CULTURE/COLONY COUNT: CPT

## 2018-01-30 PROCEDURE — 85018 HEMOGLOBIN: CPT

## 2018-01-30 PROCEDURE — 93005 ELECTROCARDIOGRAM TRACING: CPT

## 2018-01-30 PROCEDURE — 85014 HEMATOCRIT: CPT

## 2018-01-30 PROCEDURE — 82565 ASSAY OF CREATININE: CPT

## 2018-01-30 PROCEDURE — 84520 ASSAY OF UREA NITROGEN: CPT

## 2018-01-30 PROCEDURE — 36415 COLL VENOUS BLD VENIPUNCTURE: CPT

## 2018-01-30 RX ORDER — SODIUM CHLORIDE, SODIUM LACTATE, POTASSIUM CHLORIDE, CALCIUM CHLORIDE 600; 310; 30; 20 MG/100ML; MG/100ML; MG/100ML; MG/100ML
1000 INJECTION, SOLUTION INTRAVENOUS CONTINUOUS
Status: CANCELLED | OUTPATIENT
Start: 2018-01-30

## 2018-01-30 NOTE — H&P
mononitrate (IMDUR) 60 MG extended release tablet TAKE ONE TABLET BY MOUTH DAILY 90 tablet 1    captopril (CAPOTEN) 25 MG tablet TAKE ONE TABLET BY MOUTH THREE TIMES A  tablet 1    lansoprazole (PREVACID) 30 MG delayed release capsule Take 1 capsule by mouth daily 90 capsule 1    atorvastatin (LIPITOR) 40 MG tablet TAKE ONE TABLET BY MOUTH DAILY 90 tablet 1    atenolol (TENORMIN) 25 MG tablet TAKE ONE TABLET BY MOUTH DAILY 90 tablet 1    allopurinol (ZYLOPRIM) 100 MG tablet TAKE ONE TABLET BY MOUTH DAILY 90 tablet 1    Blood Glucose Monitoring Suppl ROMY Glucometer- Free Style Lite 1 Device 0    Lancet Device MISC 1 Device by Reflect [employer]. (Med.Supl.;Non-Drugs) route 3 times daily To check blood sugars. Free Style Lite 1 Device 0    Lancets MISC Free Style Lite, Use 3 daily. Insulin Dependent 300 each 3    Glucose Blood (BLOOD GLUCOSE TEST STRIPS) STRP Free Style Lite, Use 3 daily. Insulin Dependent 300 strip 3    DULERA 100-5 MCG/ACT inhaler INHALE TWO PUFFS BY MOUTH TWICE A DAY 4 Inhaler 3    levofloxacin (LEVAQUIN) 500 MG tablet       potassium chloride (KLOR-CON M20) 20 MEQ extended release tablet Take 1 tablet by mouth daily 90 tablet 3    ranolazine (RANEXA) 500 MG extended release tablet Take 1 tablet by mouth 2 times daily 180 tablet 3    Magnesium Oxide 500 MG CAPS Take 500 mg by mouth daily 90 capsule 3    benzonatate (TESSALON PERLES) 100 MG capsule Take 1 capsule by mouth 3 times daily as needed for Cough 30 capsule 0    budesonide-formoterol (SYMBICORT) 160-4.5 MCG/ACT AERO Inhale 2 puffs into the lungs 2 times daily 1 Inhaler 3    fluticasone-salmeterol (ADVAIR DISKUS) 250-50 MCG/DOSE AEPB Inhale 1 puff into the lungs every 12 hours 60 each 3    morphine sulfate ER (MS CONTIN) 30 MG CR tablet       aspirin 325 MG tablet Take 325 mg by mouth daily. Continuous Infusions:  PRN Meds:. Allergies  is allergic to baclofen; iodides; and methadone.     Family History    family history grossly intact. EXTREMITIES: Pitting edema:  No,  Varicose veins: No     Dorsal pedal/posterior tibial pulses palpable: Yes         Strength:  Normal       Patient Active Problem List   Diagnosis    AAA (abdominal aortic aneurysm) (HCC)    Hypertension    Hyperlipidemia    OA (osteoarthritis) of hip    Colon polyps    Gout    GERD (gastroesophageal reflux disease)    Oropharyngeal dysphagia    Post laminectomy syndrome    Tobacco use    Ventral hernia    Obese    Chronic back pain sees Dr Morales Ocasio DDD (degenerative disc disease), lumbar    Diabetic nephropathy with proteinuria ([address]    Type 2 diabetes with nephropathy ([address]    Mitral regurgitation    Hx of CABG    Macrocytosis without anemia    Coronary artery disease involving native coronary artery of native heart without angina pectoris    Ear lesion    Chondrodermatitis nodularis chronica helicis    Actinic keratosis    Neoplasm of uncertain behavior of skin    CHF (congestive heart failure) (HCC)    CAD (coronary artery disease)    Obesity (BMI 30-39. 9)    History of congestive heart failure    Central sleep apnea               IMPRESSIONS:   1.  bladder tumor   2.  does not have any pertinent problems on file.     [facility]  Electronically signed 1/30/2018 at 1:40 PM       Scheduled for:    Cysto, bilateral retrograde pyelogram , TURB-Twith gryus

## 2018-01-31 LAB
CULTURE: NO GROWTH
CULTURE: NORMAL
Lab: NORMAL
SPECIMEN DESCRIPTION: NORMAL
STATUS: NORMAL

## 2018-02-01 ENCOUNTER — CARE COORDINATION (OUTPATIENT)
Dept: CARE COORDINATION | Age: 73
End: 2018-02-01

## 2018-02-01 NOTE — CARE COORDINATION
Ambulatory Care Coordination Note  2/1/2018  CM Risk Score: 3  [place] Mortality Risk Score: 12.17    ACC: TIAGO BHAKTA, RN     Plan of Care  1. Chronic Disease Management Education, Diabetes, CHF  2. Follow up after Bladder Cancer surgery  3. Identify needs/ barriers to health care and address    Summary Note:  Spoke with wife, Shannon Sabillon just laid down, states he is nervous about upcoming surgery 2/5/18, otherwise ok. Pre-op testing done, he is eating and drinking ok, sugars are stable had a little high one of 150 the other day but coming back down. Discussed current health status in attempt to identify any flu like symptoms with patient, pt denies, reviewed possible symptoms and plan of care with chronic disease processes. Pt verbalized understanding. Reviewed [facility] locations and extended hours as well. Asked for prayers for him with this surgery, writer verbalized understanding. Encouraged her to call us with questions, needs problems. Diabetes Assessment    Medic Alert ID:  No  Meal Planning:  Plate Method, Avoidance of concentrated sweets, Carb counting   How often do you test your blood sugar?:  Daily, Meals, Bedtime   Do you have barriers with adherence to non-pharmacologic self-management interventions?  (Nutrition/Exercise/Self-Monitoring):  No   Have you ever had to go to the ED for symptoms of low blood sugar?:  No       Do you have hyperglycemia symptoms?:  No   Do you have hypoglycemia symptoms?:  No   Last Blood Sugar Value:  150      ,   Congestive Heart Failure Assessment    Are you currently restricting fluids?:  No Restriction  Do you understand a low sodium diet?:  Yes  Do you understand how to read food labels?:  Yes  How many restaurant meals do you eat per week?:  1-2  Do you salt your food before tasting it?:  No         Symptoms:   None:  Yes          and   General Assessment    Do you have any symptoms that are causing concern?:  No               Care ONE TABLET BY MOUTH Daily Bed time 8/29/17   Anabell Day DO   atenolol (TENORMIN) 25 MG tablet TAKE ONE TABLET BY MOUTH DAILY 8/29/17   Anabell Day, DO   allopurinol (ZYLOPRIM) 100 MG tablet TAKE ONE TABLET BY MOUTH DAILY  Patient taking differently: TAKE ONE TABLET BY MOUTH Bed time 8/28/17   Anabell Day, DO   Blood Glucose Monitoring Suppl ROMY Glucometer- Free Style Lite 6/12/17   Anabell Day, DO   Lancet Device MISC 1 Device by Miscell. (Med.Supl.;Non-Drugs) route 3 times daily To check blood sugars. Free Style Lite 6/12/17   Anabell Day, DO   Lancets MISC Free Style Lite, Use 3 daily. Insulin Dependent 6/12/17   Anabell Day DO   Glucose Blood (BLOOD GLUCOSE TEST STRIPS) STRP Free Style Lite, Use 3 daily. Insulin Dependent 6/12/17   Anabell Day DO   DULERA 100-5 MCG/ACT inhaler INHALE TWO PUFFS BY MOUTH TWICE A DAY 4/25/17   Anabell Day DO   potassium chloride (KLOR-CON M20) 20 MEQ extended release tablet Take 1 tablet by mouth daily 2/1/17   Anabell Day, DO   ranolazine (RANEXA) 500 MG extended release tablet Take 1 tablet by mouth 2 times daily 2/1/17   Anabell Day, DO   morphine sulfate ER (MS CONTIN) 30 MG CR tablet Take 30 mg by mouth 3 times daily . 3/23/16   Historical Provider, MD   aspirin 325 MG tablet Take 325 mg by mouth daily.     Historical Provider, MD       Future Appointments  Date Time Provider Diandra Horton   2/13/2018 11:30 AM Tommy Ellis MD [address] [place] [address]   8/20/2018 8:20 AM DO Maryana Mckeon

## 2018-02-02 ENCOUNTER — ANESTHESIA EVENT (OUTPATIENT)
Dept: OPERATING ROOM | Age: 73
End: 2018-02-02
Payer: MEDICARE

## 2018-02-05 ENCOUNTER — APPOINTMENT (OUTPATIENT)
Dept: GENERAL RADIOLOGY | Age: 73
End: 2018-02-05
Attending: UROLOGY
Payer: MEDICARE

## 2018-02-05 ENCOUNTER — HOSPITAL ENCOUNTER (OUTPATIENT)
Age: 73
Setting detail: OUTPATIENT SURGERY
Discharge: HOME | End: 2018-02-05
Attending: UROLOGY | Admitting: UROLOGY
Payer: MEDICARE

## 2018-02-05 ENCOUNTER — ANESTHESIA (OUTPATIENT)
Dept: OPERATING ROOM | Age: 73
End: 2018-02-05
Payer: MEDICARE

## 2018-02-05 VITALS — DIASTOLIC BLOOD PRESSURE: 58 MMHG | SYSTOLIC BLOOD PRESSURE: 87 MMHG | OXYGEN SATURATION: 97 %

## 2018-02-05 VITALS
HEIGHT: 71 IN | BODY MASS INDEX: 31.3 KG/M2 | TEMPERATURE: 97.9 F | HEART RATE: 72 BPM | RESPIRATION RATE: 20 BRPM | WEIGHT: 223.55 LBS | DIASTOLIC BLOOD PRESSURE: 88 MMHG | OXYGEN SATURATION: 94 % | SYSTOLIC BLOOD PRESSURE: 124 MMHG

## 2018-02-05 LAB
GFR NON-AFRICAN AMERICAN: >60 ML/MIN
GFR SERPL CREATININE-BSD FRML MDRD: >60 ML/MIN
GFR SERPL CREATININE-BSD FRML MDRD: NORMAL ML/MIN/{1.73_M2}
GLUCOSE BLD-MCNC: 111 MG/DL (ref 74–100)
GLUCOSE BLD-MCNC: 129 MG/DL (ref 75–110)
POC CREATININE: 1.17 MG/DL (ref 0.51–1.19)
POC POTASSIUM: 4.2 MMOL/L (ref 3.5–4.5)

## 2018-02-05 PROCEDURE — 87086 URINE CULTURE/COLONY COUNT: CPT

## 2018-02-05 PROCEDURE — 74420 UROGRAPHY RTRGR +-KUB: CPT

## 2018-02-05 PROCEDURE — 2580000003 HC RX 258: Performed by: ANESTHESIOLOGY

## 2018-02-05 PROCEDURE — 2580000003 HC RX 258: Performed by: UROLOGY

## 2018-02-05 PROCEDURE — 82947 ASSAY GLUCOSE BLOOD QUANT: CPT

## 2018-02-05 PROCEDURE — 3600000014 HC SURGERY LEVEL 4 ADDTL 15MIN: Performed by: UROLOGY

## 2018-02-05 PROCEDURE — 84132 ASSAY OF SERUM POTASSIUM: CPT

## 2018-02-05 PROCEDURE — 3700000000 HC ANESTHESIA ATTENDED CARE: Performed by: UROLOGY

## 2018-02-05 PROCEDURE — 3700000001 HC ADD 15 MINUTES (ANESTHESIA): Performed by: UROLOGY

## 2018-02-05 PROCEDURE — 7100000000 HC PACU RECOVERY - FIRST 15 MIN: Performed by: UROLOGY

## 2018-02-05 PROCEDURE — 2500000003 HC RX 250 WO HCPCS: Performed by: NURSE ANESTHETIST, CERTIFIED REGISTERED

## 2018-02-05 PROCEDURE — 82565 ASSAY OF CREATININE: CPT

## 2018-02-05 PROCEDURE — 6360000002 HC RX W HCPCS: Performed by: NURSE ANESTHETIST, CERTIFIED REGISTERED

## 2018-02-05 PROCEDURE — 3600000004 HC SURGERY LEVEL 4 BASE: Performed by: UROLOGY

## 2018-02-05 PROCEDURE — 88120 CYTP URNE 3-5 PROBES EA SPEC: CPT

## 2018-02-05 PROCEDURE — 6360000002 HC RX W HCPCS: Performed by: UROLOGY

## 2018-02-05 PROCEDURE — 7100000011 HC PHASE II RECOVERY - ADDTL 15 MIN: Performed by: UROLOGY

## 2018-02-05 PROCEDURE — 2720000010 HC SURG SUPPLY STERILE: Performed by: UROLOGY

## 2018-02-05 PROCEDURE — 6360000004 HC RX CONTRAST MEDICATION: Performed by: UROLOGY

## 2018-02-05 PROCEDURE — 88307 TISSUE EXAM BY PATHOLOGIST: CPT

## 2018-02-05 PROCEDURE — 7100000001 HC PACU RECOVERY - ADDTL 15 MIN: Performed by: UROLOGY

## 2018-02-05 PROCEDURE — 7100000010 HC PHASE II RECOVERY - FIRST 15 MIN: Performed by: UROLOGY

## 2018-02-05 RX ORDER — HYDRALAZINE HYDROCHLORIDE 20 MG/ML
5 INJECTION INTRAMUSCULAR; INTRAVENOUS EVERY 10 MIN PRN
Status: DISCONTINUED | OUTPATIENT
Start: 2018-02-05 | End: 2018-02-05 | Stop reason: HOSPADM

## 2018-02-05 RX ORDER — SODIUM CHLORIDE 0.9 % (FLUSH) 0.9 %
10 SYRINGE (ML) INJECTION PRN
Status: DISCONTINUED | OUTPATIENT
Start: 2018-02-05 | End: 2018-02-05 | Stop reason: HOSPADM

## 2018-02-05 RX ORDER — SODIUM CHLORIDE 0.9 % (FLUSH) 0.9 %
10 SYRINGE (ML) INJECTION EVERY 12 HOURS SCHEDULED
Status: DISCONTINUED | OUTPATIENT
Start: 2018-02-05 | End: 2018-02-05 | Stop reason: HOSPADM

## 2018-02-05 RX ORDER — FENTANYL CITRATE 50 UG/ML
50 INJECTION, SOLUTION INTRAMUSCULAR; INTRAVENOUS EVERY 5 MIN PRN
Status: DISCONTINUED | OUTPATIENT
Start: 2018-02-05 | End: 2018-02-05 | Stop reason: HOSPADM

## 2018-02-05 RX ORDER — FENTANYL CITRATE 50 UG/ML
INJECTION, SOLUTION INTRAMUSCULAR; INTRAVENOUS PRN
Status: DISCONTINUED | OUTPATIENT
Start: 2018-02-05 | End: 2018-02-06 | Stop reason: SDUPTHER

## 2018-02-05 RX ORDER — MEPERIDINE HYDROCHLORIDE 50 MG/ML
12.5 INJECTION INTRAMUSCULAR; INTRAVENOUS; SUBCUTANEOUS EVERY 5 MIN PRN
Status: DISCONTINUED | OUTPATIENT
Start: 2018-02-05 | End: 2018-02-05 | Stop reason: HOSPADM

## 2018-02-05 RX ORDER — OXYCODONE HYDROCHLORIDE AND ACETAMINOPHEN 5; 325 MG/1; MG/1
1 TABLET ORAL PRN
Status: DISCONTINUED | OUTPATIENT
Start: 2018-02-05 | End: 2018-02-05 | Stop reason: HOSPADM

## 2018-02-05 RX ORDER — MAGNESIUM HYDROXIDE 1200 MG/15ML
LIQUID ORAL CONTINUOUS PRN
Status: DISCONTINUED | OUTPATIENT
Start: 2018-02-05 | End: 2018-02-05 | Stop reason: HOSPADM

## 2018-02-05 RX ORDER — MIDAZOLAM HYDROCHLORIDE 1 MG/ML
INJECTION INTRAMUSCULAR; INTRAVENOUS PRN
Status: DISCONTINUED | OUTPATIENT
Start: 2018-02-05 | End: 2018-02-06 | Stop reason: SDUPTHER

## 2018-02-05 RX ORDER — SODIUM CHLORIDE, SODIUM LACTATE, POTASSIUM CHLORIDE, CALCIUM CHLORIDE 600; 310; 30; 20 MG/100ML; MG/100ML; MG/100ML; MG/100ML
1000 INJECTION, SOLUTION INTRAVENOUS CONTINUOUS
Status: DISCONTINUED | OUTPATIENT
Start: 2018-02-05 | End: 2018-02-05 | Stop reason: HOSPADM

## 2018-02-05 RX ORDER — EPHEDRINE SULFATE 50 MG/ML
INJECTION, SOLUTION INTRAVENOUS PRN
Status: DISCONTINUED | OUTPATIENT
Start: 2018-02-05 | End: 2018-02-06 | Stop reason: SDUPTHER

## 2018-02-05 RX ORDER — LIDOCAINE HYDROCHLORIDE 10 MG/ML
1 INJECTION, SOLUTION EPIDURAL; INFILTRATION; INTRACAUDAL; PERINEURAL
Status: DISCONTINUED | OUTPATIENT
Start: 2018-02-05 | End: 2018-02-05 | Stop reason: HOSPADM

## 2018-02-05 RX ORDER — CIPROFLOXACIN 2 MG/ML
400 INJECTION, SOLUTION INTRAVENOUS
Status: COMPLETED | OUTPATIENT
Start: 2018-02-05 | End: 2018-02-05

## 2018-02-05 RX ORDER — OXYCODONE HYDROCHLORIDE AND ACETAMINOPHEN 5; 325 MG/1; MG/1
2 TABLET ORAL PRN
Status: DISCONTINUED | OUTPATIENT
Start: 2018-02-05 | End: 2018-02-05 | Stop reason: HOSPADM

## 2018-02-05 RX ORDER — MITOMYCIN 40 MG/80ML
40 INJECTION, POWDER, LYOPHILIZED, FOR SOLUTION INTRAVENOUS ONCE
Status: COMPLETED | OUTPATIENT
Start: 2018-02-05 | End: 2018-02-05

## 2018-02-05 RX ORDER — LABETALOL HYDROCHLORIDE 5 MG/ML
5 INJECTION, SOLUTION INTRAVENOUS EVERY 10 MIN PRN
Status: DISCONTINUED | OUTPATIENT
Start: 2018-02-05 | End: 2018-02-05 | Stop reason: HOSPADM

## 2018-02-05 RX ORDER — ONDANSETRON 2 MG/ML
4 INJECTION INTRAMUSCULAR; INTRAVENOUS
Status: DISCONTINUED | OUTPATIENT
Start: 2018-02-05 | End: 2018-02-05 | Stop reason: HOSPADM

## 2018-02-05 RX ORDER — DIPHENHYDRAMINE HYDROCHLORIDE 50 MG/ML
12.5 INJECTION INTRAMUSCULAR; INTRAVENOUS
Status: DISCONTINUED | OUTPATIENT
Start: 2018-02-05 | End: 2018-02-05 | Stop reason: HOSPADM

## 2018-02-05 RX ORDER — MORPHINE SULFATE 2 MG/ML
2 INJECTION, SOLUTION INTRAMUSCULAR; INTRAVENOUS EVERY 5 MIN PRN
Status: DISCONTINUED | OUTPATIENT
Start: 2018-02-05 | End: 2018-02-05 | Stop reason: HOSPADM

## 2018-02-05 RX ORDER — FENTANYL CITRATE 50 UG/ML
25 INJECTION, SOLUTION INTRAMUSCULAR; INTRAVENOUS EVERY 5 MIN PRN
Status: DISCONTINUED | OUTPATIENT
Start: 2018-02-05 | End: 2018-02-05 | Stop reason: HOSPADM

## 2018-02-05 RX ADMIN — CIPROFLOXACIN 400 MG: 2 INJECTION, SOLUTION INTRAVENOUS at 12:09

## 2018-02-05 RX ADMIN — MIDAZOLAM HYDROCHLORIDE 1 MG: 1 INJECTION, SOLUTION INTRAMUSCULAR; INTRAVENOUS at 12:07

## 2018-02-05 RX ADMIN — EPHEDRINE SULFATE 20 MG: 50 INJECTION, SOLUTION INTRAMUSCULAR; INTRAVENOUS; SUBCUTANEOUS at 12:11

## 2018-02-05 RX ADMIN — EPHEDRINE SULFATE 10 MG: 50 INJECTION, SOLUTION INTRAMUSCULAR; INTRAVENOUS; SUBCUTANEOUS at 12:13

## 2018-02-05 RX ADMIN — EPHEDRINE SULFATE 20 MG: 50 INJECTION, SOLUTION INTRAMUSCULAR; INTRAVENOUS; SUBCUTANEOUS at 12:08

## 2018-02-05 RX ADMIN — FENTANYL CITRATE 25 MCG: 50 INJECTION INTRAMUSCULAR; INTRAVENOUS at 12:05

## 2018-02-05 RX ADMIN — PHENYLEPHRINE HYDROCHLORIDE 100 MCG: 10 INJECTION INTRAVENOUS at 12:34

## 2018-02-05 RX ADMIN — SODIUM CHLORIDE, POTASSIUM CHLORIDE, SODIUM LACTATE AND CALCIUM CHLORIDE 1000 ML: 600; 310; 30; 20 INJECTION, SOLUTION INTRAVENOUS at 11:05

## 2018-02-05 RX ADMIN — MIDAZOLAM HYDROCHLORIDE 1 MG: 1 INJECTION, SOLUTION INTRAMUSCULAR; INTRAVENOUS at 12:01

## 2018-02-05 RX ADMIN — FENTANYL CITRATE 25 MCG: 50 INJECTION INTRAMUSCULAR; INTRAVENOUS at 12:22

## 2018-02-05 ASSESSMENT — PULMONARY FUNCTION TESTS
PIF_VALUE: 1
PIF_VALUE: 0
PIF_VALUE: 0
PIF_VALUE: 1
PIF_VALUE: 0
PIF_VALUE: 1
PIF_VALUE: 0
PIF_VALUE: 1
PIF_VALUE: 0
PIF_VALUE: 1
PIF_VALUE: 0
PIF_VALUE: 1
PIF_VALUE: 0
PIF_VALUE: 1
PIF_VALUE: 0
PIF_VALUE: 1
PIF_VALUE: 0
PIF_VALUE: 1
PIF_VALUE: 1
PIF_VALUE: 0
PIF_VALUE: 1
PIF_VALUE: 17
PIF_VALUE: 0
PIF_VALUE: 45
PIF_VALUE: 0
PIF_VALUE: 1
PIF_VALUE: 1

## 2018-02-05 ASSESSMENT — PAIN SCALES - GENERAL
PAINLEVEL_OUTOF10: 0
PAINLEVEL_OUTOF10: 2
PAINLEVEL_OUTOF10: 0
PAINLEVEL_OUTOF10: 0

## 2018-02-05 ASSESSMENT — PAIN DESCRIPTION - ONSET: ONSET: GRADUAL

## 2018-02-05 ASSESSMENT — PAIN DESCRIPTION - DESCRIPTORS: DESCRIPTORS: CRAMPING;ACHING

## 2018-02-05 ASSESSMENT — PAIN DESCRIPTION - PROGRESSION: CLINICAL_PROGRESSION: GRADUALLY WORSENING

## 2018-02-05 ASSESSMENT — PAIN DESCRIPTION - PAIN TYPE: TYPE: SURGICAL PAIN;ACUTE PAIN;CHRONIC PAIN

## 2018-02-05 ASSESSMENT — PAIN DESCRIPTION - FREQUENCY: FREQUENCY: INTERMITTENT

## 2018-02-05 ASSESSMENT — PAIN - FUNCTIONAL ASSESSMENT: PAIN_FUNCTIONAL_ASSESSMENT: 0-10

## 2018-02-05 ASSESSMENT — PAIN DESCRIPTION - ORIENTATION: ORIENTATION: MID;LOWER

## 2018-02-05 NOTE — PROGRESS NOTES
REPORT CALLED TO Diego Stallworth RN IN PACU BY  [facility] KIMBERLI

## 2018-02-05 NOTE — H&P
Yes    NECK: neck supple, no lymphadenopathy noted, trachea midline and straight       2+ carotid, no bruit    LUNGS: Chest expands equally bilaterally upon respiration, no accessory muscle used. Ausculation reveals no adventitious breath sounds. CARDIOVASCULAR: \"Heart sounds are normal.  Regular rate and rhythm without murmur,    ABDOMEN: Bowel sounds are present in all four quadrants      GENATALIA:Deferred. NEUROLOGIC: \"CN II-XII are grossly intact. EXTREMITIES: Pitting edema:  No,  Varicose veins: No     Dorsal pedal/posterior tibial pulses palpable: Yes         Strength:  Normal       Patient Active Problem List   Diagnosis    AAA (abdominal aortic aneurysm) (HCC)    Hypertension    Hyperlipidemia    OA (osteoarthritis) of hip    Colon polyps    Gout    GERD (gastroesophageal reflux disease)    Oropharyngeal dysphagia    Post laminectomy syndrome    Tobacco use    Ventral hernia    Obese    Chronic back pain sees Dr Elías Arellano DDD (degenerative disc disease), lumbar    Diabetic nephropathy with proteinuria ([address]    Type 2 diabetes with nephropathy ([address]    Mitral regurgitation    Hx of CABG    Macrocytosis without anemia    Coronary artery disease involving native coronary artery of native heart without angina pectoris    Ear lesion    Chondrodermatitis nodularis chronica helicis    Actinic keratosis    Neoplasm of uncertain behavior of skin    CHF (congestive heart failure) (HCC)    CAD (coronary artery disease)    Obesity (BMI 30-39. 9)    History of congestive heart failure    Central sleep apnea               IMPRESSIONS:   1.  bladder tumor   2.  does not have any pertinent problems on file.     [address]  [facility]  Electronically signed 1/30/2018 at 1:40 PM       Scheduled for:    Cysto, bilateral retrograde pyelogram , TURB-Gabriela AJCK CNP  2/5/18  10:35 AM

## 2018-02-05 NOTE — ANESTHESIA PRE PROCEDURE
mouth 2 times daily 2/1/17  Yes Negar Munroe DO   morphine sulfate ER (MS CONTIN) 30 MG CR tablet Take 30 mg by mouth 3 times daily . 3/23/16  Yes Historical Provider, MD   Blood Glucose Monitoring Suppl ROMY Glucometer- Free Style Lite 6/12/17   Negar Munroe DO   Lancet Device MISC 1 Device by Miscell. (Med.Supl.;Non-Drugs) route 3 times daily To check blood sugars. Free Style Lite 6/12/17   Negar Munroe DO   Lancets MISC Free Style Lite, Use 3 daily. Insulin Dependent 6/12/17   Negar Munroe DO   Glucose Blood (BLOOD GLUCOSE TEST STRIPS) STRP Free Style Lite, Use 3 daily. Insulin Dependent 6/12/17   Negar Munroe DO   aspirin 325 MG tablet Take 325 mg by mouth daily. Historical Provider, MD       Current medications:    No current facility-administered medications for this encounter. Allergies:     Allergies   Allergen Reactions    Baclofen Shortness Of Breath    Methadone Shortness Of Breath    Iodides Nausea And Vomiting       Problem List:    Patient Active Problem List   Diagnosis Code    AAA (abdominal aortic aneurysm) ([facility] [address] I71.4    Hypertension I10    Hyperlipidemia E78.5    OA (osteoarthritis) of hip M16.9    Colon polyps K63.5    Gout M10.9    GERD (gastroesophageal reflux disease) K21.9    Oropharyngeal dysphagia R13.12    Post laminectomy syndrome M96.1    Tobacco use Z72.0    Ventral hernia K43.9    Obese E66.9    Chronic back pain sees Dr Idalia Bolaños M54.9, G89.29    DDD (degenerative disc disease), lumbar M51.36    Diabetic nephropathy with proteinuria ([facility] [address] E11.21    Type 2 diabetes with nephropathy ([facility] [address] E11.21    Mitral regurgitation I34.0    Hx of CABG Z95.1    Macrocytosis without anemia D75.89    Coronary artery disease involving native coronary artery of native heart without angina pectoris I25.10    Ear lesion H61.899    Chondrodermatitis nodularis chronica helicis U72.765    Actinic keratosis L57.0    Neoplasm of uncertain behavior sounds,                            ROS comment: CSA  COPD   Cardiovascular:    (+) hypertension:, past MI:, CAD:, CABG/stent:, CHF:,         Rhythm: regular  Rate: normal                 ROS comment: None acute     Neuro/Psych:   (+) neuromuscular disease:,             GI/Hepatic/Renal:   (+) GERD:,           Endo/Other:    (+) Type II DM, , .                 Abdominal:           Vascular:                                        Anesthesia Plan      spinal     ASA 4     (CAD CHF PAD CSA DM)  Induction: intravenous. Anesthetic plan and risks discussed with patient and spouse. Plan discussed with CRNA.                   Brad Siddiqui MD   2/5/2018

## 2018-02-06 ENCOUNTER — OFFICE VISIT (OUTPATIENT)
Dept: UROLOGY | Age: 73
End: 2018-02-06

## 2018-02-06 VITALS — HEART RATE: 83 BPM | SYSTOLIC BLOOD PRESSURE: 118 MMHG | DIASTOLIC BLOOD PRESSURE: 69 MMHG | TEMPERATURE: 98.2 F

## 2018-02-06 DIAGNOSIS — N32.89 BLADDER MASS: Primary | ICD-10-CM

## 2018-02-06 LAB
CULTURE: NO GROWTH
CULTURE: NORMAL
Lab: NORMAL
SPECIMEN DESCRIPTION: NORMAL
STATUS: NORMAL
SURGICAL PATHOLOGY REPORT: NORMAL

## 2018-02-06 PROCEDURE — 99999 PR OFFICE/OUTPT VISIT,PROCEDURE ONLY: CPT | Performed by: NURSE PRACTITIONER

## 2018-02-07 ENCOUNTER — CARE COORDINATION (OUTPATIENT)
Dept: CARE COORDINATION | Age: 73
End: 2018-02-07

## 2018-02-12 LAB — UROTHELIAL CANCER DETECTION: NORMAL

## 2018-02-13 ENCOUNTER — OFFICE VISIT (OUTPATIENT)
Dept: UROLOGY | Age: 73
End: 2018-02-13
Payer: MEDICARE

## 2018-02-13 VITALS
SYSTOLIC BLOOD PRESSURE: 101 MMHG | WEIGHT: 229 LBS | DIASTOLIC BLOOD PRESSURE: 53 MMHG | HEIGHT: 71 IN | HEART RATE: 60 BPM | TEMPERATURE: 98 F | BODY MASS INDEX: 32.06 KG/M2

## 2018-02-13 DIAGNOSIS — C67.4 MALIGNANT NEOPLASM OF POSTERIOR WALL OF URINARY BLADDER (HCC): Primary | ICD-10-CM

## 2018-02-13 PROCEDURE — G8598 ASA/ANTIPLAT THER USED: HCPCS | Performed by: UROLOGY

## 2018-02-13 PROCEDURE — G8427 DOCREV CUR MEDS BY ELIG CLIN: HCPCS | Performed by: UROLOGY

## 2018-02-13 PROCEDURE — G8417 CALC BMI ABV UP PARAM F/U: HCPCS | Performed by: UROLOGY

## 2018-02-13 PROCEDURE — 99214 OFFICE O/P EST MOD 30 MIN: CPT | Performed by: UROLOGY

## 2018-02-13 PROCEDURE — 4004F PT TOBACCO SCREEN RCVD TLK: CPT | Performed by: UROLOGY

## 2018-02-13 PROCEDURE — 3017F COLORECTAL CA SCREEN DOC REV: CPT | Performed by: UROLOGY

## 2018-02-13 PROCEDURE — G8482 FLU IMMUNIZE ORDER/ADMIN: HCPCS | Performed by: UROLOGY

## 2018-02-13 PROCEDURE — 4040F PNEUMOC VAC/ADMIN/RCVD: CPT | Performed by: UROLOGY

## 2018-02-13 PROCEDURE — 1123F ACP DISCUSS/DSCN MKR DOCD: CPT | Performed by: UROLOGY

## 2018-02-13 ASSESSMENT — ENCOUNTER SYMPTOMS
COUGH: 0
EYE REDNESS: 0
EYE PAIN: 0
ABDOMINAL PAIN: 0
SHORTNESS OF BREATH: 0
VOMITING: 0
NAUSEA: 0
BACK PAIN: 0
WHEEZING: 0
COLOR CHANGE: 0

## 2018-02-14 DIAGNOSIS — I25.10 CORONARY ARTERY DISEASE INVOLVING NATIVE HEART, ANGINA PRESENCE UNSPECIFIED, UNSPECIFIED VESSEL OR LESION TYPE: ICD-10-CM

## 2018-02-14 RX ORDER — RANOLAZINE 500 MG/1
TABLET, FILM COATED, EXTENDED RELEASE ORAL
Qty: 180 TABLET | Refills: 2 | Status: SHIPPED | OUTPATIENT
Start: 2018-02-14 | End: 2018-11-01 | Stop reason: SDUPTHER

## 2018-02-14 NOTE — TELEPHONE ENCOUNTER
(gastroesophageal reflux disease)     Oropharyngeal dysphagia     Post laminectomy syndrome     Tobacco use     Ventral hernia     Obese     Chronic back pain sees Dr Brynn Howard     DDD (degenerative disc disease), lumbar     Diabetic nephropathy with proteinuria ([facility] [address]     Type 2 diabetes with nephropathy (HCC)     Mitral regurgitation     Hx of CABG     Macrocytosis without anemia     Coronary artery disease involving native coronary artery of native heart without angina pectoris     Ear lesion     Chondrodermatitis nodularis chronica helicis     Actinic keratosis     Neoplasm of uncertain behavior of skin     CHF (congestive heart failure) (HCC)     CAD (coronary artery disease)     Obesity (BMI 30-39. 9)     History of congestive heart failure     Central sleep apnea

## 2018-02-21 ENCOUNTER — CARE COORDINATION (OUTPATIENT)
Dept: CARE COORDINATION | Age: 73
End: 2018-02-21

## 2018-02-21 DIAGNOSIS — I25.10 CORONARY ARTERY DISEASE INVOLVING NATIVE CORONARY ARTERY OF NATIVE HEART, ANGINA PRESENCE UNSPECIFIED: ICD-10-CM

## 2018-02-21 DIAGNOSIS — M1A.0710 IDIOPATHIC CHRONIC GOUT OF RIGHT FOOT WITHOUT TOPHUS: ICD-10-CM

## 2018-02-21 DIAGNOSIS — I10 ESSENTIAL HYPERTENSION: ICD-10-CM

## 2018-02-21 DIAGNOSIS — I25.10 CORONARY ARTERY DISEASE INVOLVING NATIVE HEART, ANGINA PRESENCE UNSPECIFIED, UNSPECIFIED VESSEL OR LESION TYPE: ICD-10-CM

## 2018-02-21 DIAGNOSIS — E11.21 TYPE 2 DIABETES MELLITUS WITH DIABETIC NEPHROPATHY, UNSPECIFIED LONG TERM INSULIN USE STATUS: ICD-10-CM

## 2018-02-21 RX ORDER — POTASSIUM CHLORIDE 20 MEQ/1
20 TABLET, EXTENDED RELEASE ORAL DAILY
Qty: 90 TABLET | Refills: 3 | Status: SHIPPED | OUTPATIENT
Start: 2018-02-21 | End: 2019-02-14 | Stop reason: SDUPTHER

## 2018-02-21 RX ORDER — FUROSEMIDE 80 MG
TABLET ORAL
Qty: 90 TABLET | Refills: 0 | Status: SHIPPED | OUTPATIENT
Start: 2018-02-21 | End: 2018-03-05 | Stop reason: SDUPTHER

## 2018-02-21 RX ORDER — CAPTOPRIL 25 MG/1
TABLET ORAL
Qty: 270 TABLET | Refills: 1 | Status: SHIPPED | OUTPATIENT
Start: 2018-02-21 | End: 2018-10-10 | Stop reason: SDUPTHER

## 2018-02-21 RX ORDER — ATORVASTATIN CALCIUM 40 MG/1
TABLET, FILM COATED ORAL
Qty: 90 TABLET | Refills: 1 | Status: SHIPPED | OUTPATIENT
Start: 2018-02-21 | End: 2018-08-20 | Stop reason: SDUPTHER

## 2018-02-21 RX ORDER — ATENOLOL 25 MG/1
TABLET ORAL
Qty: 90 TABLET | Refills: 1 | Status: SHIPPED | OUTPATIENT
Start: 2018-02-21 | End: 2018-08-20 | Stop reason: SDUPTHER

## 2018-02-21 RX ORDER — ALLOPURINOL 100 MG/1
TABLET ORAL
Qty: 90 TABLET | Refills: 1 | Status: SHIPPED | OUTPATIENT
Start: 2018-02-21 | End: 2018-08-20 | Stop reason: SDUPTHER

## 2018-02-21 NOTE — CARE COORDINATION
or Interventions:  instructed on insulin start         Goals Addressed             Most Recent     Medication Management   On track (2/21/2018)             I will take my medication as directed. I will notify my provider of any problems with medications, like adverse effects or side effects. I will notify my provider/Care Coordinator if I am unable to afford my medications. I will notify my provider for advice before I stop taking any of my medication. Barriers: none  Plan for overcoming my barriers: I will follow PCP recommendation to reduce my blood sugar, agreed to start insulin  Confidence: 7/10  Anticipated Goal Completion Date: 9-8-17, next A1C            Prior to Admission medications    Medication Sig Start Date End Date Taking?  Authorizing Provider   RANEXA 500 MG extended release tablet TAKE ONE TABLET BY MOUTH TWICE A DAY 2/14/18   Anabell Balbuena CNP   metFORMIN (GLUCOPHAGE) 500 MG tablet TAKE ONE TABLET BY MOUTH THREE TIMES A DAY 1/22/18   Justin Álvarez DO   Magnesium Oxide 500 MG TABS Take 500 mg by mouth daily 1/15/18   Anabell Balbuena CNP   furosemide (LASIX) 80 MG tablet TAKE ONE TABLET BY MOUTH DAILY  Patient taking differently: TAKE ONE TABLET BY MOUTH Daily 3PM 12/6/17   Justin Álvarez DO   isosorbide mononitrate (IMDUR) 60 MG extended release tablet TAKE ONE TABLET BY MOUTH DAILY 11/27/17   Justin Álvarez DO   captopril (CAPOTEN) 25 MG tablet TAKE ONE TABLET BY MOUTH THREE TIMES A DAY 10/17/17   Justin Álvarez DO   lansoprazole (PREVACID) 30 MG delayed release capsule Take 1 capsule by mouth daily 9/22/17   Justin Álvarez DO   atorvastatin (LIPITOR) 40 MG tablet TAKE ONE TABLET BY MOUTH DAILY  Patient taking differently: TAKE ONE TABLET BY MOUTH Daily Bed time 8/29/17   Justin Álvarez DO   atenolol (TENORMIN) 25 MG tablet TAKE ONE TABLET BY MOUTH DAILY 8/29/17   Justin Álvarez DO   allopurinol (ZYLOPRIM) 100 MG tablet TAKE ONE TABLET BY MOUTH DAILY  Patient taking differently: TAKE ONE TABLET BY MOUTH Bed time 8/28/17   Negar Munroe DO   Blood Glucose Monitoring Suppl ROMY Glucometer- Free Style Lite 6/12/17   Negar Munroe DO   Lancet Device MISC 1 Device by Miscell. (Med.Supl.;Non-Drugs) route 3 times daily To check blood sugars. Free Style Lite 6/12/17   Negar Munroe DO   Lancets MISC Free Style Lite, Use 3 daily. Insulin Dependent 6/12/17   Negar Munroe DO   Glucose Blood (BLOOD GLUCOSE TEST STRIPS) STRP Free Style Lite, Use 3 daily. Insulin Dependent 6/12/17   Negar Munroe DO   DULERA 100-5 MCG/ACT inhaler INHALE TWO PUFFS BY MOUTH TWICE A DAY 4/25/17   Negar Munroe DO   potassium chloride (KLOR-CON M20) 20 MEQ extended release tablet Take 1 tablet by mouth daily 2/1/17   Negar Munroe DO   morphine sulfate ER (MS CONTIN) 30 MG CR tablet Take 30 mg by mouth 3 times daily .  3/23/16   Historical Provider, MD       Future Appointments  Date Time Provider Diandra Horton   3/7/2018 8:45 AM Louie Gold CNP [place] [facility]   3/14/2018 8:45 AM Louie Gold CNP [place] [facility]   3/21/2018 8:45 AM Louie Gold CNP [place] [facility]   3/29/2018 8:00 AM Davi Hinds MD [facility]   4/5/2018 8:00 AM Louie Gold CNP [place] [facility]   4/12/2018 8:00 AM Louie Gold CNP [place] [facility]   5/15/2018 9:30 AM Minna Stark  [facility]   8/20/2018 8:20 AM Negar Munroe DO [facility] [address] [facility]

## 2018-03-05 DIAGNOSIS — I25.10 CORONARY ARTERY DISEASE INVOLVING NATIVE HEART, ANGINA PRESENCE UNSPECIFIED, UNSPECIFIED VESSEL OR LESION TYPE: ICD-10-CM

## 2018-03-05 RX ORDER — FUROSEMIDE 80 MG
TABLET ORAL
Qty: 90 TABLET | Refills: 3 | Status: SHIPPED | OUTPATIENT
Start: 2018-03-05 | End: 2019-05-17 | Stop reason: SDUPTHER

## 2018-03-05 NOTE — TELEPHONE ENCOUNTER
Last OV 01/05/2018      Health Maintenance   Topic Date Due    DTaP/Tdap/Td vaccine (1 - Tdap) 11/25/1963    Shingles Vaccine (1 of 2 - 2 Dose Series) 11/25/1994    Smoker: low dose lung CT screening  11/25/1999    Lipid screen  11/18/2016    Diabetic foot exam  02/01/2018    Pneumococcal low/med risk (2 of 2 - PPSV23) 02/01/2018    Diabetic retinal exam  10/16/2018 (Originally 4/12/2017)    A1C test (Diabetic or Prediabetic)  10/16/2018    Potassium monitoring  02/05/2019    Creatinine monitoring  02/05/2019    Colon cancer screen colonoscopy  05/13/2021    Flu vaccine  Completed             (applicable per patient's age: Cancer Screenings, Depression Screening, Fall Risk Screening, Immunizations)    Hemoglobin A1C (%)   Date Value   10/16/2017 6.2   06/06/2017 12.2   02/01/2017 7.9     LDL Calculated (mg/dL)   Date Value   11/18/2015 74     BUN (mg/dL)   Date Value   01/30/2018 23      (goal A1C is < 7)   (goal LDL is <100) need 30-50% reduction from baseline     BP Readings from Last 3 Encounters:   02/13/18 (!) 101/53   02/06/18 118/69   02/05/18 (!) 87/58    (goal /80)      All Future Testing planned in [facility]:  Lab Frequency Next Occurrence   Lipid, Fasting Once 11/15/2017       Next Visit Date:  Future Appointments  Date Time Provider Diandra Horton   3/7/2018 8:45 AM JASON [place] [facility]   3/14/2018 8:45 AM Jhonatan Veliz CNP [place] [facility]   3/21/2018 8:45 AM Jhonatan Veliz, [address] [place] [address]   3/29/2018 8:00 AM Hernan Jones MD [place] [facility]   4/5/2018 8:00 AM Jhonatan Veliz CNP [place] [facility]   4/12/2018 8:00 AM Jhonatan Veliz CNP [place] [facility]   5/15/2018 9:30 AM Balta Smith MD [address] [place] [address]   8/22/2018 8:20 AM Herman Perez CNP [facility]            Patient Active Problem List:     AAA (abdominal aortic aneurysm) (HCC)     Hypertension     Hyperlipidemia     OA (osteoarthritis) of hip     Colon polyps     Gout GERD (gastroesophageal reflux disease)     Oropharyngeal dysphagia     Post laminectomy syndrome     Tobacco use     Ventral hernia     Obese     Chronic back pain sees Dr Brenda Galeana     DDD (degenerative disc disease), lumbar     Diabetic nephropathy with proteinuria ([address]     Type 2 diabetes with nephropathy (HCC)     Mitral regurgitation     Hx of CABG     Macrocytosis without anemia     Coronary artery disease involving native coronary artery of native heart without angina pectoris     Ear lesion     Chondrodermatitis nodularis chronica helicis     Actinic keratosis     Neoplasm of uncertain behavior of skin     CHF (congestive heart failure) (HCC)     CAD (coronary artery disease)     Obesity (BMI 30-39. 9)     History of congestive heart failure     Central sleep apnea

## 2018-03-07 ENCOUNTER — PROCEDURE VISIT (OUTPATIENT)
Dept: UROLOGY | Age: 73
End: 2018-03-07
Payer: MEDICARE

## 2018-03-07 VITALS — SYSTOLIC BLOOD PRESSURE: 148 MMHG | HEART RATE: 64 BPM | DIASTOLIC BLOOD PRESSURE: 79 MMHG

## 2018-03-07 DIAGNOSIS — Z01.812 PRE-PROCEDURE LAB EXAM: Primary | ICD-10-CM

## 2018-03-07 DIAGNOSIS — C67.9 MALIGNANT NEOPLASM OF URINARY BLADDER, UNSPECIFIED SITE (HCC): ICD-10-CM

## 2018-03-07 LAB
BILIRUBIN, POC: ABNORMAL
BLOOD URINE, POC: ABNORMAL
CLARITY, POC: CLEAR
COLOR, POC: ABNORMAL
GLUCOSE URINE, POC: ABNORMAL
KETONES, POC: ABNORMAL
LEUKOCYTE EST, POC: ABNORMAL
NITRITE, POC: ABNORMAL
PH, POC: ABNORMAL
PROTEIN, POC: ABNORMAL
SPECIFIC GRAVITY, POC: ABNORMAL
UROBILINOGEN, POC: ABNORMAL

## 2018-03-07 PROCEDURE — 81003 URINALYSIS AUTO W/O SCOPE: CPT | Performed by: NURSE PRACTITIONER

## 2018-03-07 PROCEDURE — 51720 TREATMENT OF BLADDER LESION: CPT | Performed by: NURSE PRACTITIONER

## 2018-03-07 PROCEDURE — 99999 PR OFFICE/OUTPT VISIT,PROCEDURE ONLY: CPT | Performed by: NURSE PRACTITIONER

## 2018-03-07 NOTE — PROGRESS NOTES
BCG Treatment Note:    3/7/18    Treatment 1 of 6. Risks, benefits, and some potential complications were discussed at length with the patient including infection, bleeding, voiding discomfort, fever, chills, cough, joint pain, and others. All questions were answered. Sterile technique and intraurethral analgesia were used. A 14F catheter was gently placed through the urethra and into the patients bladder. The bladder was emptied of all urine. Approximately 50 cc of BCG was instilled into the patient's bladder. The catheter was left in for 2 hours and patient was brought back into the office after allotted time to have the catheter removed. The patient was given the following instructions before leaving the office after treatment:  - The patient was instructed not to empty his bladder for at least two hours  - Void while seated to avoid splashing of urine.  - For the 6 hours following the treatment, add an equal amount of bleach to the toilet/urine to disinfect for 15 minutes before flushing.   - Use the same toilet to void in for 24 hours after the BCG, the clean toilet with bleach. - Patient given BCG Post-Treatment Information sheet. The procedure was well-tolerated and without complications. Verbal and written instructions were given to call the office for joint pain, cough,skin rash,fever (temp higher than 101.5 for more than 24 hours, or if your symptoms are intense or last longer than 72 hours. The patient stated that they understood these instructions and would comply with them. Electronically signed by Carlton Roberts on 3/7/2018 at 9:51 AM     Patient arrived at 11:30am to have catheter removed. Bladder was drained and placed into appropriate biohazard bag. Balloon was deflated and catheter was removed without complications.

## 2018-03-12 ENCOUNTER — TELEPHONE (OUTPATIENT)
Dept: PRIMARY CARE CLINIC | Age: 73
End: 2018-03-12

## 2018-03-12 RX ORDER — ONDANSETRON HYDROCHLORIDE 8 MG/1
8 TABLET, FILM COATED ORAL EVERY 12 HOURS PRN
Qty: 60 TABLET | Refills: 0 | Status: SHIPPED | OUTPATIENT
Start: 2018-03-12 | End: 2018-04-09 | Stop reason: SDUPTHER

## 2018-03-12 NOTE — TELEPHONE ENCOUNTER
Patient started cancer treatments last week and is very nauseous. He asked if he could get rx'ed some anti-nausea medication to help him eat something. Please advise. Thank you.

## 2018-03-13 ENCOUNTER — CARE COORDINATION (OUTPATIENT)
Dept: CARE COORDINATION | Age: 73
End: 2018-03-13

## 2018-03-13 NOTE — CARE COORDINATION
TAKE ONE TABLET BY MOUTH Daily Bed time 2/21/18   Cailin Hilliard CNP   captopril (CAPOTEN) 25 MG tablet TAKE ONE TABLET BY MOUTH THREE TIMES A DAY 2/21/18   Cailin Hilliard CNP   allopurinol (ZYLOPRIM) 100 MG tablet TAKE ONE TABLET BY MOUTH DAILY 2/21/18   Cailin Hilliard CNP   potassium chloride (KLOR-CON M20) 20 MEQ extended release tablet Take 1 tablet by mouth daily 2/21/18   Cailin Hilliard, JASON   RANEXA 500 MG extended release tablet TAKE ONE TABLET BY MOUTH TWICE A DAY 2/14/18   Cailin Hilliard, JASON   metFORMIN (GLUCOPHAGE) 500 MG tablet TAKE ONE TABLET BY MOUTH THREE TIMES A DAY 1/22/18   Negar Munroe DO   Magnesium Oxide 500 MG TABS Take 500 mg by mouth daily 1/15/18   Cailin Hilliard CNP   isosorbide mononitrate (IMDUR) 60 MG extended release tablet TAKE ONE TABLET BY MOUTH DAILY 11/27/17   Negar Munroe DO   lansoprazole (PREVACID) 30 MG delayed release capsule Take 1 capsule by mouth daily 9/22/17   Negar Munroe DO   Blood Glucose Monitoring Suppl ROMY Glucometer- Free Style Lite 6/12/17   Negar Munroe DO   Lancet Device MISC 1 Device by Miscell. (Med.Supl.;Non-Drugs) route 3 times daily To check blood sugars. Free Style Lite 6/12/17   Negar Munroe DO   Lancets MISC Free Style Lite, Use 3 daily. Insulin Dependent 6/12/17   Negar Munroe DO   Glucose Blood (BLOOD GLUCOSE TEST STRIPS) STRP Free Style Lite, Use 3 daily. Insulin Dependent 6/12/17   Negar Munroe DO   DULERA 100-5 MCG/ACT inhaler INHALE TWO PUFFS BY MOUTH TWICE A DAY 4/25/17   Negar Munroe DO   morphine sulfate ER (MS CONTIN) 30 MG CR tablet Take 30 mg by mouth 3 times daily .  3/23/16   Historical Provider, MD       Future Appointments  Date Time Provider Diandra Horton   3/14/2018 8:45 AM JASON [facility]   3/21/2018 8:45 AM JASON [facility]   3/29/2018 8:00 AM MD [facility]   4/5/2018 8:00 AM Danice Cooks

## 2018-03-14 ENCOUNTER — PROCEDURE VISIT (OUTPATIENT)
Dept: UROLOGY | Age: 73
End: 2018-03-14
Payer: MEDICARE

## 2018-03-14 VITALS
HEART RATE: 59 BPM | DIASTOLIC BLOOD PRESSURE: 70 MMHG | RESPIRATION RATE: 16 BRPM | WEIGHT: 212 LBS | HEIGHT: 71 IN | BODY MASS INDEX: 29.68 KG/M2 | SYSTOLIC BLOOD PRESSURE: 112 MMHG | TEMPERATURE: 98.2 F

## 2018-03-14 DIAGNOSIS — C67.9 MALIGNANT NEOPLASM OF URINARY BLADDER, UNSPECIFIED SITE (HCC): ICD-10-CM

## 2018-03-14 DIAGNOSIS — R31.29 OTHER MICROSCOPIC HEMATURIA: Primary | ICD-10-CM

## 2018-03-14 LAB
BILIRUBIN, POC: NORMAL
BLOOD URINE, POC: NORMAL
CLARITY, POC: CLEAR
COLOR, POC: YELLOW
GLUCOSE URINE, POC: NORMAL
KETONES, POC: NORMAL
LEUKOCYTE EST, POC: NORMAL
NITRITE, POC: NORMAL
PH, POC: NORMAL
PROTEIN, POC: NORMAL
SPECIFIC GRAVITY, POC: NORMAL
UROBILINOGEN, POC: NORMAL

## 2018-03-14 PROCEDURE — 81003 URINALYSIS AUTO W/O SCOPE: CPT | Performed by: NURSE PRACTITIONER

## 2018-03-14 PROCEDURE — 99999 PR OFFICE/OUTPT VISIT,PROCEDURE ONLY: CPT | Performed by: NURSE PRACTITIONER

## 2018-03-14 PROCEDURE — 51720 TREATMENT OF BLADDER LESION: CPT | Performed by: NURSE PRACTITIONER

## 2018-03-16 ENCOUNTER — CARE COORDINATION (OUTPATIENT)
Dept: CARE COORDINATION | Age: 73
End: 2018-03-16

## 2018-03-16 NOTE — CARE COORDINATION
TABLET BY MOUTH THREE TIMES A DAY 2/21/18   Markos Álvarez CNP   allopurinol (ZYLOPRIM) 100 MG tablet TAKE ONE TABLET BY MOUTH DAILY 2/21/18   Markos Álvarez CNP   potassium chloride (KLOR-CON M20) 20 MEQ extended release tablet Take 1 tablet by mouth daily 2/21/18   Markos Álvarez CNP   RANEXA 500 MG extended release tablet TAKE ONE TABLET BY MOUTH TWICE A DAY 2/14/18   Markos Álvarez CNP   metFORMIN (GLUCOPHAGE) 500 MG tablet TAKE ONE TABLET BY MOUTH THREE TIMES A DAY 1/22/18   Yanet Granados DO   Magnesium Oxide 500 MG TABS Take 500 mg by mouth daily 1/15/18   Markos Álvarez CNP   isosorbide mononitrate (IMDUR) 60 MG extended release tablet TAKE ONE TABLET BY MOUTH DAILY 11/27/17   Yanet Granados DO   lansoprazole (PREVACID) 30 MG delayed release capsule Take 1 capsule by mouth daily 9/22/17   Yanet Granados DO   Blood Glucose Monitoring Suppl ROMY Glucometer- Free Style Lite 6/12/17   Yanet Granados DO   Lancet Device MISC 1 Device by [employer]. (Med.Supl.;Non-Drugs) route 3 times daily To check blood sugars. Free Style Lite 6/12/17   Yanet Granados DO   Lancets MISC Free Style Lite, Use 3 daily. Insulin Dependent 6/12/17   Yanet Granados DO   Glucose Blood (BLOOD GLUCOSE TEST STRIPS) STRP Free Style Lite, Use 3 daily. Insulin Dependent 6/12/17   Yanet Granados DO   DULERA 100-5 MCG/ACT inhaler INHALE TWO PUFFS BY MOUTH TWICE A DAY 4/25/17   Yanet Granados DO   morphine sulfate ER (MS CONTIN) 30 MG CR tablet Take 30 mg by mouth 3 times daily .  3/23/16   Historical Provider, MD       Future Appointments  Date Time Provider Diandra Horton   3/21/2018 8:45 AM Ritika Cespedes CNP [place] [facility]   3/29/2018 8:00 AM MD Kim [facility]illo [facility]   4/5/2018 8:00 AM Ritika Cespedes CNP [place] [facility]   4/12/2018 8:00 AM Ritika Cespedes CNP [place] [facility]   5/15/2018 9:30 AM Lizzie Clemens MD [address] [place] [address]   8/22/2018 8:20 AM Jazmin Alvarez

## 2018-03-21 ENCOUNTER — PROCEDURE VISIT (OUTPATIENT)
Dept: UROLOGY | Age: 73
End: 2018-03-21
Payer: MEDICARE

## 2018-03-21 VITALS
WEIGHT: 212.08 LBS | HEIGHT: 71 IN | DIASTOLIC BLOOD PRESSURE: 75 MMHG | RESPIRATION RATE: 16 BRPM | HEART RATE: 62 BPM | TEMPERATURE: 98.4 F | BODY MASS INDEX: 29.69 KG/M2 | SYSTOLIC BLOOD PRESSURE: 109 MMHG

## 2018-03-21 DIAGNOSIS — C67.9 MALIGNANT NEOPLASM OF URINARY BLADDER, UNSPECIFIED SITE (HCC): ICD-10-CM

## 2018-03-21 DIAGNOSIS — Z01.812 PRE-PROCEDURE LAB EXAM: Primary | ICD-10-CM

## 2018-03-21 PROCEDURE — 99999 PR OFFICE/OUTPT VISIT,PROCEDURE ONLY: CPT | Performed by: NURSE PRACTITIONER

## 2018-03-21 PROCEDURE — 51720 TREATMENT OF BLADDER LESION: CPT | Performed by: NURSE PRACTITIONER

## 2018-03-21 PROCEDURE — 81003 URINALYSIS AUTO W/O SCOPE: CPT | Performed by: NURSE PRACTITIONER

## 2018-03-26 DIAGNOSIS — K21.9 GASTROESOPHAGEAL REFLUX DISEASE, ESOPHAGITIS PRESENCE NOT SPECIFIED: ICD-10-CM

## 2018-03-26 RX ORDER — LANSOPRAZOLE 30 MG/1
30 CAPSULE, DELAYED RELEASE ORAL DAILY
Qty: 90 CAPSULE | Refills: 1 | Status: SHIPPED | OUTPATIENT
Start: 2018-03-26 | End: 2018-09-20 | Stop reason: SDUPTHER

## 2018-03-29 ENCOUNTER — PROCEDURE VISIT (OUTPATIENT)
Dept: UROLOGY | Age: 73
End: 2018-03-29
Payer: MEDICARE

## 2018-03-29 VITALS
HEART RATE: 78 BPM | WEIGHT: 208 LBS | SYSTOLIC BLOOD PRESSURE: 138 MMHG | BODY MASS INDEX: 29.02 KG/M2 | TEMPERATURE: 98.6 F | DIASTOLIC BLOOD PRESSURE: 86 MMHG

## 2018-03-29 DIAGNOSIS — R31.29 OTHER MICROSCOPIC HEMATURIA: Primary | ICD-10-CM

## 2018-03-29 LAB
BILIRUBIN, POC: ABNORMAL
BLOOD URINE, POC: ABNORMAL
CLARITY, POC: CLEAR
COLOR, POC: YELLOW
GLUCOSE URINE, POC: ABNORMAL
KETONES, POC: ABNORMAL
LEUKOCYTE EST, POC: ABNORMAL
NITRITE, POC: ABNORMAL
PH, POC: ABNORMAL
PROTEIN, POC: ABNORMAL
SPECIFIC GRAVITY, POC: ABNORMAL
UROBILINOGEN, POC: ABNORMAL

## 2018-03-29 PROCEDURE — 81003 URINALYSIS AUTO W/O SCOPE: CPT | Performed by: UROLOGY

## 2018-03-29 PROCEDURE — 99999 PR OFFICE/OUTPT VISIT,PROCEDURE ONLY: CPT | Performed by: UROLOGY

## 2018-04-05 ENCOUNTER — PROCEDURE VISIT (OUTPATIENT)
Dept: UROLOGY | Age: 73
End: 2018-04-05
Payer: MEDICARE

## 2018-04-05 VITALS
TEMPERATURE: 98.4 F | SYSTOLIC BLOOD PRESSURE: 112 MMHG | HEIGHT: 71 IN | WEIGHT: 207.89 LBS | RESPIRATION RATE: 16 BRPM | HEART RATE: 62 BPM | DIASTOLIC BLOOD PRESSURE: 69 MMHG | BODY MASS INDEX: 29.1 KG/M2

## 2018-04-05 DIAGNOSIS — C67.9 MALIGNANT NEOPLASM OF URINARY BLADDER, UNSPECIFIED SITE (HCC): ICD-10-CM

## 2018-04-05 DIAGNOSIS — R31.29 OTHER MICROSCOPIC HEMATURIA: Primary | ICD-10-CM

## 2018-04-05 PROCEDURE — 51720 TREATMENT OF BLADDER LESION: CPT | Performed by: NURSE PRACTITIONER

## 2018-04-05 PROCEDURE — 81003 URINALYSIS AUTO W/O SCOPE: CPT | Performed by: NURSE PRACTITIONER

## 2018-04-05 PROCEDURE — 99999 PR OFFICE/OUTPT VISIT,PROCEDURE ONLY: CPT | Performed by: NURSE PRACTITIONER

## 2018-04-09 RX ORDER — ONDANSETRON HYDROCHLORIDE 8 MG/1
8 TABLET, FILM COATED ORAL EVERY 12 HOURS PRN
Qty: 60 TABLET | Refills: 2 | Status: SHIPPED | OUTPATIENT
Start: 2018-04-09 | End: 2020-05-07 | Stop reason: SDUPTHER

## 2018-04-11 ENCOUNTER — CARE COORDINATION (OUTPATIENT)
Dept: CARE COORDINATION | Age: 73
End: 2018-04-11

## 2018-04-12 ENCOUNTER — PROCEDURE VISIT (OUTPATIENT)
Dept: UROLOGY | Age: 73
End: 2018-04-12
Payer: MEDICARE

## 2018-04-12 VITALS — DIASTOLIC BLOOD PRESSURE: 73 MMHG | SYSTOLIC BLOOD PRESSURE: 113 MMHG | TEMPERATURE: 98.5 F | HEART RATE: 70 BPM

## 2018-04-12 DIAGNOSIS — R31.9 HEMATURIA, UNSPECIFIED TYPE: Primary | ICD-10-CM

## 2018-04-12 DIAGNOSIS — C67.9 MALIGNANT NEOPLASM OF URINARY BLADDER, UNSPECIFIED SITE (HCC): ICD-10-CM

## 2018-04-12 LAB
BILIRUBIN, POC: ABNORMAL
BLOOD URINE, POC: ABNORMAL
CLARITY, POC: ABNORMAL
COLOR, POC: ABNORMAL
GLUCOSE URINE, POC: ABNORMAL
KETONES, POC: ABNORMAL
LEUKOCYTE EST, POC: ABNORMAL
NITRITE, POC: ABNORMAL
PH, POC: ABNORMAL
PROTEIN, POC: ABNORMAL
SPECIFIC GRAVITY, POC: ABNORMAL
UROBILINOGEN, POC: ABNORMAL

## 2018-04-12 PROCEDURE — 99999 PR OFFICE/OUTPT VISIT,PROCEDURE ONLY: CPT | Performed by: NURSE PRACTITIONER

## 2018-04-12 PROCEDURE — 51720 TREATMENT OF BLADDER LESION: CPT | Performed by: NURSE PRACTITIONER

## 2018-04-12 PROCEDURE — 81003 URINALYSIS AUTO W/O SCOPE: CPT | Performed by: NURSE PRACTITIONER

## 2018-04-19 ENCOUNTER — PROCEDURE VISIT (OUTPATIENT)
Dept: UROLOGY | Age: 73
End: 2018-04-19
Payer: MEDICARE

## 2018-04-19 VITALS — DIASTOLIC BLOOD PRESSURE: 75 MMHG | TEMPERATURE: 98.3 F | SYSTOLIC BLOOD PRESSURE: 112 MMHG | HEART RATE: 66 BPM

## 2018-04-19 DIAGNOSIS — R31.9 HEMATURIA, UNSPECIFIED TYPE: Primary | ICD-10-CM

## 2018-04-19 DIAGNOSIS — C67.9 MALIGNANT NEOPLASM OF URINARY BLADDER, UNSPECIFIED SITE (HCC): ICD-10-CM

## 2018-04-19 PROCEDURE — 81003 URINALYSIS AUTO W/O SCOPE: CPT | Performed by: NURSE PRACTITIONER

## 2018-04-19 PROCEDURE — 99999 PR OFFICE/OUTPT VISIT,PROCEDURE ONLY: CPT | Performed by: NURSE PRACTITIONER

## 2018-04-19 PROCEDURE — 51720 TREATMENT OF BLADDER LESION: CPT | Performed by: NURSE PRACTITIONER

## 2018-05-02 ENCOUNTER — CARE COORDINATION (OUTPATIENT)
Dept: CARE COORDINATION | Age: 73
End: 2018-05-02

## 2018-05-24 DIAGNOSIS — R19.7 DIARRHEA, UNSPECIFIED TYPE: Primary | ICD-10-CM

## 2018-05-30 RX ORDER — ISOSORBIDE MONONITRATE 60 MG/1
TABLET, EXTENDED RELEASE ORAL
Qty: 90 TABLET | Refills: 1 | Status: SHIPPED | OUTPATIENT
Start: 2018-05-30 | End: 2018-11-27 | Stop reason: SDUPTHER

## 2018-06-05 ENCOUNTER — CARE COORDINATION (OUTPATIENT)
Dept: CARE COORDINATION | Age: 73
End: 2018-06-05

## 2018-06-12 ENCOUNTER — PROCEDURE VISIT (OUTPATIENT)
Dept: UROLOGY | Age: 73
End: 2018-06-12
Payer: MEDICARE

## 2018-06-12 ENCOUNTER — HOSPITAL ENCOUNTER (OUTPATIENT)
Age: 73
Setting detail: SPECIMEN
Discharge: HOME | End: 2018-06-12
Payer: MEDICARE

## 2018-06-12 VITALS
TEMPERATURE: 98.3 F | HEIGHT: 71 IN | SYSTOLIC BLOOD PRESSURE: 102 MMHG | WEIGHT: 225 LBS | BODY MASS INDEX: 31.5 KG/M2 | DIASTOLIC BLOOD PRESSURE: 60 MMHG | HEART RATE: 57 BPM

## 2018-06-12 DIAGNOSIS — C67.8 MALIGNANT NEOPLASM OF OVERLAPPING SITES OF BLADDER (HCC): Primary | ICD-10-CM

## 2018-06-12 DIAGNOSIS — C67.8 MALIGNANT NEOPLASM OF OVERLAPPING SITES OF BLADDER (HCC): ICD-10-CM

## 2018-06-12 DIAGNOSIS — R30.0 DYSURIA: ICD-10-CM

## 2018-06-12 PROCEDURE — 52000 CYSTOURETHROSCOPY: CPT | Performed by: UROLOGY

## 2018-06-12 PROCEDURE — 99999 PR OFFICE/OUTPT VISIT,PROCEDURE ONLY: CPT | Performed by: UROLOGY

## 2018-06-12 RX ORDER — AMOXICILLIN 500 MG/1
1 CAPSULE ORAL 4 TIMES DAILY
COMMUNITY
Start: 2018-06-11 | End: 2018-09-04 | Stop reason: ALTCHOICE

## 2018-06-13 LAB
CULTURE: NO GROWTH
CULTURE: NORMAL
Lab: NORMAL
SPECIMEN DESCRIPTION: NORMAL
STATUS: NORMAL

## 2018-06-18 LAB — UROTHELIAL CANCER DETECTION: NORMAL

## 2018-06-20 ENCOUNTER — CARE COORDINATION (OUTPATIENT)
Dept: CARE COORDINATION | Age: 73
End: 2018-06-20

## 2018-06-25 DIAGNOSIS — E11.21 TYPE II DIABETES MELLITUS WITH NEPHROPATHY (HCC): ICD-10-CM

## 2018-06-25 RX ORDER — RIFAXIMIN 550 MG/1
TABLET ORAL
Qty: 28 TABLET | Refills: 0 | Status: SHIPPED | OUTPATIENT
Start: 2018-06-25 | End: 2018-10-11

## 2018-06-25 RX ORDER — GLUCOSAMINE HCL/CHONDROITIN SU 500-400 MG
CAPSULE ORAL
Qty: 300 STRIP | Refills: 5 | Status: SHIPPED | OUTPATIENT
Start: 2018-06-25 | End: 2019-07-23 | Stop reason: SDUPTHER

## 2018-07-03 DIAGNOSIS — J42 CHRONIC BRONCHITIS, UNSPECIFIED CHRONIC BRONCHITIS TYPE (HCC): ICD-10-CM

## 2018-07-03 NOTE — TELEPHONE ENCOUNTER
Last OV 1/5/18  Upcoming appt 8/22/18    Health Maintenance   Topic Date Due    DTaP/Tdap/Td vaccine (1 - Tdap) 11/25/1963    Shingles Vaccine (1 of 2 - 2 Dose Series) 11/25/1994    Lipid screen  11/18/2016    Diabetic foot exam  02/01/2018    Pneumococcal low/med risk (2 of 2 - PPSV23) 02/01/2018    Diabetic retinal exam  10/16/2018 (Originally 4/12/2017)    Flu vaccine (1) 09/01/2018    A1C test (Diabetic or Prediabetic)  10/16/2018    Potassium monitoring  02/05/2019    Creatinine monitoring  02/05/2019    Colon cancer screen colonoscopy  05/13/2021             (applicable per patient's age: Cancer Screenings, Depression Screening, Fall Risk Screening, Immunizations)    Hemoglobin A1C (%)   Date Value   10/16/2017 6.2   06/06/2017 12.2   02/01/2017 7.9     LDL Calculated (mg/dL)   Date Value   11/18/2015 74     BUN (mg/dL)   Date Value   01/30/2018 23      (goal A1C is < 7)   (goal LDL is <100) need 30-50% reduction from baseline     BP Readings from Last 3 Encounters:   06/12/18 102/60   04/19/18 112/75   04/12/18 113/73    (goal /80)      All Future Testing planned in [facility]:  Lab Frequency Next Occurrence   Lipid, Fasting Once 11/15/2017       Next Visit Date:  Future Appointments  Date Time Provider Diandra Horton   8/22/2018 8:20 AM JOSE Monique - CNP [place]  [facility]   9/18/2018 8:30 AM Monika Lyles MD [address] [place] [address]            Patient Active Problem List:     AAA (abdominal aortic aneurysm) ([address]     Hypertension     Hyperlipidemia     OA (osteoarthritis) of hip     Colon polyps     Gout     GERD (gastroesophageal reflux disease)     Oropharyngeal dysphagia     Post laminectomy syndrome     Tobacco use     Ventral hernia     Obese     Chronic back pain sees Dr Reynaldo Bhagat     DDD (degenerative disc disease), lumbar     Diabetic nephropathy with proteinuria ([address]     Type 2 diabetes with nephropathy ([address]     Mitral regurgitation     Hx of CABG     Macrocytosis without

## 2018-07-13 ENCOUNTER — CARE COORDINATION (OUTPATIENT)
Dept: CARE COORDINATION | Age: 73
End: 2018-07-13

## 2018-07-13 DIAGNOSIS — I10 ESSENTIAL HYPERTENSION: ICD-10-CM

## 2018-07-13 DIAGNOSIS — E11.21 TYPE 2 DIABETES WITH NEPHROPATHY (HCC): Primary | ICD-10-CM

## 2018-07-13 DIAGNOSIS — Z13.0 SCREENING FOR DEFICIENCY ANEMIA: ICD-10-CM

## 2018-07-13 DIAGNOSIS — E78.5 HYPERLIPIDEMIA, UNSPECIFIED HYPERLIPIDEMIA TYPE: ICD-10-CM

## 2018-07-13 RX ORDER — RIFAXIMIN 550 MG/1
TABLET ORAL
Qty: 28 TABLET | Refills: 0 | OUTPATIENT
Start: 2018-07-13

## 2018-07-13 NOTE — TELEPHONE ENCOUNTER
Call to Pt explained providers concern , Pt stated that he doesn't need a refill he just got one and doesn't know why [facility] is asking for refill .  Pt stated he will see you in August

## 2018-07-13 NOTE — CARE COORDINATION
notify my provider/Care Coordinator if I am unable to afford my medications. I will notify my provider for advice before I stop taking any of my medication. Barriers: none  Plan for overcoming my barriers: I will follow PCP recommendation to reduce my blood sugar, agreed to start insulin  Confidence: 7/10  Anticipated Goal Completion Date: 9-8-17, next A1C            Prior to Admission medications    Medication Sig Start Date End Date Taking? Authorizing Provider   mometasone-formoterol (DULERA) 100-5 MCG/ACT inhaler INHALE TWO PUFFS BY MOUTH TWICE A DAY 7/3/18   JOSE Zuniga CNP   XIFAXAN 550 MG tablet TAKE ONE TABLET BY MOUTH TWICE A DAY FOR 14 DAYS 6/25/18   JOSE Zuniga CNP   Glucose Blood (BLOOD GLUCOSE TEST STRIPS) STRP Free Style Lite, Use 3 daily.  Insulin Dependent 6/25/18   JOSE Boston CNP   metFORMIN (GLUCOPHAGE) 500 MG tablet TAKE ONE TABLET BY MOUTH THREE TIMES A DAY 6/21/18   JOSE Zuniga CNP   amoxicillin (AMOXIL) 500 MG capsule Take 1 capsule by mouth 4 times daily 6/11/18   Historical Provider, MD   isosorbide mononitrate (IMDUR) 60 MG extended release tablet TAKE ONE TABLET BY MOUTH DAILY 5/30/18   JOSE Zuniga CNP   rifaximin (XIFAXAN) 550 MG tablet TAKE ONE TABLET BY MOUTH TWICE A DAY FOR 14 DAYS 5/24/18   JOSE Zuniga CNP   ondansetron (ZOFRAN) 8 MG tablet Take 1 tablet by mouth every 12 hours as needed for Nausea or Vomiting 4/9/18   JOSE Zuniga CNP   lansoprazole (PREVACID) 30 MG delayed release capsule Take 1 capsule by mouth daily 3/26/18   JOSE Zuniga CNP   furosemide (LASIX) 80 MG tablet TAKE ONE TABLET BY MOUTH DAILY 3/5/18   JOSE Boston CNP   atenolol (TENORMIN) 25 MG tablet TAKE ONE TABLET BY MOUTH DAILY 2/21/18   JOSE Zuniga CNP   atorvastatin (LIPITOR) 40 MG tablet TAKE ONE TABLET BY MOUTH Daily Bed time 2/21/18   JOSE Zuniga CNP   captopril (CAPOTEN) 25 MG tablet TAKE ONE TABLET BY MOUTH THREE TIMES A DAY 2/21/18   Pino Kerr, APRN - CNP   allopurinol (ZYLOPRIM) 100 MG tablet TAKE ONE TABLET BY MOUTH DAILY 2/21/18   Pino Kerr, APRN - CNP   potassium chloride (KLOR-CON M20) 20 MEQ extended release tablet Take 1 tablet by mouth daily 2/21/18   Pino Kerr, APRN - CNP   RANEXA 500 MG extended release tablet TAKE ONE TABLET BY MOUTH TWICE A DAY 2/14/18   Pino Kerr, APRN - CNP   Magnesium Oxide 500 MG TABS Take 500 mg by mouth daily 1/15/18   Pino Kerr, APRN - CNP   Blood Glucose Monitoring Suppl ROMY Glucometer- Free Style Lite 6/12/17   Prema Bertrand DO   Lancet Device MISC 1 Device by Miscell. (Med.Supl.;Non-Drugs) route 3 times daily To check blood sugars. Free Style Lite 6/12/17   Prema Bertrand DO   Lancets MISC Free Style Lite, Use 3 daily. Insulin Dependent 6/12/17   Prema Bertrand DO   morphine sulfate ER (MS CONTIN) 30 MG CR tablet Take 30 mg by mouth 3 times daily .  3/23/16   Historical Provider, MD       Future Appointments  Date Time Provider Diandra Horton   8/22/2018 8:20 AM JOSE Delarosa CNP [place]  [facility]   9/18/2018 8:30 AM Ashley Wong MD [address] [place] [address]

## 2018-07-13 NOTE — TELEPHONE ENCOUNTER
Last OV 01/05/2018  Health Maintenance   Topic Date Due    DTaP/Tdap/Td vaccine (1 - Tdap) 11/25/1963    Shingles Vaccine (1 of 2 - 2 Dose Series) 11/25/1994    Lipid screen  11/18/2016    Diabetic foot exam  02/01/2018    Pneumococcal low/med risk (2 of 2 - PPSV23) 02/01/2018    Diabetic retinal exam  10/16/2018 (Originally 4/12/2017)    Flu vaccine (1) 09/01/2018    A1C test (Diabetic or Prediabetic)  10/16/2018    Potassium monitoring  02/05/2019    Creatinine monitoring  02/05/2019    Colon cancer screen colonoscopy  05/13/2021             (applicable per patient's age: Cancer Screenings, Depression Screening, Fall Risk Screening, Immunizations)    Hemoglobin A1C (%)   Date Value   10/16/2017 6.2   06/06/2017 12.2   02/01/2017 7.9     LDL Calculated (mg/dL)   Date Value   11/18/2015 74     BUN (mg/dL)   Date Value   01/30/2018 23      (goal A1C is < 7)   (goal LDL is <100) need 30-50% reduction from baseline     BP Readings from Last 3 Encounters:   06/12/18 102/60   04/19/18 112/75   04/12/18 113/73    (goal /80)      All Future Testing planned in [facility]:  Lab Frequency Next Occurrence   Lipid, Fasting Once 11/15/2017       Next Visit Date:  Future Appointments  Date Time Provider Diandra Horton   8/22/2018 8:20 AM JOSE Christiansen - CNP [facility]   9/18/2018 8:30 AM Jose Mckoy MD [address] [place] [address]            Patient Active Problem List:     AAA (abdominal aortic aneurysm) ([address]     Hypertension     Hyperlipidemia     OA (osteoarthritis) of hip     Colon polyps     Gout     GERD (gastroesophageal reflux disease)     Oropharyngeal dysphagia     Post laminectomy syndrome     Tobacco use     Ventral hernia     Obese     Chronic back pain sees Dr Suzy Alva     DDD (degenerative disc disease), lumbar     Diabetic nephropathy with proteinuria ([address]     Type 2 diabetes with nephropathy ([address]     Mitral regurgitation     Hx of CABG     Macrocytosis without anemia     Coronary

## 2018-07-24 DIAGNOSIS — E11.21 TYPE II DIABETES MELLITUS WITH NEPHROPATHY (HCC): ICD-10-CM

## 2018-07-24 RX ORDER — LANCETS 30 GAUGE
EACH MISCELLANEOUS
Qty: 300 EACH | Refills: 2 | Status: SHIPPED | OUTPATIENT
Start: 2018-07-24 | End: 2019-04-09 | Stop reason: SDUPTHER

## 2018-07-24 NOTE — TELEPHONE ENCOUNTER
Last OV 1/5/18    Health Maintenance   Topic Date Due    DTaP/Tdap/Td vaccine (1 - Tdap) 11/25/1963    Shingles Vaccine (1 of 2 - 2 Dose Series) 11/25/1994    Lipid screen  11/18/2016    Diabetic foot exam  02/01/2018    Pneumococcal low/med risk (2 of 2 - PPSV23) 02/01/2018    Diabetic retinal exam  10/16/2018 (Originally 4/12/2017)    Flu vaccine (1) 09/01/2018    A1C test (Diabetic or Prediabetic)  10/16/2018    Potassium monitoring  02/05/2019    Creatinine monitoring  02/05/2019    Colon cancer screen colonoscopy  05/13/2021             (applicable per patient's age: Cancer Screenings, Depression Screening, Fall Risk Screening, Immunizations)    Hemoglobin A1C (%)   Date Value   10/16/2017 6.2   06/06/2017 12.2   02/01/2017 7.9     LDL Calculated (mg/dL)   Date Value   11/18/2015 74     BUN (mg/dL)   Date Value   01/30/2018 23      (goal A1C is < 7)   (goal LDL is <100) need 30-50% reduction from baseline     BP Readings from Last 3 Encounters:   06/12/18 102/60   04/19/18 112/75   04/12/18 113/73    (goal /80)      All Future Testing planned in [facility]:  Lab Frequency Next Occurrence   Lipid, Fasting Once 11/15/2017   Lipid Panel Once 10/13/2018   CBC Once 10/13/2018   Comprehensive Metabolic Panel Once 59/75/0819   Hemoglobin A1C Once 10/13/2018       Next Visit Date:  Future Appointments  Date Time Provider Diandra Horton   8/22/2018 8:20 AM JOSE Ramírez - CNP [facility]   9/18/2018 8:30 AM Jeff Miller MD [address] [place] [address]            Patient Active Problem List:     AAA (abdominal aortic aneurysm) ([facility] [address]     Hypertension     Hyperlipidemia     OA (osteoarthritis) of hip     Colon polyps     Gout     GERD (gastroesophageal reflux disease)     Oropharyngeal dysphagia     Post laminectomy syndrome     Tobacco use     Ventral hernia     Obese     Chronic back pain sees Dr Mika Early     DDD (degenerative disc disease), lumbar     Diabetic nephropathy with proteinuria

## 2018-07-27 ENCOUNTER — CARE COORDINATION (OUTPATIENT)
Dept: CARE COORDINATION | Age: 73
End: 2018-07-27

## 2018-07-27 NOTE — CARE COORDINATION
JOSE Gibson CNP   potassium chloride (KLOR-CON M20) 20 MEQ extended release tablet Take 1 tablet by mouth daily 2/21/18   JOSE Gibson CNP   RANEXA 500 MG extended release tablet TAKE ONE TABLET BY MOUTH TWICE A DAY 2/14/18   JOSE Gibson CNP   Magnesium Oxide 500 MG TABS Take 500 mg by mouth daily 1/15/18   JOSE Gibson CNP   Blood Glucose Monitoring Suppl ROMY Glucometer- Free Style Lite 6/12/17   Jett Jama DO   Lancet Device MISC 1 Device by [employer]. (Med.Supl.;Non-Drugs) route 3 times daily To check blood sugars. Free Style Lite 6/12/17   Jett Jama DO   morphine sulfate ER (MS CONTIN) 30 MG CR tablet Take 30 mg by mouth 3 times daily .  3/23/16   Historical Provider, MD       Future Appointments  Date Time Provider Diandra Horton   8/22/2018 8:20 AM JOSE Gibson CNP [facility]   9/18/2018 8:30 AM Arias Saravia MD [address] [place] [address]

## 2018-08-01 ENCOUNTER — HOSPITAL ENCOUNTER (OUTPATIENT)
Age: 73
Discharge: HOME | End: 2018-08-01
Payer: MEDICARE

## 2018-08-01 LAB
ABSOLUTE EOS #: 0.25 K/UL (ref 0–0.44)
ABSOLUTE IMMATURE GRANULOCYTE: <0.03 K/UL (ref 0–0.3)
ABSOLUTE LYMPH #: 2.69 K/UL (ref 1.1–3.7)
ABSOLUTE MONO #: 0.6 K/UL (ref 0.1–1.2)
ALBUMIN SERPL-MCNC: 3.9 G/DL (ref 3.5–5.2)
ALBUMIN/GLOBULIN RATIO: 1.1 (ref 1–2.5)
ALP BLD-CCNC: 73 U/L (ref 40–129)
ALT SERPL-CCNC: 11 U/L (ref 5–41)
ANION GAP SERPL CALCULATED.3IONS-SCNC: 15 MMOL/L (ref 9–17)
AST SERPL-CCNC: 16 U/L
BASOPHILS # BLD: 1 % (ref 0–2)
BASOPHILS ABSOLUTE: 0.06 K/UL (ref 0–0.2)
BILIRUB SERPL-MCNC: 0.43 MG/DL (ref 0.3–1.2)
BUN BLDV-MCNC: 27 MG/DL (ref 8–23)
BUN/CREAT BLD: ABNORMAL (ref 9–20)
CALCIUM SERPL-MCNC: 9.4 MG/DL (ref 8.6–10.4)
CHLORIDE BLD-SCNC: 101 MMOL/L (ref 98–107)
CHOLESTEROL, FASTING: 120 MG/DL
CHOLESTEROL/HDL RATIO: 3.3
CO2: 24 MMOL/L (ref 20–31)
CREAT SERPL-MCNC: 1.37 MG/DL (ref 0.7–1.2)
DIFFERENTIAL TYPE: ABNORMAL
EOSINOPHILS RELATIVE PERCENT: 3 % (ref 1–4)
GFR AFRICAN AMERICAN: >60 ML/MIN
GFR NON-AFRICAN AMERICAN: 51 ML/MIN
GFR SERPL CREATININE-BSD FRML MDRD: ABNORMAL ML/MIN/{1.73_M2}
GFR SERPL CREATININE-BSD FRML MDRD: ABNORMAL ML/MIN/{1.73_M2}
GLUCOSE BLD-MCNC: 120 MG/DL (ref 70–99)
HCT VFR BLD CALC: 37.3 % (ref 40.7–50.3)
HDLC SERPL-MCNC: 36 MG/DL
HEMOGLOBIN: 12.2 G/DL (ref 13–17)
IMMATURE GRANULOCYTES: 0 %
LDL CHOLESTEROL: 61 MG/DL (ref 0–130)
LYMPHOCYTES # BLD: 35 % (ref 24–43)
MCH RBC QN AUTO: 33.4 PG (ref 25.2–33.5)
MCHC RBC AUTO-ENTMCNC: 32.7 G/DL (ref 28.4–34.8)
MCV RBC AUTO: 102.2 FL (ref 82.6–102.9)
MONOCYTES # BLD: 8 % (ref 3–12)
NRBC AUTOMATED: 0 PER 100 WBC
PDW BLD-RTO: 15.2 % (ref 11.8–14.4)
PLATELET # BLD: 203 K/UL (ref 138–453)
PLATELET ESTIMATE: ABNORMAL
PMV BLD AUTO: 10 FL (ref 8.1–13.5)
POTASSIUM SERPL-SCNC: 4.9 MMOL/L (ref 3.7–5.3)
RBC # BLD: 3.65 M/UL (ref 4.21–5.77)
RBC # BLD: ABNORMAL 10*6/UL
SEG NEUTROPHILS: 53 % (ref 36–65)
SEGMENTED NEUTROPHILS ABSOLUTE COUNT: 4.08 K/UL (ref 1.5–8.1)
SODIUM BLD-SCNC: 140 MMOL/L (ref 135–144)
TOTAL PROTEIN: 7.5 G/DL (ref 6.4–8.3)
TRIGLYCERIDE, FASTING: 113 MG/DL
TSH SERPL DL<=0.05 MIU/L-ACNC: 2.22 MIU/L (ref 0.3–5)
VLDLC SERPL CALC-MCNC: ABNORMAL MG/DL (ref 1–30)
WBC # BLD: 7.7 K/UL (ref 3.5–11.3)
WBC # BLD: ABNORMAL 10*3/UL

## 2018-08-01 PROCEDURE — 84443 ASSAY THYROID STIM HORMONE: CPT

## 2018-08-01 PROCEDURE — 80053 COMPREHEN METABOLIC PANEL: CPT

## 2018-08-01 PROCEDURE — 85025 COMPLETE CBC W/AUTO DIFF WBC: CPT

## 2018-08-01 PROCEDURE — 80061 LIPID PANEL: CPT

## 2018-08-01 PROCEDURE — 36415 COLL VENOUS BLD VENIPUNCTURE: CPT

## 2018-08-13 ENCOUNTER — CARE COORDINATION (OUTPATIENT)
Dept: CARE COORDINATION | Age: 73
End: 2018-08-13

## 2018-08-13 NOTE — CARE COORDINATION
Attempt to contact patient today regarding care coordination, unable to reach. Recent encounters and notes reviewed. How are blood sugars?     Reviewed lab results , A1C was not completed  Appt 8/22/18    Yenifer Mcbride RN

## 2018-08-20 DIAGNOSIS — I25.10 CORONARY ARTERY DISEASE INVOLVING NATIVE HEART, ANGINA PRESENCE UNSPECIFIED, UNSPECIFIED VESSEL OR LESION TYPE: ICD-10-CM

## 2018-08-20 DIAGNOSIS — E11.21 TYPE 2 DIABETES MELLITUS WITH DIABETIC NEPHROPATHY (HCC): ICD-10-CM

## 2018-08-20 DIAGNOSIS — M1A.0710 IDIOPATHIC CHRONIC GOUT OF RIGHT FOOT WITHOUT TOPHUS: ICD-10-CM

## 2018-08-20 RX ORDER — ALLOPURINOL 100 MG/1
100 TABLET ORAL DAILY
Qty: 90 TABLET | Refills: 0 | Status: SHIPPED | OUTPATIENT
Start: 2018-08-20 | End: 2018-11-16 | Stop reason: SDUPTHER

## 2018-08-20 RX ORDER — ATENOLOL 25 MG/1
25 TABLET ORAL DAILY
Qty: 90 TABLET | Refills: 0 | Status: SHIPPED | OUTPATIENT
Start: 2018-08-20 | End: 2018-11-16 | Stop reason: SDUPTHER

## 2018-08-20 RX ORDER — ATORVASTATIN CALCIUM 40 MG/1
40 TABLET, FILM COATED ORAL DAILY
Qty: 90 TABLET | Refills: 0 | Status: SHIPPED | OUTPATIENT
Start: 2018-08-20 | End: 2018-11-16 | Stop reason: SDUPTHER

## 2018-08-20 NOTE — TELEPHONE ENCOUNTER
Ventral hernia     Obese     Chronic back pain sees Dr Reynaldo Bhagat     DDD (degenerative disc disease), lumbar     Diabetic nephropathy with proteinuria ([facility] [address]     Type 2 diabetes with nephropathy (HCC)     Mitral regurgitation     Hx of CABG     Macrocytosis without anemia     Coronary artery disease involving native coronary artery of native heart without angina pectoris     Ear lesion     Chondrodermatitis nodularis chronica helicis     Actinic keratosis     Neoplasm of uncertain behavior of skin     CHF (congestive heart failure) (HCC)     CAD (coronary artery disease)     Obesity (BMI 30-39. 9)     History of congestive heart failure     Central sleep apnea

## 2018-08-20 NOTE — TELEPHONE ENCOUNTER
Pharmacy requests refill of atenolol, lipitor, and allopurinol, last office visit 1-5-18, approve or deny.   Health Maintenance   Topic Date Due    DTaP/Tdap/Td vaccine (1 - Tdap) 11/25/1963    Shingles Vaccine (1 of 2 - 2 Dose Series) 11/25/1994    Diabetic foot exam  02/01/2018    Pneumococcal low/med risk (2 of 2 - PPSV23) 02/01/2018    Diabetic retinal exam  10/16/2018 (Originally 4/12/2017)    Flu vaccine (1) 09/01/2018    A1C test (Diabetic or Prediabetic)  10/16/2018    Lipid screen  08/01/2019    Potassium monitoring  08/01/2019    Creatinine monitoring  08/01/2019    Colon cancer screen colonoscopy  05/13/2021             (applicable per patient's age: Cancer Screenings, Depression Screening, Fall Risk Screening, Immunizations)    Hemoglobin A1C (%)   Date Value   10/16/2017 6.2   06/06/2017 12.2   02/01/2017 7.9     LDL Cholesterol (mg/dL)   Date Value   08/01/2018 61     LDL Calculated (mg/dL)   Date Value   11/18/2015 74     AST (U/L)   Date Value   08/01/2018 16     ALT (U/L)   Date Value   08/01/2018 11     BUN (mg/dL)   Date Value   08/01/2018 27 (H)      (goal A1C is < 7)   (goal LDL is <100) need 30-50% reduction from baseline     BP Readings from Last 3 Encounters:   06/12/18 102/60   04/19/18 112/75   04/12/18 113/73    (goal /80)      All Future Testing planned in [facility]:  Lab Frequency Next Occurrence   Lipid, Fasting Once 11/15/2017   Lipid Panel Once 10/13/2018   CBC Once 10/13/2018   Comprehensive Metabolic Panel Once 15/82/7873   Hemoglobin A1C Once 10/13/2018       Next Visit Date:  Future Appointments  Date Time Provider Diandra Horton   8/22/2018 8:20 AM JOSE Medina - CNP [facility]   9/18/2018 8:30 AM Justin Morales MD [address] [place] [address]            Patient Active Problem List:     AAA (abdominal aortic aneurysm) ([address]     Hypertension     Hyperlipidemia     OA (osteoarthritis) of hip     Colon polyps     Gout     GERD (gastroesophageal reflux

## 2018-08-22 ENCOUNTER — CARE COORDINATION (OUTPATIENT)
Dept: CARE COORDINATION | Age: 73
End: 2018-08-22

## 2018-08-22 ENCOUNTER — OFFICE VISIT (OUTPATIENT)
Dept: PRIMARY CARE CLINIC | Age: 73
End: 2018-08-22
Payer: MEDICARE

## 2018-08-22 VITALS
HEART RATE: 59 BPM | DIASTOLIC BLOOD PRESSURE: 68 MMHG | WEIGHT: 232 LBS | OXYGEN SATURATION: 98 % | RESPIRATION RATE: 16 BRPM | HEIGHT: 71 IN | BODY MASS INDEX: 32.48 KG/M2 | SYSTOLIC BLOOD PRESSURE: 102 MMHG

## 2018-08-22 DIAGNOSIS — E11.21 DIABETIC NEPHROPATHY WITH PROTEINURIA (HCC): ICD-10-CM

## 2018-08-22 DIAGNOSIS — I50.9 CONGESTIVE HEART FAILURE, UNSPECIFIED HF CHRONICITY, UNSPECIFIED HEART FAILURE TYPE (HCC): ICD-10-CM

## 2018-08-22 DIAGNOSIS — D64.9 ANEMIA, UNSPECIFIED TYPE: ICD-10-CM

## 2018-08-22 DIAGNOSIS — I71.40 ABDOMINAL AORTIC ANEURYSM (AAA) WITHOUT RUPTURE: ICD-10-CM

## 2018-08-22 DIAGNOSIS — E11.21 TYPE 2 DIABETES WITH NEPHROPATHY (HCC): Primary | ICD-10-CM

## 2018-08-22 DIAGNOSIS — R79.9 ELEVATED BUN: ICD-10-CM

## 2018-08-22 DIAGNOSIS — Z23 NEED FOR PNEUMOCOCCAL VACCINE: ICD-10-CM

## 2018-08-22 LAB — HBA1C MFR BLD: 6.5 %

## 2018-08-22 PROCEDURE — 83036 HEMOGLOBIN GLYCOSYLATED A1C: CPT | Performed by: NURSE PRACTITIONER

## 2018-08-22 PROCEDURE — 99214 OFFICE O/P EST MOD 30 MIN: CPT | Performed by: NURSE PRACTITIONER

## 2018-08-22 PROCEDURE — G8417 CALC BMI ABV UP PARAM F/U: HCPCS | Performed by: NURSE PRACTITIONER

## 2018-08-22 PROCEDURE — 3017F COLORECTAL CA SCREEN DOC REV: CPT | Performed by: NURSE PRACTITIONER

## 2018-08-22 PROCEDURE — 4004F PT TOBACCO SCREEN RCVD TLK: CPT | Performed by: NURSE PRACTITIONER

## 2018-08-22 PROCEDURE — 2022F DILAT RTA XM EVC RTNOPTHY: CPT | Performed by: NURSE PRACTITIONER

## 2018-08-22 PROCEDURE — 1101F PT FALLS ASSESS-DOCD LE1/YR: CPT | Performed by: NURSE PRACTITIONER

## 2018-08-22 PROCEDURE — G0009 ADMIN PNEUMOCOCCAL VACCINE: HCPCS | Performed by: NURSE PRACTITIONER

## 2018-08-22 PROCEDURE — 90732 PPSV23 VACC 2 YRS+ SUBQ/IM: CPT | Performed by: NURSE PRACTITIONER

## 2018-08-22 PROCEDURE — G8598 ASA/ANTIPLAT THER USED: HCPCS | Performed by: NURSE PRACTITIONER

## 2018-08-22 PROCEDURE — G8427 DOCREV CUR MEDS BY ELIG CLIN: HCPCS | Performed by: NURSE PRACTITIONER

## 2018-08-22 PROCEDURE — 1123F ACP DISCUSS/DSCN MKR DOCD: CPT | Performed by: NURSE PRACTITIONER

## 2018-08-22 PROCEDURE — 4040F PNEUMOC VAC/ADMIN/RCVD: CPT | Performed by: NURSE PRACTITIONER

## 2018-08-22 PROCEDURE — 3044F HG A1C LEVEL LT 7.0%: CPT | Performed by: NURSE PRACTITIONER

## 2018-08-22 ASSESSMENT — PATIENT HEALTH QUESTIONNAIRE - PHQ9
SUM OF ALL RESPONSES TO PHQ QUESTIONS 1-9: 0
1. LITTLE INTEREST OR PLEASURE IN DOING THINGS: 0
SUM OF ALL RESPONSES TO PHQ QUESTIONS 1-9: 0
SUM OF ALL RESPONSES TO PHQ9 QUESTIONS 1 & 2: 0
2. FEELING DOWN, DEPRESSED OR HOPELESS: 0

## 2018-08-22 ASSESSMENT — ENCOUNTER SYMPTOMS
COUGH: 0
SHORTNESS OF BREATH: 0
ABDOMINAL PAIN: 0
BACK PAIN: 1

## 2018-08-22 NOTE — PROGRESS NOTES
[address] PRIMARY CARE  [address] [place] [address] [address]  [facility] [address]  Dept: [phone]  Dept Fax: [phone]    Hina Garcia is a 67 y.o. male who presents today for his medical conditions/complaints as noted below. Hina Garcia is c/o of Diabetes (6 month recheck ) and Congestive Heart Failure      HPI:     Presents with wife Tanja Cho for 6 month follow up on DM  Discussed chronic hip and back pain  Causes difficulty sleeping    Following with  [facility] [address] every 3 months for repeat cysto's   Doing well, states he was trying to gain weight  Reviewed recent blood sugars, he is concerned range was , now 100-116  Wants to keep them well controlled    States he had a recent echo completed, EF was 35-40%  \"That explains my fatigue\"    Lesion to left lower calf, states that it varies in size  Is painful at times  Does not want to see derm at this time    Reviewed recent labs  Elevated BUN/Cre will repeat   Low Hg, will check iron studies, was anemic on previous labs as well        Past Medical History:   Diagnosis Date    AAA (abdominal aortic aneurysm) (HCC)     Dr Caryle Martini CAD (coronary artery disease)     Dr Juan Savage Carotid artery stenosis     Central sleep apnea 10/16/2017    CHF (congestive heart failure) ([address]     Colon polyps     Dr Shalonda Huynh DDD (degenerative disc disease), lumbar 11/3/2015    Diabetic nephropathy with proteinuria ([address] 11/20/2015    Full dentures     Upper only    GERD (gastroesophageal reflux disease) 7/10/2014    Gout 2/7/2014    Heart attack ([address] 1984    Hx of CABG 12/9/2015    Hyperlipidemia     Hypertension     On Rx.     Mitral regurgitation 12/9/2015    Obese 2/19/2015    Oropharyngeal dysphagia 7/10/2014    Post laminectomy syndrome 9/24/2014    Tobacco use 11/25/2014    Type 2 diabetes with nephropathy ([address] 11/20/2015    Wears glasses       Past Surgical History:   Procedure Laterality Date    ABDOMINAL mometasone-formoterol (DULERA) 100-5 MCG/ACT inhaler INHALE TWO PUFFS BY MOUTH TWICE A DAY 52 g 0    XIFAXAN 550 MG tablet TAKE ONE TABLET BY MOUTH TWICE A DAY FOR 14 DAYS 28 tablet 0    Glucose Blood (BLOOD GLUCOSE TEST STRIPS) STRP Free Style Lite, Use 3 daily. Insulin Dependent 300 strip 5    amoxicillin (AMOXIL) 500 MG capsule Take 1 capsule by mouth 4 times daily      isosorbide mononitrate (IMDUR) 60 MG extended release tablet TAKE ONE TABLET BY MOUTH DAILY 90 tablet 1    rifaximin (XIFAXAN) 550 MG tablet TAKE ONE TABLET BY MOUTH TWICE A DAY FOR 14 DAYS 28 tablet 0    ondansetron (ZOFRAN) 8 MG tablet Take 1 tablet by mouth every 12 hours as needed for Nausea or Vomiting 60 tablet 2    lansoprazole (PREVACID) 30 MG delayed release capsule Take 1 capsule by mouth daily 90 capsule 1    furosemide (LASIX) 80 MG tablet TAKE ONE TABLET BY MOUTH DAILY 90 tablet 3    captopril (CAPOTEN) 25 MG tablet TAKE ONE TABLET BY MOUTH THREE TIMES A  tablet 1    potassium chloride (KLOR-CON M20) 20 MEQ extended release tablet Take 1 tablet by mouth daily 90 tablet 3    RANEXA 500 MG extended release tablet TAKE ONE TABLET BY MOUTH TWICE A  tablet 2    Magnesium Oxide 500 MG TABS Take 500 mg by mouth daily 90 tablet 2    Blood Glucose Monitoring Suppl ROMY Glucometer- Free Style Lite 1 Device 0    Lancet Device MISC 1 Device by [employer]. (Med.Supl.;Non-Drugs) route 3 times daily To check blood sugars. Free Style Lite 1 Device 0    morphine sulfate ER (MS CONTIN) 30 MG CR tablet Take 30 mg by mouth 3 times daily . No current facility-administered medications for this visit.       Allergies   Allergen Reactions    Baclofen Shortness Of Breath    Methadone Shortness Of Breath    Iodides Nausea And Vomiting       Health Maintenance   Topic Date Due    DTaP/Tdap/Td vaccine (1 - Tdap) 11/25/1963    Low dose CT lung screening  11/25/1999    Shingles Vaccine (1 of 2 - 2 Dose Series) 06/30/2015    Diabetic foot exam  02/01/2018    Diabetic retinal exam  10/16/2018 (Originally 4/12/2017)    Flu vaccine (1) 09/01/2018    Lipid screen  08/01/2019    Potassium monitoring  08/01/2019    Creatinine monitoring  08/01/2019    A1C test (Diabetic or Prediabetic)  08/22/2019    Colon cancer screen colonoscopy  05/13/2021    Pneumococcal low/med risk  Completed       Subjective:      Review of Systems   Constitutional: Positive for fatigue. Negative for chills and fever. HENT: Negative for congestion. Eyes: Negative for visual disturbance. Respiratory: Negative for cough and shortness of breath. Cardiovascular: Negative for chest pain and palpitations. Gastrointestinal: Negative for abdominal pain. Genitourinary: Negative for difficulty urinating and dysuria. Musculoskeletal: Positive for arthralgias and back pain. Neurological: Negative for dizziness and headaches. Psychiatric/Behavioral: Positive for sleep disturbance. Negative for self-injury and suicidal ideas. The patient is not nervous/anxious. Objective:     Physical Exam   Constitutional: He is oriented to person, place, and time. He appears well-developed and well-nourished. HENT:   Head: Normocephalic and atraumatic. Eyes: Pupils are equal, round, and reactive to light. Neck: Normal range of motion. Cardiovascular: Normal rate, regular rhythm and normal heart sounds. Pulmonary/Chest: Effort normal and breath sounds normal.   Abdominal: Soft. Bowel sounds are normal. There is no tenderness. Musculoskeletal: Normal range of motion. Neurological: He is alert and oriented to person, place, and time. Skin: Skin is warm and dry. Psychiatric: He has a normal mood and affect. His behavior is normal. Judgment and thought content normal.   Nursing note and vitals reviewed.     /68   Pulse 59   Resp 16   Ht 5' 11\" (1.803 m)   Wt 232 lb (105.2 kg)   SpO2 98%   BMI 32.36 kg/m²     Assessment:       Diagnosis Orders   1. Type 2 diabetes with nephropathy (HCC)  POCT glycosylated hemoglobin (Hb A1C)   2. Need for pneumococcal vaccine  Pneumococcal polysaccharide vaccine 23-valent greater than or equal to 3yo subcutaneous/IM   3. Anemia, unspecified type  Iron And TIBC   4. Elevated BUN  Comprehensive Metabolic Panel   5. Abdominal aortic aneurysm (AAA) without rupture ([facility] [address]     6. Diabetic nephropathy with proteinuria ([facility] [address]     7. Congestive heart failure, unspecified HF chronicity, unspecified heart failure type ([facility]         Plan:      Return in about 6 months (around 2/22/2019) for Diabetes. 1. DM- Hga1c from 6.2 to 6.5. Rx increase in metformin to 1000mg BID. Follow up in six months for recheck. 2. Anemia/elevated BUN/Cre- Rx given for labs, follow up pending results. Of the 25 minute duration appointment visit, Brendan POST-BC spent at least 50% of the face-to-face time in counseling, explanation of diagnosis, planning of further management, and answering all questions. Orders Placed This Encounter   Procedures    Pneumococcal polysaccharide vaccine 23-valent greater than or equal to 3yo subcutaneous/IM    Iron And TIBC     Standing Status:   Future     Standing Expiration Date:   8/22/2019     Order Specific Question:   Is Patient Fasting? Answer:   no     Order Specific Question:   No of Hours? Answer:   na    Comprehensive Metabolic Panel     Standing Status:   Future     Standing Expiration Date:   8/23/2019    POCT glycosylated hemoglobin (Hb A1C)     Orders Placed This Encounter   Medications    metFORMIN (GLUCOPHAGE) 500 MG tablet     Sig: Take 2 tablets by mouth 2 times daily (with meals)     Dispense:  360 tablet     Refill:  0       Patient given educational materials - see patient instructions. Discussed use, benefit, and side effects of prescribed medications. All patient questions answered. Pt voiced understanding. Reviewed health maintenance.   Instructed to continue current medications, diet and exercise. Patient agreed with treatment plan. Follow up as directed.       Electronically signed by JOSE Lagunas CNP on 8/22/2018 at 9:57 AM

## 2018-08-27 NOTE — CARE COORDINATION
Ambulatory Care Coordination Note  8/27/2018  CM Risk Score: 3  [place] Mortality Risk Score: 7.07    ACC: TIAGO BHAKTA RN     POC:    1. Did patient get labs drawn  2. How are blood sugars, is he tolerating increase in Metformin ok? Summary Note:  Reviewed recent office visit 8/22/18 pt reported : fatigue, anemia, low iron, need repeat studies, blood sugars wants tighter control , Rx Metformin increase 1000mg BID, I discussed all of this with the patient,    IBS acting up started taking Xifaxin Saturday for 14 days    Pt has not had labs drawn, encouraged to do so , reviewed BS trends encouraged to call and report anything lower than 60. Diabetes Assessment    Medic Alert ID:  No  Meal Planning:  Plate Method, Avoidance of concentrated sweets, Carb counting   How often do you test your blood sugar?:  Daily, Meals, Bedtime   Do you have barriers with adherence to non-pharmacologic self-management interventions? (Nutrition/Exercise/Self-Monitoring):  No   Have you ever had to go to the ED for symptoms of low blood sugar?:  No       Do you have hyperglycemia symptoms?:  No   Do you have hypoglycemia symptoms?:  No   Last Blood Sugar Value:  117   Blood Sugar Monitoring Regimen:  Once a Day       and   Congestive Heart Failure Assessment    Are you currently restricting fluids?:  No Restriction  Do you understand a low sodium diet?:  Yes  Do you understand how to read food labels?:  Yes  How many restaurant meals do you eat per week?:  1-2  Do you salt your food before tasting it?:  No         Symptoms:   CHF associated fatigue: Pos      Symptom course:  no change         Care Coordination Interventions    Program Enrollment:  Rising Risk  Referral from Primary Care Provider:  Yes  Suggested Interventions and Community Resources         Goals Addressed     None          Prior to Admission medications    Medication Sig Start Date End Date Taking?  Authorizing Provider   metFORMIN (GLUCOPHAGE) 500 MG tablet Take 2 tablets by mouth 2 times daily (with meals) 8/22/18   JOSE Corona CNP   atenolol (TENORMIN) 25 MG tablet Take 1 tablet by mouth daily TAKE ONE TABLET BY MOUTH DAILY 8/20/18   JOSE Corona CNP   atorvastatin (LIPITOR) 40 MG tablet Take 1 tablet by mouth daily 8/20/18   JOSE Corona CNP   allopurinol (ZYLOPRIM) 100 MG tablet Take 1 tablet by mouth daily TAKE ONE TABLET BY MOUTH DAILY 8/20/18   JOSE Corona CNP   Lancets MISC Free Style Lite, Use 3 daily. Insulin Dependent 7/24/18   JOSE Sarmiento CNP   mometasone-formoterol (DULERA) 100-5 MCG/ACT inhaler INHALE TWO PUFFS BY MOUTH TWICE A DAY 7/3/18   JOSE Corona CNP   XIFAXAN 550 MG tablet TAKE ONE TABLET BY MOUTH TWICE A DAY FOR 14 DAYS 6/25/18   JOSE Corona CNP   Glucose Blood (BLOOD GLUCOSE TEST STRIPS) STRP Free Style Lite, Use 3 daily.  Insulin Dependent 6/25/18   JOSE Sarmiento CNP   amoxicillin (AMOXIL) 500 MG capsule Take 1 capsule by mouth 4 times daily 6/11/18   Historical Provider, MD   isosorbide mononitrate (IMDUR) 60 MG extended release tablet TAKE ONE TABLET BY MOUTH DAILY 5/30/18   JOSE Corona CNP   ondansetron (ZOFRAN) 8 MG tablet Take 1 tablet by mouth every 12 hours as needed for Nausea or Vomiting 4/9/18   JOSE Corona CNP   lansoprazole (PREVACID) 30 MG delayed release capsule Take 1 capsule by mouth daily 3/26/18   JOSE Corona CNP   furosemide (LASIX) 80 MG tablet TAKE ONE TABLET BY MOUTH DAILY 3/5/18   JOSE Sarmiento CNP   captopril (CAPOTEN) 25 MG tablet TAKE ONE TABLET BY MOUTH THREE TIMES A DAY 2/21/18   JOSE Corona CNP   potassium chloride (KLOR-CON M20) 20 MEQ extended release tablet Take 1 tablet by mouth daily 2/21/18   JOSE Corona CNP   RANEXA 500 MG extended release tablet TAKE ONE TABLET BY MOUTH TWICE A DAY 2/14/18   JOSE Corona -

## 2018-09-04 ENCOUNTER — HOSPITAL ENCOUNTER (OUTPATIENT)
Age: 73
Discharge: HOME | End: 2018-09-04
Payer: MEDICARE

## 2018-09-04 DIAGNOSIS — D50.9 IRON DEFICIENCY ANEMIA, UNSPECIFIED IRON DEFICIENCY ANEMIA TYPE: Primary | ICD-10-CM

## 2018-09-04 DIAGNOSIS — D64.9 ANEMIA, UNSPECIFIED TYPE: ICD-10-CM

## 2018-09-04 DIAGNOSIS — R79.9 ELEVATED BUN: ICD-10-CM

## 2018-09-04 LAB
ALBUMIN SERPL-MCNC: 4 G/DL (ref 3.5–5.2)
ALBUMIN/GLOBULIN RATIO: 1.1 (ref 1–2.5)
ALP BLD-CCNC: 70 U/L (ref 40–129)
ALT SERPL-CCNC: 12 U/L (ref 5–41)
ANION GAP SERPL CALCULATED.3IONS-SCNC: 19 MMOL/L (ref 9–17)
AST SERPL-CCNC: 15 U/L
BILIRUB SERPL-MCNC: 0.32 MG/DL (ref 0.3–1.2)
BUN BLDV-MCNC: 29 MG/DL (ref 8–23)
BUN/CREAT BLD: ABNORMAL (ref 9–20)
CALCIUM SERPL-MCNC: 9.3 MG/DL (ref 8.6–10.4)
CHLORIDE BLD-SCNC: 99 MMOL/L (ref 98–107)
CO2: 23 MMOL/L (ref 20–31)
CREAT SERPL-MCNC: 1.35 MG/DL (ref 0.7–1.2)
GFR AFRICAN AMERICAN: >60 ML/MIN
GFR NON-AFRICAN AMERICAN: 52 ML/MIN
GFR SERPL CREATININE-BSD FRML MDRD: ABNORMAL ML/MIN/{1.73_M2}
GFR SERPL CREATININE-BSD FRML MDRD: ABNORMAL ML/MIN/{1.73_M2}
GLUCOSE BLD-MCNC: 100 MG/DL (ref 70–99)
IRON SATURATION: 21 % (ref 20–55)
IRON: 56 UG/DL (ref 59–158)
POTASSIUM SERPL-SCNC: 4.8 MMOL/L (ref 3.7–5.3)
SODIUM BLD-SCNC: 141 MMOL/L (ref 135–144)
TOTAL IRON BINDING CAPACITY: 261 UG/DL (ref 250–450)
TOTAL PROTEIN: 7.5 G/DL (ref 6.4–8.3)
UNSATURATED IRON BINDING CAPACITY: 205 UG/DL (ref 112–347)

## 2018-09-04 PROCEDURE — 80053 COMPREHEN METABOLIC PANEL: CPT

## 2018-09-04 PROCEDURE — 83550 IRON BINDING TEST: CPT

## 2018-09-04 PROCEDURE — 83540 ASSAY OF IRON: CPT

## 2018-09-04 PROCEDURE — 36415 COLL VENOUS BLD VENIPUNCTURE: CPT

## 2018-09-04 RX ORDER — LANOLIN ALCOHOL/MO/W.PET/CERES
325 CREAM (GRAM) TOPICAL 2 TIMES DAILY
Qty: 90 TABLET | Refills: 3 | Status: SHIPPED | OUTPATIENT
Start: 2018-09-04 | End: 2019-05-14 | Stop reason: ALTCHOICE

## 2018-09-18 ENCOUNTER — HOSPITAL ENCOUNTER (OUTPATIENT)
Age: 73
Setting detail: SPECIMEN
Discharge: HOME | End: 2018-09-18
Payer: MEDICARE

## 2018-09-18 ENCOUNTER — PROCEDURE VISIT (OUTPATIENT)
Dept: UROLOGY | Age: 73
End: 2018-09-18
Payer: MEDICARE

## 2018-09-18 VITALS
WEIGHT: 238 LBS | TEMPERATURE: 98.2 F | DIASTOLIC BLOOD PRESSURE: 64 MMHG | SYSTOLIC BLOOD PRESSURE: 130 MMHG | BODY MASS INDEX: 33.32 KG/M2 | HEART RATE: 65 BPM | HEIGHT: 71 IN

## 2018-09-18 DIAGNOSIS — C67.9 MALIGNANT NEOPLASM OF URINARY BLADDER, UNSPECIFIED SITE (HCC): ICD-10-CM

## 2018-09-18 DIAGNOSIS — C67.9 MALIGNANT NEOPLASM OF URINARY BLADDER, UNSPECIFIED SITE (HCC): Primary | ICD-10-CM

## 2018-09-18 PROCEDURE — 52000 CYSTOURETHROSCOPY: CPT | Performed by: UROLOGY

## 2018-09-18 NOTE — PROGRESS NOTES
Cystoscopy Operative Note (9/18/18)  Surgeon: Rock Jeremy MD  Anesthesia: Urethral 2% Xylocaine   Indications: bladder cancer  Position: Dorsal Lithotomy    Findings:   Risks and Benefits discussed with patient prior to procedure. The patient was prepped and draped in the usual sterile fashion. The flexible cystoscope was advanced through the urethra and into the bladder. The bladder was thoroughly inspected and the following was noted:    Residual Urine: mild  Urethra: normal appearing urethra with no masses, tenderness or lesions  Prostate: partially obstructing lateral lobes of prostate; median lobe present? no.   Bladder: No tumors or CIS noted. No bladder diverticulum. There was mild trabeculation noted. Ureters: Clear efflux from both ureters. Orifices with normal configuration and location. The cystoscope was removed. The patient tolerated the procedure well. One area noted anteriorly looks suspicious for a recurrence vs granuloma. Discussed biopsy, he is a bit reluctant. FISH sent. If positive needs biopsy. If negative needs cysto.

## 2018-09-19 ENCOUNTER — CARE COORDINATION (OUTPATIENT)
Dept: CARE COORDINATION | Age: 73
End: 2018-09-19

## 2018-09-19 NOTE — CARE COORDINATION
Ambulatory Care Coordination Note  9/19/2018  CM Risk Score: 3  [place] Mortality Risk Score: 15.76    ACC: Mercy Bingham, RN    Summary Note: Spoke with wife, patient isn't available to talk. Reports patient has been fine. No issues with fluid weight gain and CHF is fine. Breathing is at baseline. Has meds on hand, taking as directed. No current needs  Encouraged to call with any changes or questions  CC Plan for follow up care coordination needs  How have BS been? IBS? Diabetes Assessment    Medic Alert ID:  No  Meal Planning:  Plate Method, Avoidance of concentrated sweets, Carb counting   How often do you test your blood sugar?:  Daily, Meals, Bedtime   Do you have barriers with adherence to non-pharmacologic self-management interventions? (Nutrition/Exercise/Self-Monitoring):  No   Have you ever had to go to the ED for symptoms of low blood sugar?:  No       No patient-reported symptoms       and   Congestive Heart Failure Assessment    Are you currently restricting fluids?:  No Restriction  Do you understand a low sodium diet?:  Yes  Do you understand how to read food labels?:  Yes  How many restaurant meals do you eat per week?:  1-2  Do you salt your food before tasting it?:  No     No patient-reported symptoms      Symptoms:                     Care Coordination Interventions    Program Enrollment:  Rising Risk  Referral from Primary Care Provider:  Yes  Suggested Interventions and Community Resources         Goals Addressed             Most Recent     Medication Management   On track (9/19/2018)             I will take my medication as directed. I will notify my provider of any problems with medications, like adverse effects or side effects. I will notify my provider/Care Coordinator if I am unable to afford my medications. I will notify my provider for advice before I stop taking any of my medication.     Barriers: none  Plan for overcoming my barriers: I will follow PCP recommendation to reduce my blood sugar, agreed to start insulin  Confidence: 7/10  Anticipated Goal Completion Date: 9-8-17, next A1C            Prior to Admission medications    Medication Sig Start Date End Date Taking? Authorizing Provider   metFORMIN (GLUCOPHAGE) 500 MG tablet TAKE ONE TABLET BY MOUTH THREE TIMES A DAY 9/17/18   JOSE Day CNP   ferrous sulfate (FE TABS) 325 (65 Fe) MG EC tablet Take 1 tablet by mouth 2 times daily  Patient taking differently: Take 325 mg by mouth daily (with breakfast)  9/4/18   JOSE Day CNP   metFORMIN (GLUCOPHAGE) 500 MG tablet Take 2 tablets by mouth 2 times daily (with meals) 8/22/18   JOSE Day CNP   atenolol (TENORMIN) 25 MG tablet Take 1 tablet by mouth daily TAKE ONE TABLET BY MOUTH DAILY 8/20/18   JOSE Day CNP   atorvastatin (LIPITOR) 40 MG tablet Take 1 tablet by mouth daily 8/20/18   JOSE Day CNP   allopurinol (ZYLOPRIM) 100 MG tablet Take 1 tablet by mouth daily TAKE ONE TABLET BY MOUTH DAILY 8/20/18   JOSE Day CNP   Lancets MISC Free Style Lite, Use 3 daily. Insulin Dependent 7/24/18   Andreia Poet, APRN - CNP   mometasone-formoterol (DULERA) 100-5 MCG/ACT inhaler INHALE TWO PUFFS BY MOUTH TWICE A DAY 7/3/18   JOSE Day CNP   XIFAXAN 550 MG tablet TAKE ONE TABLET BY MOUTH TWICE A DAY FOR 14 DAYS 6/25/18   JOSE Day CNP   Glucose Blood (BLOOD GLUCOSE TEST STRIPS) STRP Free Style Lite, Use 3 daily.  Insulin Dependent 6/25/18   Andreia Poet, APRN - CNP   isosorbide mononitrate (IMDUR) 60 MG extended release tablet TAKE ONE TABLET BY MOUTH DAILY 5/30/18   JOSE Day CNP   ondansetron (ZOFRAN) 8 MG tablet Take 1 tablet by mouth every 12 hours as needed for Nausea or Vomiting 4/9/18   JOSE Day CNP   lansoprazole (PREVACID) 30 MG delayed release capsule Take 1 capsule by mouth daily 3/26/18   Angeli Mensah APRN

## 2018-09-20 DIAGNOSIS — K21.9 GASTROESOPHAGEAL REFLUX DISEASE, ESOPHAGITIS PRESENCE NOT SPECIFIED: ICD-10-CM

## 2018-09-20 RX ORDER — LANSOPRAZOLE 30 MG/1
CAPSULE, DELAYED RELEASE ORAL
Qty: 90 CAPSULE | Refills: 0 | Status: SHIPPED | OUTPATIENT
Start: 2018-09-20 | End: 2018-12-20 | Stop reason: SDUPTHER

## 2018-09-26 LAB — UROTHELIAL CANCER DETECTION: NORMAL

## 2018-10-02 DIAGNOSIS — E11.21 DIABETIC NEPHROPATHY WITH PROTEINURIA (HCC): ICD-10-CM

## 2018-10-03 ENCOUNTER — TELEPHONE (OUTPATIENT)
Dept: PRIMARY CARE CLINIC | Age: 73
End: 2018-10-03

## 2018-10-03 NOTE — TELEPHONE ENCOUNTER
DAXA  Patient called, Mazin Marquez called him to let him know Lantus ready for  and patient states he has not been on insulin in over a year and requested rx be cancelled. Writer called [facility] and spoke with Karmen De Jesus and cancelled Lantus.

## 2018-10-10 DIAGNOSIS — I25.10 CORONARY ARTERY DISEASE INVOLVING NATIVE CORONARY ARTERY OF NATIVE HEART, ANGINA PRESENCE UNSPECIFIED: ICD-10-CM

## 2018-10-10 DIAGNOSIS — I10 ESSENTIAL HYPERTENSION: ICD-10-CM

## 2018-10-10 RX ORDER — CAPTOPRIL 25 MG/1
TABLET ORAL
Qty: 270 TABLET | Refills: 0 | Status: SHIPPED | OUTPATIENT
Start: 2018-10-10 | End: 2019-01-07 | Stop reason: SDUPTHER

## 2018-10-11 ENCOUNTER — PROCEDURE VISIT (OUTPATIENT)
Dept: UROLOGY | Age: 73
End: 2018-10-11
Payer: MEDICARE

## 2018-10-11 VITALS — TEMPERATURE: 98.1 F | SYSTOLIC BLOOD PRESSURE: 121 MMHG | DIASTOLIC BLOOD PRESSURE: 72 MMHG | HEART RATE: 60 BPM

## 2018-10-11 DIAGNOSIS — C67.9 MALIGNANT NEOPLASM OF URINARY BLADDER, UNSPECIFIED SITE (HCC): Primary | ICD-10-CM

## 2018-10-11 PROCEDURE — 81003 URINALYSIS AUTO W/O SCOPE: CPT | Performed by: NURSE PRACTITIONER

## 2018-10-11 PROCEDURE — 99999 PR OFFICE/OUTPT VISIT,PROCEDURE ONLY: CPT | Performed by: NURSE PRACTITIONER

## 2018-10-11 PROCEDURE — 51720 TREATMENT OF BLADDER LESION: CPT | Performed by: NURSE PRACTITIONER

## 2018-10-11 ASSESSMENT — ENCOUNTER SYMPTOMS
SHORTNESS OF BREATH: 0
EYE PAIN: 0
WHEEZING: 0
NAUSEA: 0
EYE REDNESS: 0
VOMITING: 0
BACK PAIN: 0
COUGH: 0
COLOR CHANGE: 0
ABDOMINAL PAIN: 0

## 2018-10-12 ENCOUNTER — CARE COORDINATION (OUTPATIENT)
Dept: CARE COORDINATION | Age: 73
End: 2018-10-12

## 2018-10-12 DIAGNOSIS — R11.0 NAUSEA: Primary | ICD-10-CM

## 2018-10-12 RX ORDER — BACLOFEN 20 MG
500 TABLET ORAL DAILY
Qty: 90 TABLET | Refills: 2 | Status: SHIPPED | OUTPATIENT
Start: 2018-10-12 | End: 2020-05-07 | Stop reason: SDUPTHER

## 2018-10-12 RX ORDER — ONDANSETRON 4 MG/1
4 TABLET, FILM COATED ORAL EVERY 8 HOURS PRN
Qty: 30 TABLET | Refills: 3 | Status: SHIPPED | OUTPATIENT
Start: 2018-10-12 | End: 2018-10-18 | Stop reason: SDUPTHER

## 2018-10-16 NOTE — TELEPHONE ENCOUNTER
08/22/2018    Health Maintenance   Topic Date Due    DTaP/Tdap/Td vaccine (1 - Tdap) 11/25/1963    Shingles Vaccine (1 of 2 - 2 Dose Series) 06/30/2015    Diabetic foot exam  02/01/2018    Flu vaccine (1) 09/01/2018    Diabetic retinal exam  10/16/2018 (Originally 4/12/2017)    Lipid screen  08/01/2019    A1C test (Diabetic or Prediabetic)  08/22/2019    Potassium monitoring  09/04/2019    Creatinine monitoring  09/04/2019    Colon cancer screen colonoscopy  05/13/2021    Pneumococcal low/med risk  Completed             (applicable per patient's age: Cancer Screenings, Depression Screening, Fall Risk Screening, Immunizations)    Hemoglobin A1C (%)   Date Value   08/22/2018 6.5   10/16/2017 6.2   06/06/2017 12.2     LDL Cholesterol (mg/dL)   Date Value   08/01/2018 61     LDL Calculated (mg/dL)   Date Value   11/18/2015 74     AST (U/L)   Date Value   09/04/2018 15     ALT (U/L)   Date Value   09/04/2018 12     BUN (mg/dL)   Date Value   09/04/2018 29 (H)      (goal A1C is < 7)   (goal LDL is <100) need 30-50% reduction from baseline     BP Readings from Last 3 Encounters:   10/11/18 121/72   09/18/18 130/64   08/22/18 102/68    (goal /80)      All Future Testing planned in [facility]:  Lab Frequency Next Occurrence   Lipid, Fasting Once 11/15/2017   Lipid Panel Once 10/13/2018   CBC Once 10/13/2018   Comprehensive Metabolic Panel Once 51/92/8593   Hemoglobin A1C Once 10/13/2018       Next Visit Date:  Future Appointments  Date Time Provider Diandra Horton   10/18/2018 12:45 PM SCHEDULE, WENDY Polo   10/25/2018 1:15 PM SCHEDULE, [facility] [place] [facility]   1/4/2019 9:30 AM Attila Salas  [facility]   2/25/2019 8:20 AM JOSE Rojas - CNP [facility]            Patient Active Problem List:     AAA (abdominal aortic aneurysm) (HCC)     Hypertension     Hyperlipidemia     OA (osteoarthritis) of hip     Colon polyps     Gout     GERD

## 2018-10-18 ENCOUNTER — PROCEDURE VISIT (OUTPATIENT)
Dept: UROLOGY | Age: 73
End: 2018-10-18
Payer: MEDICARE

## 2018-10-18 VITALS — SYSTOLIC BLOOD PRESSURE: 105 MMHG | TEMPERATURE: 98 F | HEART RATE: 59 BPM | DIASTOLIC BLOOD PRESSURE: 65 MMHG

## 2018-10-18 DIAGNOSIS — C67.9 MALIGNANT NEOPLASM OF URINARY BLADDER, UNSPECIFIED SITE (HCC): Primary | ICD-10-CM

## 2018-10-18 PROCEDURE — 51720 TREATMENT OF BLADDER LESION: CPT | Performed by: NURSE PRACTITIONER

## 2018-10-18 PROCEDURE — 99999 PR OFFICE/OUTPT VISIT,PROCEDURE ONLY: CPT | Performed by: NURSE PRACTITIONER

## 2018-10-18 PROCEDURE — 81003 URINALYSIS AUTO W/O SCOPE: CPT | Performed by: NURSE PRACTITIONER

## 2018-10-18 NOTE — PROGRESS NOTES
BCG Treatment Note:    10/18/18    Treatment 2 of 3. Risks, benefits, and some potential complications were discussed at length with the patient including infection, bleeding, voiding discomfort, fever, chills, cough, joint pain, and others. All questions were answered. Sterile technique and intraurethral analgesia were used. A clear rubber catheter was gently placed through the urethra and into the patients bladder. The bladder was emptied of all urine. Approximately 50 cc of BCG was instilled into the patient's bladder. The clear rubber catheter was gently removed. The patient was given the following instructions before leaving the office after treatment:  - The patient was instructed not to empty his bladder for at least two hours  - Void while seated to avoid splashing of urine.  - For the 6 hours following the treatment, add an equal amount of bleach to the toilet/urine to disinfect for 15 minutes before flushing.   - Use the same toilet to void in for 24 hours after the BCG, the clean toilet with bleach. - Patient given BCG Post-Treatment Information sheet. The procedure was well-tolerated and without complications. Verbal and written instructions were given to call the office for joint pain, cough,skin rash,fever (temp higher than 101.5 for more than 24 hours, or if your symptoms are intense or last longer than 72 hours. The patient stated that they understood these instructions and would comply with them.           Electronically signed by [facility] Violeta ESQUIVEL on 10/18/2018 at 2:37 PM

## 2018-10-25 ENCOUNTER — PROCEDURE VISIT (OUTPATIENT)
Dept: UROLOGY | Age: 73
End: 2018-10-25
Payer: MEDICARE

## 2018-10-25 VITALS — DIASTOLIC BLOOD PRESSURE: 65 MMHG | SYSTOLIC BLOOD PRESSURE: 104 MMHG | HEART RATE: 66 BPM | TEMPERATURE: 98 F

## 2018-10-25 DIAGNOSIS — C67.9 MALIGNANT NEOPLASM OF URINARY BLADDER, UNSPECIFIED SITE (HCC): Primary | ICD-10-CM

## 2018-10-25 PROCEDURE — 81003 URINALYSIS AUTO W/O SCOPE: CPT | Performed by: NURSE PRACTITIONER

## 2018-10-25 PROCEDURE — 51720 TREATMENT OF BLADDER LESION: CPT | Performed by: NURSE PRACTITIONER

## 2018-10-25 PROCEDURE — 99999 PR OFFICE/OUTPT VISIT,PROCEDURE ONLY: CPT | Performed by: NURSE PRACTITIONER

## 2018-10-25 NOTE — PROGRESS NOTES
BCG Treatment Note:    10/25/18    Treatment 3 of 3. Risks, benefits, and some potential complications were discussed at length with the patient including infection, bleeding, voiding discomfort, fever, chills, cough, joint pain, and others. All questions were answered. Sterile technique and intraurethral analgesia were used. A clear rubber catheter was gently placed through the urethra and into the patients bladder. The bladder was emptied of all urine. Approximately 50 cc of BCG was instilled into the patient's bladder. The clear rubber catheter was gently removed. The patient was given the following instructions before leaving the office after treatment:  - The patient was instructed not to empty his bladder for at least two hours  - Void while seated to avoid splashing of urine.  - For the 6 hours following the treatment, add an equal amount of bleach to the toilet/urine to disinfect for 15 minutes before flushing.   - Use the same toilet to void in for 24 hours after the BCG, the clean toilet with bleach. - Patient given BCG Post-Treatment Information sheet. The procedure was well-tolerated and without complications. Verbal and written instructions were given to call the office for joint pain, cough,skin rash,fever (temp higher than 101.5 for more than 24 hours, or if your symptoms are intense or last longer than 72 hours. The patient stated that they understood these instructions and would comply with them.           Electronically signed by Dionna Betancourt on 10/25/2018 at 1:49 PM

## 2018-10-30 ENCOUNTER — CARE COORDINATION (OUTPATIENT)
Dept: CARE COORDINATION | Age: 73
End: 2018-10-30

## 2018-10-30 NOTE — CARE COORDINATION
[address] [facility], [address], [place] [address] [place] [address]   2/25/2019 8:20 AM JOSE Holly - CNP [facility] Corita Devaughn

## 2018-11-01 DIAGNOSIS — I25.10 CORONARY ARTERY DISEASE INVOLVING NATIVE HEART, ANGINA PRESENCE UNSPECIFIED, UNSPECIFIED VESSEL OR LESION TYPE: ICD-10-CM

## 2018-11-01 RX ORDER — RANOLAZINE 500 MG/1
500 TABLET, EXTENDED RELEASE ORAL 2 TIMES DAILY
Qty: 180 TABLET | Refills: 1 | Status: SHIPPED | OUTPATIENT
Start: 2018-11-01 | End: 2019-05-03 | Stop reason: SDUPTHER

## 2018-11-01 NOTE — TELEPHONE ENCOUNTER
Chronic back pain sees Dr Tasha Cat     DDD (degenerative disc disease), lumbar     Diabetic nephropathy with proteinuria ([address]     Type 2 diabetes with nephropathy ([address]     Mitral regurgitation     Hx of CABG     Macrocytosis without anemia     Coronary artery disease involving native coronary artery of native heart without angina pectoris     Ear lesion     Chondrodermatitis nodularis chronica helicis     Actinic keratosis     Neoplasm of uncertain behavior of skin     CHF (congestive heart failure) (HCC)     CAD (coronary artery disease)     Obesity (BMI 30-39. 9)     History of congestive heart failure     Central sleep apnea

## 2018-11-14 ENCOUNTER — CARE COORDINATION (OUTPATIENT)
Dept: CARE COORDINATION | Age: 73
End: 2018-11-14

## 2018-11-14 NOTE — TELEPHONE ENCOUNTER
Last OV 08/22/2018    Health Maintenance   Topic Date Due    DTaP/Tdap/Td vaccine (1 - Tdap) 11/25/1963    Low dose CT lung screening  11/25/1999    Shingles Vaccine (1 of 2 - 2 Dose Series) 06/30/2015    Diabetic retinal exam  04/12/2017    Diabetic foot exam  02/01/2018    Flu vaccine (1) 09/01/2018    Lipid screen  08/01/2019    A1C test (Diabetic or Prediabetic)  08/22/2019    Potassium monitoring  09/04/2019    Creatinine monitoring  09/04/2019    Colon cancer screen colonoscopy  05/13/2021    Pneumococcal low/med risk  Completed             (applicable per patient's age: Cancer Screenings, Depression Screening, Fall Risk Screening, Immunizations)    Hemoglobin A1C (%)   Date Value   08/22/2018 6.5   10/16/2017 6.2   06/06/2017 12.2     LDL Cholesterol (mg/dL)   Date Value   08/01/2018 61     LDL Calculated (mg/dL)   Date Value   11/18/2015 74     AST (U/L)   Date Value   09/04/2018 15     ALT (U/L)   Date Value   09/04/2018 12     BUN (mg/dL)   Date Value   09/04/2018 29 (H)      (goal A1C is < 7)   (goal LDL is <100) need 30-50% reduction from baseline     BP Readings from Last 3 Encounters:   10/25/18 104/65   10/18/18 105/65   10/11/18 121/72    (goal /80)      All Future Testing planned in [facility]:  Lab Frequency Next Occurrence   Lipid Panel Once 10/13/2018   CBC Once 10/13/2018   Comprehensive Metabolic Panel Once 05/77/6138   Hemoglobin A1C Once 10/13/2018       Next Visit Date:  Future Appointments  Date Time Provider Diandra Horton   1/4/2019 9:30 AM Yecenia Schultz  [facility]   2/25/2019 8:20 AM JOSE Esteban - CNP [facility] Sanon            Patient Active Problem List:     AAA (abdominal aortic aneurysm) ([facility]     Hypertension     Hyperlipidemia     OA (osteoarthritis) of hip     Colon polyps     Gout     GERD (gastroesophageal reflux disease)     Oropharyngeal dysphagia     Post laminectomy syndrome     Tobacco use     Ventral hernia     Obese

## 2018-11-16 DIAGNOSIS — E11.21 TYPE 2 DIABETES MELLITUS WITH DIABETIC NEPHROPATHY (HCC): ICD-10-CM

## 2018-11-16 DIAGNOSIS — I25.10 CORONARY ARTERY DISEASE INVOLVING NATIVE HEART, ANGINA PRESENCE UNSPECIFIED, UNSPECIFIED VESSEL OR LESION TYPE: ICD-10-CM

## 2018-11-16 DIAGNOSIS — M1A.0710 IDIOPATHIC CHRONIC GOUT OF RIGHT FOOT WITHOUT TOPHUS: ICD-10-CM

## 2018-11-16 RX ORDER — ATENOLOL 25 MG/1
TABLET ORAL
Qty: 90 TABLET | Refills: 0 | Status: SHIPPED | OUTPATIENT
Start: 2018-11-16 | End: 2019-02-14 | Stop reason: SDUPTHER

## 2018-11-16 RX ORDER — ATORVASTATIN CALCIUM 40 MG/1
TABLET, FILM COATED ORAL
Qty: 90 TABLET | Refills: 0 | Status: SHIPPED | OUTPATIENT
Start: 2018-11-16 | End: 2019-02-14 | Stop reason: SDUPTHER

## 2018-11-16 RX ORDER — ALLOPURINOL 100 MG/1
TABLET ORAL
Qty: 90 TABLET | Refills: 0 | Status: SHIPPED | OUTPATIENT
Start: 2018-11-16 | End: 2019-02-14 | Stop reason: SDUPTHER

## 2018-11-16 NOTE — TELEPHONE ENCOUNTER
Last ov  8/22/18    Health Maintenance   Topic Date Due    DTaP/Tdap/Td vaccine (1 - Tdap) 11/25/1963    Low dose CT lung screening  11/25/1999    Shingles Vaccine (1 of 2 - 2 Dose Series) 06/30/2015    Diabetic retinal exam  04/12/2017    Diabetic foot exam  02/01/2018    Flu vaccine (1) 09/01/2018    Lipid screen  08/01/2019    A1C test (Diabetic or Prediabetic)  08/22/2019    Potassium monitoring  09/04/2019    Creatinine monitoring  09/04/2019    Colon cancer screen colonoscopy  05/13/2021    Pneumococcal low/med risk  Completed             (applicable per patient's age: Cancer Screenings, Depression Screening, Fall Risk Screening, Immunizations)    Hemoglobin A1C (%)   Date Value   08/22/2018 6.5   10/16/2017 6.2   06/06/2017 12.2     LDL Cholesterol (mg/dL)   Date Value   08/01/2018 61     LDL Calculated (mg/dL)   Date Value   11/18/2015 74     AST (U/L)   Date Value   09/04/2018 15     ALT (U/L)   Date Value   09/04/2018 12     BUN (mg/dL)   Date Value   09/04/2018 29 (H)      (goal A1C is < 7)   (goal LDL is <100) need 30-50% reduction from baseline     BP Readings from Last 3 Encounters:   10/25/18 104/65   10/18/18 105/65   10/11/18 121/72    (goal /80)      All Future Testing planned in [facility]:  Lab Frequency Next Occurrence   Lipid Panel Once 10/13/2018   CBC Once 10/13/2018   Comprehensive Metabolic Panel Once 96/35/7246   Hemoglobin A1C Once 10/13/2018       Next Visit Date:  Future Appointments  Date Time Provider Diandra Horton   1/4/2019 9:30 AM Briana Ott  [facility]   2/25/2019 8:20 AM JOSE Lopez - CNP [facility]            Patient Active Problem List:     AAA (abdominal aortic aneurysm) ([facility]     Hypertension     Hyperlipidemia     OA (osteoarthritis) of hip     Colon polyps     Gout     GERD (gastroesophageal reflux disease)     Oropharyngeal dysphagia     Post laminectomy syndrome     Tobacco use     Ventral hernia     Obese     Chronic back pain sees Dr Ricardo Yun     DDD (degenerative disc disease), lumbar     Diabetic nephropathy with proteinuria ([address]     Type 2 diabetes with nephropathy ([address]     Mitral regurgitation     Hx of CABG     Macrocytosis without anemia     Coronary artery disease involving native coronary artery of native heart without angina pectoris     Ear lesion     Chondrodermatitis nodularis chronica helicis     Actinic keratosis     Neoplasm of uncertain behavior of skin     CHF (congestive heart failure) (HCC)     CAD (coronary artery disease)     Obesity (BMI 30-39. 9)     History of congestive heart failure     Central sleep apnea

## 2018-11-27 RX ORDER — ISOSORBIDE MONONITRATE 60 MG/1
TABLET, EXTENDED RELEASE ORAL
Qty: 90 TABLET | Refills: 1 | Status: SHIPPED | OUTPATIENT
Start: 2018-11-27 | End: 2019-05-30 | Stop reason: SDUPTHER

## 2018-12-14 ENCOUNTER — CARE COORDINATION (OUTPATIENT)
Dept: CARE COORDINATION | Age: 73
End: 2018-12-14

## 2018-12-14 NOTE — CARE COORDINATION
Admission medications    Medication Sig Start Date End Date Taking? Authorizing Provider   metFORMIN (GLUCOPHAGE) 500 MG tablet TAKE ONE TABLET BY MOUTH THREE TIMES A DAY 12/13/18   JOSE Chavez CNP   isosorbide mononitrate (IMDUR) 60 MG extended release tablet TAKE ONE TABLET BY MOUTH DAILY 11/27/18   JOSE Flynn CNP   atorvastatin (LIPITOR) 40 MG tablet TAKE ONE TABLET BY MOUTH DAILY 11/16/18   JOSE Chavez CNP   allopurinol (ZYLOPRIM) 100 MG tablet TAKE ONE TABLET BY MOUTH DAILY 11/16/18   JOSE Chavez CNP   atenolol (TENORMIN) 25 MG tablet TAKE ONE TABLET BY MOUTH DAILY 11/16/18   JOSE Chavez CNP   ranolazine (RANEXA) 500 MG extended release tablet Take 1 tablet by mouth 2 times daily 11/1/18   JOSE Chavez CNP   Magnesium Oxide 500 MG TABS Take 500 mg by mouth daily 10/12/18   Helen Mcdaniels MD   captopril (CAPOTEN) 25 MG tablet TAKE ONE TABLET BY MOUTH THREE TIMES A DAY 10/10/18   JOSE Chavez CNP   insulin glargine (LANTUS SOLOSTAR) 100 UNIT/ML injection pen Inject 10 Units into the skin nightly 10/2/18   JOSE Chavez CNP   lansoprazole (PREVACID) 30 MG delayed release capsule TAKE ONE CAPSULE BY MOUTH DAILY 9/20/18   JOSE Chavez CNP   ferrous sulfate (FE TABS) 325 (65 Fe) MG EC tablet Take 1 tablet by mouth 2 times daily  Patient taking differently: Take 325 mg by mouth daily (with breakfast)  9/4/18   JOSE Chavez CNP   metFORMIN (GLUCOPHAGE) 500 MG tablet Take 2 tablets by mouth 2 times daily (with meals) 8/22/18   JOSE Chavez CNP   Lancets MISC Free Style Lite, Use 3 daily. Insulin Dependent 7/24/18   JOSE Flynn CNP   mometasone-formoterol (DULERA) 100-5 MCG/ACT inhaler INHALE TWO PUFFS BY MOUTH TWICE A DAY 7/3/18   JOSE Chavez CNP   Glucose Blood (BLOOD GLUCOSE TEST STRIPS) STRP Free Style Lite, Use 3 daily.  Insulin Dependent 6/25/18   JOSE Wallace CNP   ondansetron (ZOFRAN) 8 MG tablet Take 1 tablet by mouth every 12 hours as needed for Nausea or Vomiting 4/9/18   JOSE Clark CNP   furosemide (LASIX) 80 MG tablet TAKE ONE TABLET BY MOUTH DAILY 3/5/18   JOSE Wallace CNP   potassium chloride (KLOR-CON M20) 20 MEQ extended release tablet Take 1 tablet by mouth daily 2/21/18   JOSE Clark CNP   Blood Glucose Monitoring Suppl ROMY Glucometer- Free Style Lite 6/12/17   Blanka Hill DO   Lancet Device MISC 1 Device by Miscell. (Med.Supl.;Non-Drugs) route 3 times daily To check blood sugars. Free Style Lite 6/12/17   Blanka Hill DO   morphine sulfate ER (MS CONTIN) 30 MG CR tablet Take 30 mg by mouth 3 times daily .  3/23/16   Historical Provider, MD       Future Appointments  Date Time Provider Diandra Horton   1/4/2019 9:30 AM Gilles Leach MD [address] [place] [address]   2/25/2019 8:20 AM JOSE Clark CNP [facility] Cinda Hartmann

## 2018-12-20 DIAGNOSIS — K21.9 GASTROESOPHAGEAL REFLUX DISEASE, ESOPHAGITIS PRESENCE NOT SPECIFIED: ICD-10-CM

## 2018-12-20 RX ORDER — LANSOPRAZOLE 30 MG/1
CAPSULE, DELAYED RELEASE ORAL
Qty: 90 CAPSULE | Refills: 0 | Status: SHIPPED | OUTPATIENT
Start: 2018-12-20 | End: 2019-03-18 | Stop reason: SDUPTHER

## 2018-12-26 DIAGNOSIS — J42 CHRONIC BRONCHITIS, UNSPECIFIED CHRONIC BRONCHITIS TYPE (HCC): ICD-10-CM

## 2019-01-04 ENCOUNTER — PROCEDURE VISIT (OUTPATIENT)
Dept: UROLOGY | Age: 74
End: 2019-01-04
Payer: MEDICARE

## 2019-01-04 ENCOUNTER — HOSPITAL ENCOUNTER (OUTPATIENT)
Age: 74
Setting detail: SPECIMEN
Discharge: HOME | End: 2019-01-04
Payer: MEDICARE

## 2019-01-04 VITALS
BODY MASS INDEX: 33.46 KG/M2 | SYSTOLIC BLOOD PRESSURE: 128 MMHG | HEART RATE: 67 BPM | DIASTOLIC BLOOD PRESSURE: 72 MMHG | TEMPERATURE: 97.8 F | HEIGHT: 71 IN | WEIGHT: 239 LBS

## 2019-01-04 DIAGNOSIS — C67.9 MALIGNANT NEOPLASM OF URINARY BLADDER, UNSPECIFIED SITE (HCC): ICD-10-CM

## 2019-01-04 DIAGNOSIS — C67.9 MALIGNANT NEOPLASM OF URINARY BLADDER, UNSPECIFIED SITE (HCC): Primary | ICD-10-CM

## 2019-01-04 PROCEDURE — 99999 PR OFFICE/OUTPT VISIT,PROCEDURE ONLY: CPT | Performed by: UROLOGY

## 2019-01-04 PROCEDURE — 52000 CYSTOURETHROSCOPY: CPT | Performed by: UROLOGY

## 2019-01-07 DIAGNOSIS — I25.10 CORONARY ARTERY DISEASE INVOLVING NATIVE CORONARY ARTERY OF NATIVE HEART, ANGINA PRESENCE UNSPECIFIED: ICD-10-CM

## 2019-01-07 DIAGNOSIS — I10 ESSENTIAL HYPERTENSION: ICD-10-CM

## 2019-01-07 RX ORDER — CAPTOPRIL 25 MG/1
TABLET ORAL
Qty: 270 TABLET | Refills: 0 | Status: SHIPPED | OUTPATIENT
Start: 2019-01-07 | End: 2019-03-24 | Stop reason: SDUPTHER

## 2019-01-15 ENCOUNTER — CARE COORDINATION (OUTPATIENT)
Dept: CARE COORDINATION | Age: 74
End: 2019-01-15

## 2019-01-15 LAB — UROTHELIAL CANCER DETECTION: NORMAL

## 2019-01-23 ENCOUNTER — CARE COORDINATION (OUTPATIENT)
Dept: CARE COORDINATION | Age: 74
End: 2019-01-23

## 2019-02-18 ENCOUNTER — CARE COORDINATION (OUTPATIENT)
Dept: CARE COORDINATION | Age: 74
End: 2019-02-18

## 2019-02-25 ENCOUNTER — CARE COORDINATION (OUTPATIENT)
Dept: CARE COORDINATION | Age: 74
End: 2019-02-25

## 2019-02-25 ENCOUNTER — OFFICE VISIT (OUTPATIENT)
Dept: PRIMARY CARE CLINIC | Age: 74
End: 2019-02-25
Payer: MEDICARE

## 2019-02-25 VITALS
BODY MASS INDEX: 31.67 KG/M2 | HEIGHT: 71 IN | WEIGHT: 226.2 LBS | DIASTOLIC BLOOD PRESSURE: 84 MMHG | HEART RATE: 59 BPM | OXYGEN SATURATION: 91 % | SYSTOLIC BLOOD PRESSURE: 116 MMHG | RESPIRATION RATE: 16 BRPM

## 2019-02-25 DIAGNOSIS — E61.1 IRON DEFICIENCY: ICD-10-CM

## 2019-02-25 DIAGNOSIS — F51.01 PRIMARY INSOMNIA: ICD-10-CM

## 2019-02-25 DIAGNOSIS — E78.5 HYPERLIPIDEMIA, UNSPECIFIED HYPERLIPIDEMIA TYPE: ICD-10-CM

## 2019-02-25 DIAGNOSIS — I71.40 ABDOMINAL AORTIC ANEURYSM (AAA) WITHOUT RUPTURE: Primary | ICD-10-CM

## 2019-02-25 DIAGNOSIS — I50.9 CHRONIC HEART FAILURE, UNSPECIFIED HEART FAILURE TYPE (HCC): ICD-10-CM

## 2019-02-25 DIAGNOSIS — M16.0 PRIMARY OSTEOARTHRITIS OF BOTH HIPS: ICD-10-CM

## 2019-02-25 DIAGNOSIS — E11.21 TYPE 2 DIABETES WITH NEPHROPATHY (HCC): ICD-10-CM

## 2019-02-25 DIAGNOSIS — I71.40 ABDOMINAL AORTIC ANEURYSM (AAA) WITHOUT RUPTURE: ICD-10-CM

## 2019-02-25 DIAGNOSIS — Z13.0 SCREENING FOR DEFICIENCY ANEMIA: ICD-10-CM

## 2019-02-25 DIAGNOSIS — Z13.29 SCREENING FOR THYROID DISORDER: ICD-10-CM

## 2019-02-25 DIAGNOSIS — E11.21 DIABETIC NEPHROPATHY WITH PROTEINURIA (HCC): Primary | ICD-10-CM

## 2019-02-25 DIAGNOSIS — I10 ESSENTIAL HYPERTENSION: ICD-10-CM

## 2019-02-25 LAB — HBA1C MFR BLD: 6.3 %

## 2019-02-25 PROCEDURE — 99214 OFFICE O/P EST MOD 30 MIN: CPT | Performed by: NURSE PRACTITIONER

## 2019-02-25 PROCEDURE — G8427 DOCREV CUR MEDS BY ELIG CLIN: HCPCS | Performed by: NURSE PRACTITIONER

## 2019-02-25 PROCEDURE — 3017F COLORECTAL CA SCREEN DOC REV: CPT | Performed by: NURSE PRACTITIONER

## 2019-02-25 PROCEDURE — 4004F PT TOBACCO SCREEN RCVD TLK: CPT | Performed by: NURSE PRACTITIONER

## 2019-02-25 PROCEDURE — G8599 NO ASA/ANTIPLAT THER USE RNG: HCPCS | Performed by: NURSE PRACTITIONER

## 2019-02-25 PROCEDURE — G8417 CALC BMI ABV UP PARAM F/U: HCPCS | Performed by: NURSE PRACTITIONER

## 2019-02-25 PROCEDURE — 1101F PT FALLS ASSESS-DOCD LE1/YR: CPT | Performed by: NURSE PRACTITIONER

## 2019-02-25 PROCEDURE — 4040F PNEUMOC VAC/ADMIN/RCVD: CPT | Performed by: NURSE PRACTITIONER

## 2019-02-25 PROCEDURE — 1123F ACP DISCUSS/DSCN MKR DOCD: CPT | Performed by: NURSE PRACTITIONER

## 2019-02-25 PROCEDURE — 2022F DILAT RTA XM EVC RTNOPTHY: CPT | Performed by: NURSE PRACTITIONER

## 2019-02-25 PROCEDURE — 83036 HEMOGLOBIN GLYCOSYLATED A1C: CPT | Performed by: NURSE PRACTITIONER

## 2019-02-25 PROCEDURE — G8484 FLU IMMUNIZE NO ADMIN: HCPCS | Performed by: NURSE PRACTITIONER

## 2019-02-25 PROCEDURE — 3044F HG A1C LEVEL LT 7.0%: CPT | Performed by: NURSE PRACTITIONER

## 2019-02-25 RX ORDER — OXYMORPHONE HYDROCHLORIDE 20 MG/1
20 TABLET, FILM COATED, EXTENDED RELEASE ORAL 2 TIMES DAILY
COMMUNITY
Start: 2019-02-21 | End: 2019-04-09 | Stop reason: SINTOL

## 2019-02-25 RX ORDER — ACETAMINOPHEN,DIPHENHYDRAMINE HCL 500; 25 MG/1; MG/1
1 TABLET, FILM COATED ORAL NIGHTLY PRN
Qty: 14 TABLET | Refills: 0 | Status: SHIPPED | OUTPATIENT
Start: 2019-02-25 | End: 2019-08-12

## 2019-02-25 ASSESSMENT — PATIENT HEALTH QUESTIONNAIRE - PHQ9
SUM OF ALL RESPONSES TO PHQ9 QUESTIONS 1 & 2: 0
1. LITTLE INTEREST OR PLEASURE IN DOING THINGS: 0
2. FEELING DOWN, DEPRESSED OR HOPELESS: 0
SUM OF ALL RESPONSES TO PHQ QUESTIONS 1-9: 0
SUM OF ALL RESPONSES TO PHQ QUESTIONS 1-9: 0

## 2019-02-25 ASSESSMENT — ENCOUNTER SYMPTOMS
ABDOMINAL PAIN: 0
DIARRHEA: 1
SHORTNESS OF BREATH: 0
BACK PAIN: 0
COUGH: 0

## 2019-02-28 ENCOUNTER — HOSPITAL ENCOUNTER (OUTPATIENT)
Dept: GENERAL RADIOLOGY | Age: 74
Discharge: HOME | End: 2019-03-02
Payer: MEDICARE

## 2019-02-28 ENCOUNTER — HOSPITAL ENCOUNTER (OUTPATIENT)
Age: 74
Discharge: HOME | End: 2019-03-02
Payer: MEDICARE

## 2019-02-28 DIAGNOSIS — M16.0 PRIMARY OSTEOARTHRITIS OF BOTH HIPS: ICD-10-CM

## 2019-02-28 PROCEDURE — 73502 X-RAY EXAM HIP UNI 2-3 VIEWS: CPT

## 2019-03-01 ENCOUNTER — HOSPITAL ENCOUNTER (OUTPATIENT)
Dept: VASCULAR LAB | Facility: CLINIC | Age: 74
Discharge: HOME | End: 2019-03-01
Payer: MEDICARE

## 2019-03-01 DIAGNOSIS — I71.40 ABDOMINAL AORTIC ANEURYSM (AAA) WITHOUT RUPTURE: ICD-10-CM

## 2019-03-01 PROCEDURE — 93978 VASCULAR STUDY: CPT

## 2019-03-18 DIAGNOSIS — K21.9 GASTROESOPHAGEAL REFLUX DISEASE, ESOPHAGITIS PRESENCE NOT SPECIFIED: ICD-10-CM

## 2019-03-18 RX ORDER — LANSOPRAZOLE 30 MG/1
CAPSULE, DELAYED RELEASE ORAL
Qty: 90 CAPSULE | Refills: 3 | Status: SHIPPED | OUTPATIENT
Start: 2019-03-18 | End: 2020-03-09

## 2019-03-21 ENCOUNTER — CARE COORDINATION (OUTPATIENT)
Dept: CARE COORDINATION | Age: 74
End: 2019-03-21

## 2019-04-09 ENCOUNTER — HOSPITAL ENCOUNTER (OUTPATIENT)
Age: 74
Setting detail: SPECIMEN
Discharge: HOME | End: 2019-04-09
Payer: MEDICARE

## 2019-04-09 ENCOUNTER — PROCEDURE VISIT (OUTPATIENT)
Dept: UROLOGY | Age: 74
End: 2019-04-09
Payer: MEDICARE

## 2019-04-09 VITALS
WEIGHT: 235 LBS | TEMPERATURE: 97.7 F | BODY MASS INDEX: 32.9 KG/M2 | DIASTOLIC BLOOD PRESSURE: 70 MMHG | SYSTOLIC BLOOD PRESSURE: 110 MMHG | HEIGHT: 71 IN

## 2019-04-09 DIAGNOSIS — C67.8 MALIGNANT NEOPLASM OF OVERLAPPING SITES OF BLADDER (HCC): Primary | ICD-10-CM

## 2019-04-09 DIAGNOSIS — C67.8 MALIGNANT NEOPLASM OF OVERLAPPING SITES OF BLADDER (HCC): ICD-10-CM

## 2019-04-09 PROCEDURE — 52000 CYSTOURETHROSCOPY: CPT | Performed by: UROLOGY

## 2019-04-09 RX ORDER — MORPHINE SULFATE 30 MG/1
30 TABLET, FILM COATED, EXTENDED RELEASE ORAL 3 TIMES DAILY
COMMUNITY

## 2019-04-10 ENCOUNTER — CARE COORDINATION (OUTPATIENT)
Dept: CARE COORDINATION | Age: 74
End: 2019-04-10

## 2019-04-12 ENCOUNTER — CARE COORDINATION (OUTPATIENT)
Dept: CARE COORDINATION | Age: 74
End: 2019-04-12

## 2019-04-12 NOTE — CARE COORDINATION
Ambulatory Care Coordination Note  4/12/2019  CM Risk Score: 3  [place] Mortality Risk Score: 17    ACC: Peace Mota RN    Summary Note: Spoke with patient, this morning . Morning BSs normally less then 110. Recent A1C; 6.3. Reports several weeks ago before dinner he had  An episode of hypoglycemia. BS returned to normal after having glucose tablets and candy. Denies any further episodes   Hes knowledgeable of his diabetes, and how to treat symptoms of hypo/hyperglycemia. Hes complinat following a diabetic diet and taking his meds. Breathing is stable, denies shortness of breath. Weight is stable, denies edema. Followed by cardiology   He sees urology every 3 months for a  cysto d/t bladder cancer. Having a procedure for bladder ca next month, hasnt been scheduled yet. Followed by urology. He has his meds, takes as directed. Denies any needs. Encouraged to call office as needed for concerns or questions. Voiced understanding. CC plan; review for care coordination discharge / graduation with provider     Diabetes Assessment    Medic Alert ID:  No  Meal Planning:  Plate Method, Avoidance of concentrated sweets, Carb counting   How often do you test your blood sugar?:  Daily, Meals, Bedtime   Do you have barriers with adherence to non-pharmacologic self-management interventions?  (Nutrition/Exercise/Self-Monitoring):  No   Have you ever had to go to the ED for symptoms of low blood sugar?:  No       No patient-reported symptoms      ,   Congestive Heart Failure Assessment    Are you currently restricting fluids?:  No Restriction  Do you understand a low sodium diet?:  Yes  Do you understand how to read food labels?:  Yes  How many restaurant meals do you eat per week?:  1-2  Do you salt your food before tasting it?:  No     No patient-reported symptoms      Symptoms:          and   General Assessment    Do you have any symptoms that are causing concern?:  No             Care Coordination Interventions    Program Enrollment:  Rising Risk  Referral from Primary Care Provider:  Yes  Suggested Interventions and Community Resources  Diabetes Education:  Declined  Registered Dietician:  Declined  Zone Management Tools:  Completed         Goals Addressed     None          Prior to Admission medications    Medication Sig Start Date End Date Taking? Authorizing Provider   morphine (MS CONTIN) 30 MG extended release tablet Take 30 mg by mouth 3 times daily.     Historical Provider, MD   DULERA 100-5 MCG/ACT inhaler INHALE TWO PUFFS BY MOUTH TWICE A DAY 4/2/19   JOSE Carpenter Cea, CNP   captopril (CAPOTEN) 25 MG tablet TAKE ONE TABLET BY MOUTH THREE TIMES A DAY 3/25/19   [facility]   lansoprazole (PREVACID) 30 MG delayed release capsule TAKE ONE CAPSULE BY MOUTH DAILY 3/18/19   JOSE Torres CNP   diphenhydrAMINE-APAP, sleep, (TYLENOL PM EXTRA STRENGTH)  MG tablet Take 1 tablet by mouth nightly as needed for Sleep 2/25/19   JOSE Trores CNP   atorvastatin (LIPITOR) 40 MG tablet TAKE ONE TABLET BY MOUTH DAILY 2/14/19   [facility], JOSE Mcdaniel CNP   potassium chloride (KLOR-CON M) 20 MEQ extended release tablet TAKE ONE TABLET BY MOUTH DAILY 2/14/19   [facility] CNP   atenolol (TENORMIN) 25 MG tablet TAKE ONE TABLET BY MOUTH DAILY 2/14/19   [facility] CNP   allopurinol (ZYLOPRIM) 100 MG tablet TAKE ONE TABLET BY MOUTH DAILY 2/14/19   [facility] CNP   isosorbide mononitrate (IMDUR) 60 MG extended release tablet TAKE ONE TABLET BY MOUTH DAILY 11/27/18   [facility] CNP   ranolazine (RANEXA) 500 MG extended release tablet Take 1 tablet by mouth 2 times daily 11/1/18   JOSE Torres CNP   Magnesium Oxide 500 MG TABS Take 500 mg by mouth daily 10/12/18   Emmanuel Acevedo MD   ferrous sulfate (FE TABS) 325 (65 Fe) MG EC tablet Take 1 tablet by mouth 2 times daily  Patient taking

## 2019-04-16 LAB — UROTHELIAL CANCER DETECTION: NORMAL

## 2019-04-19 ENCOUNTER — TELEPHONE (OUTPATIENT)
Dept: UROLOGY | Age: 74
End: 2019-04-19

## 2019-04-19 NOTE — TELEPHONE ENCOUNTER
Called pt spoke to wife \"Monisha\" informed her our surgery scheduler will be contacting pt to schedule surgery. Penny Ortiz gave verbal understanding and informed writer to please have it schedule any time after May 14. Call ended.

## 2019-05-02 NOTE — TELEPHONE ENCOUNTER
Last OV 2/25/19    Health Maintenance   Topic Date Due    DTaP/Tdap/Td vaccine (1 - Tdap) 11/[birth year]/1963    Low dose CT lung screening  11/25/1999    Shingles Vaccine (2 of 3) 06/25/2015    Diabetic retinal exam  04/12/2017    Diabetic foot exam  02/01/2018    Lipid screen  08/01/2019    Flu vaccine (Season Ended) 09/01/2019    Potassium monitoring  09/04/2019    Creatinine monitoring  09/04/2019    A1C test (Diabetic or Prediabetic)  02/25/2020    Colon cancer screen colonoscopy  05/13/2021    Pneumococcal 65+ years Vaccine  Completed             (applicable per patient's age: Cancer Screenings, Depression Screening, Fall Risk Screening, Immunizations)    Hemoglobin A1C (%)   Date Value   02/25/2019 6.3   08/22/2018 6.5   10/16/2017 6.2     LDL Cholesterol (mg/dL)   Date Value   08/01/2018 61     LDL Calculated (mg/dL)   Date Value   11/18/2015 74     AST (U/L)   Date Value   09/04/2018 15     ALT (U/L)   Date Value   09/04/2018 12     BUN (mg/dL)   Date Value   09/04/2018 29 (H)      (goal A1C is < 7)   (goal LDL is <100) need 30-50% reduction from baseline     BP Readings from Last 3 Encounters:   04/09/19 110/70   02/25/19 116/84   01/04/19 128/72    (goal /80)      All Future Testing planned in [facility]:  Lab Frequency Next Occurrence   Lipid Panel Once 10/13/2018   CBC Once 10/13/2018   Comprehensive Metabolic Panel Once 42/64/9128   Hemoglobin A1C Once 10/13/2018   CBC Once 05/28/2019   Comprehensive Metabolic Panel Once 04/04/9918   Lipid Panel Once 05/28/2019   TSH with Reflex Once 05/28/2019   Hemoglobin A1C Once 05/28/2019   Iron And TIBC Once 02/25/2019       Next Visit Date:  Future Appointments   Date Time Provider Diandra Horton   8/12/2019  8:20 AM JOSE Tucker CNP [facility]            Patient Active Problem List:     AAA (abdominal aortic aneurysm) ([address]     Hypertension     Hyperlipidemia     OA (osteoarthritis) of hip     Colon polyps     Gout     GERD (gastroesophageal reflux disease)     Oropharyngeal dysphagia     Post laminectomy syndrome     Tobacco use     Ventral hernia     Obese     Chronic back pain sees Dr Che Saavedra     DDD (degenerative disc disease), lumbar     Diabetic nephropathy with proteinuria ([facility]     Type 2 diabetes with nephropathy (HCC)     Mitral regurgitation     Hx of CABG     Macrocytosis without anemia     Coronary artery disease involving native coronary artery of native heart without angina pectoris     Ear lesion     Chondrodermatitis nodularis chronica helicis     Actinic keratosis     Neoplasm of uncertain behavior of skin     CHF (congestive heart failure) (HCC)     CAD (coronary artery disease)     Obesity (BMI 30-39. 9)     History of congestive heart failure     Central sleep apnea

## 2019-05-03 DIAGNOSIS — I25.10 CORONARY ARTERY DISEASE INVOLVING NATIVE HEART, ANGINA PRESENCE UNSPECIFIED, UNSPECIFIED VESSEL OR LESION TYPE: ICD-10-CM

## 2019-05-03 RX ORDER — RANOLAZINE 500 MG/1
TABLET, FILM COATED, EXTENDED RELEASE ORAL
Qty: 180 TABLET | Refills: 0 | Status: SHIPPED | OUTPATIENT
Start: 2019-05-03 | End: 2019-08-01 | Stop reason: SDUPTHER

## 2019-05-13 ENCOUNTER — TELEPHONE (OUTPATIENT)
Dept: UROLOGY | Age: 74
End: 2019-05-13

## 2019-05-13 NOTE — TELEPHONE ENCOUNTER
ALISSON Gomes @ [address] [place] [address] 5/21/19 1:30pm **STOP BLOOD THINNERS 5/14/19**  PAT @ [address] [place] [address] 5/14/19 9:00am           Spoke with patient, procedure info emailed 5/13/19.

## 2019-05-14 ENCOUNTER — HOSPITAL ENCOUNTER (OUTPATIENT)
Dept: PREADMISSION TESTING | Age: 74
Discharge: HOME | End: 2019-05-18
Payer: MEDICARE

## 2019-05-14 VITALS
SYSTOLIC BLOOD PRESSURE: 110 MMHG | WEIGHT: 236 LBS | HEART RATE: 63 BPM | OXYGEN SATURATION: 98 % | HEIGHT: 71 IN | RESPIRATION RATE: 16 BRPM | BODY MASS INDEX: 33.04 KG/M2 | DIASTOLIC BLOOD PRESSURE: 70 MMHG | TEMPERATURE: 97.7 F

## 2019-05-14 LAB
ABSOLUTE EOS #: 0.3 K/UL (ref 0–0.4)
ABSOLUTE IMMATURE GRANULOCYTE: ABNORMAL K/UL (ref 0–0.3)
ABSOLUTE LYMPH #: 1.9 K/UL (ref 1–4.8)
ABSOLUTE MONO #: 0.6 K/UL (ref 0.1–1.3)
ANION GAP SERPL CALCULATED.3IONS-SCNC: 16 MMOL/L (ref 9–17)
BASOPHILS # BLD: 1 % (ref 0–2)
BASOPHILS ABSOLUTE: 0 K/UL (ref 0–0.2)
BUN BLDV-MCNC: 32 MG/DL (ref 8–23)
BUN/CREAT BLD: ABNORMAL (ref 9–20)
CALCIUM SERPL-MCNC: 9.4 MG/DL (ref 8.6–10.4)
CHLORIDE BLD-SCNC: 100 MMOL/L (ref 98–107)
CO2: 22 MMOL/L (ref 20–31)
CREAT SERPL-MCNC: 1.31 MG/DL (ref 0.7–1.2)
DIFFERENTIAL TYPE: ABNORMAL
EOSINOPHILS RELATIVE PERCENT: 3 % (ref 0–4)
GFR AFRICAN AMERICAN: >60 ML/MIN
GFR NON-AFRICAN AMERICAN: 53 ML/MIN
GFR SERPL CREATININE-BSD FRML MDRD: ABNORMAL ML/MIN/{1.73_M2}
GFR SERPL CREATININE-BSD FRML MDRD: ABNORMAL ML/MIN/{1.73_M2}
GLUCOSE BLD-MCNC: 193 MG/DL (ref 70–99)
HCT VFR BLD CALC: 35.2 % (ref 41–53)
HEMOGLOBIN: 11.6 G/DL (ref 13.5–17.5)
IMMATURE GRANULOCYTES: ABNORMAL %
LYMPHOCYTES # BLD: 24 % (ref 24–44)
MCH RBC QN AUTO: 33.5 PG (ref 26–34)
MCHC RBC AUTO-ENTMCNC: 32.9 G/DL (ref 31–37)
MCV RBC AUTO: 101.9 FL (ref 80–100)
MONOCYTES # BLD: 8 % (ref 1–7)
NRBC AUTOMATED: ABNORMAL PER 100 WBC
PDW BLD-RTO: 16 % (ref 11.5–14.9)
PLATELET # BLD: 301 K/UL (ref 150–450)
PLATELET ESTIMATE: ABNORMAL
PMV BLD AUTO: 8.1 FL (ref 6–12)
POTASSIUM SERPL-SCNC: 4.4 MMOL/L (ref 3.7–5.3)
RBC # BLD: 3.46 M/UL (ref 4.5–5.9)
RBC # BLD: ABNORMAL 10*6/UL
SEG NEUTROPHILS: 64 % (ref 36–66)
SEGMENTED NEUTROPHILS ABSOLUTE COUNT: 5 K/UL (ref 1.3–9.1)
SODIUM BLD-SCNC: 138 MMOL/L (ref 135–144)
WBC # BLD: 7.8 K/UL (ref 3.5–11)
WBC # BLD: ABNORMAL 10*3/UL

## 2019-05-14 PROCEDURE — 80048 BASIC METABOLIC PNL TOTAL CA: CPT

## 2019-05-14 PROCEDURE — 85025 COMPLETE CBC W/AUTO DIFF WBC: CPT

## 2019-05-14 PROCEDURE — 93005 ELECTROCARDIOGRAM TRACING: CPT

## 2019-05-14 PROCEDURE — 36415 COLL VENOUS BLD VENIPUNCTURE: CPT

## 2019-05-14 ASSESSMENT — PAIN DESCRIPTION - LOCATION: LOCATION: BACK

## 2019-05-14 ASSESSMENT — PAIN SCALES - GENERAL: PAINLEVEL_OUTOF10: 5

## 2019-05-14 ASSESSMENT — PAIN DESCRIPTION - PAIN TYPE: TYPE: CHRONIC PAIN

## 2019-05-14 NOTE — H&P
HISTORY and [facility] [address]         NAME:  Yamilex Shah  MRN: [id number]   YOB: 1945   Date: 5/14/2019   Age: 75 y.o. Gender: male       COMPLAINT AND PRESENT HISTORY:    Eddie Maxwell is  A 75 yr old male here for Pre Admission testing, He will have a Cyst  TUR bladder tumor resection done,   with  [facility] on 5/21/19. He has bladder carcinoma,  He has no visible blood in urine,  He had a scope done last month for follow up as he does every 3 months and a new tumor was noted. He had a First onset of tumor in Jan 2018 and had it removed and did well, Has not had Chemo but had BCG tx x6 and then x3 but stopped due to 40# wt loss and weakness, and poor appetite. He had a AAA repair by Dr Hector Gaspar 2002. CABG with 12/1984 2 vessels at [facility] Dr Mendoza Marquez,   Cardiac clearance per Dr Gertrude Ruiz       Retired   DIAGNOSTIC RESULTS   Radiology:     EKG:   Labs:    U/A:      PAST MEDICAL HISTORY     Past Medical History:   Diagnosis Date    AAA (abdominal aortic aneurysm) ([address]     Dr Hector Gaspar, repaired    CAD (coronary artery disease)     Dr Leandro Munguia [facility])     bladder    Carotid artery stenosis     Central sleep apnea 10/16/2017    CHF (congestive heart failure) (HCC)     Colon polyps     Dr April Mallory DDD (degenerative disc disease), lumbar 11/3/2015    Diabetic nephropathy with proteinuria ([address] 11/20/2015    Full dentures     Upper only    GERD (gastroesophageal reflux disease) 7/10/2014    Gout 2/7/2014    Heart attack ([address] 1984    Hx of CABG 12/9/2015    Hyperlipidemia     Hypertension     On Rx.     Mitral regurgitation 12/9/2015    Oropharyngeal dysphagia 7/10/2014    Post laminectomy syndrome 9/24/2014    Tobacco use 11/25/2014    Type 2 diabetes with nephropathy ([address] 11/20/2015    Wears glasses        Pt denies any history of Diabetes mellitus type 2, hypertension, stroke, heart disease, COPD, Asthma, GERD, HLD, Cancer, Years: 50.00     Pack years: 25.00     Types: Cigarettes     Start date: 1867    Smokeless tobacco: Never Used   Substance and Sexual Activity    Alcohol use: No    Drug use: No    Sexual activity: None   Lifestyle    Physical activity:     Days per week: None     Minutes per session: None    Stress: None   Relationships    Social connections:     Talks on phone: None     Gets together: None     Attends Yazidism service: None     Active member of club or organization: None     Attends meetings of clubs or organizations: None     Relationship status: None    Intimate partner violence:     Fear of current or ex partner: None     Emotionally abused: None     Physically abused: None     Forced sexual activity: None   Other Topics Concern    None   Social History Narrative    None           REVIEW OF SYSTEMS      Allergies   Allergen Reactions    Baclofen Shortness Of Breath    Methadone Shortness Of Breath    Iodides Nausea And Vomiting       Current Outpatient Medications on File Prior to Encounter   Medication Sig Dispense Refill    aspirin 81 MG tablet Take 81 mg by mouth daily      RANEXA 500 MG extended release tablet TAKE ONE TABLET BY MOUTH TWICE A  tablet 0    metFORMIN (GLUCOPHAGE) 500 MG tablet TAKE TWO TABLETS BY MOUTH TWICE A DAY WITH MEALS 360 tablet 0    morphine (MS CONTIN) 30 MG extended release tablet Take 30 mg by mouth 3 times daily.       DULERA 100-5 MCG/ACT inhaler INHALE TWO PUFFS BY MOUTH TWICE A DAY 1 Inhaler 5    captopril (CAPOTEN) 25 MG tablet TAKE ONE TABLET BY MOUTH THREE TIMES A  tablet 3    lansoprazole (PREVACID) 30 MG delayed release capsule TAKE ONE CAPSULE BY MOUTH DAILY 90 capsule 3    atorvastatin (LIPITOR) 40 MG tablet TAKE ONE TABLET BY MOUTH DAILY 90 tablet 3    potassium chloride (KLOR-CON M) 20 MEQ extended release tablet TAKE ONE TABLET BY MOUTH DAILY 90 tablet 3    atenolol (TENORMIN) 25 MG tablet TAKE ONE TABLET BY MOUTH DAILY 90 tablet 3 kg/m²  Body mass index is 32.92 kg/m². GENERAL APPEARANCE:  Jabier Halsted is 75 y.o.,  male, mildly obese, nourished, conscious, alert. Has no pain           SKIN:  Skin intact     HEAD:  Normocephalic. Face symmetrical.    EYES:  ELLIOT EOMI and gaze is conjugate . EARS:  No  hearing loss. NOSE:  Nares patent   Mucosa is moist      THROAT:  Pharynx is not erythematous, Uvula is midline. Upper dentures No loose lower dentures     NECK:  No stiffness, trachea midline. Has no adenopathy    CHEST:   Good chest excursion  Sternotomy scar. HEART:   HT regular and no murmer . LUNGS:  Breath sounds are clear, Equal on e xpansion, normal breath sounds. Has no wheezes. ABDOMEN:  Abdomen is not tender and is soft on palpation. No palpable organomegaly. LYMPHATICS:  No palpable cervical adenopathy. LOCOMOTOR, BACK AND SPINE:  Spine midline and no scoliosis        EXTREMITIES:   Steady gait,  No pedal edema. Extremities are symmetrical,   No calf pain with flexion extension of feet,  Grasp strength is 5/5     NEUROLOGIC:  Alert and speech clear,  No apparent focal sensory or motor deficits. PROVISIONAL DIAGNOSES:      For Cysto and TUR tumor resection   Active Problems:    * No active hospital problems. *  Resolved Problems:    * No resolved hospital problems.  JOSE Velazquez - CNP on 5/14/2019 at 9:48 AM

## 2019-05-15 ENCOUNTER — ANESTHESIA EVENT (OUTPATIENT)
Dept: OPERATING ROOM | Age: 74
End: 2019-05-15
Payer: MEDICARE

## 2019-05-15 RX ORDER — SODIUM CHLORIDE 0.9 % (FLUSH) 0.9 %
10 SYRINGE (ML) INJECTION EVERY 12 HOURS SCHEDULED
Status: CANCELLED | OUTPATIENT
Start: 2019-05-15

## 2019-05-15 RX ORDER — LIDOCAINE HYDROCHLORIDE 10 MG/ML
1 INJECTION, SOLUTION EPIDURAL; INFILTRATION; INTRACAUDAL; PERINEURAL
Status: CANCELLED | OUTPATIENT
Start: 2019-05-15 | End: 2019-05-15

## 2019-05-15 RX ORDER — SODIUM CHLORIDE 0.9 % (FLUSH) 0.9 %
10 SYRINGE (ML) INJECTION PRN
Status: CANCELLED | OUTPATIENT
Start: 2019-05-15

## 2019-05-15 RX ORDER — SODIUM CHLORIDE 9 MG/ML
INJECTION, SOLUTION INTRAVENOUS CONTINUOUS
Status: CANCELLED | OUTPATIENT
Start: 2019-05-15

## 2019-05-17 DIAGNOSIS — I25.10 CORONARY ARTERY DISEASE INVOLVING NATIVE HEART, ANGINA PRESENCE UNSPECIFIED, UNSPECIFIED VESSEL OR LESION TYPE: ICD-10-CM

## 2019-05-17 RX ORDER — FUROSEMIDE 80 MG
TABLET ORAL
Qty: 90 TABLET | Refills: 2 | Status: ON HOLD | OUTPATIENT
Start: 2019-05-17 | End: 2019-11-02 | Stop reason: HOSPADM

## 2019-05-17 NOTE — TELEPHONE ENCOUNTER
Last OV 02/25/2019    Health Maintenance   Topic Date Due    DTaP/Tdap/Td vaccine (1 - Tdap) 11/25/1963    Shingles Vaccine (2 of 3) 06/25/2015    Diabetic retinal exam  04/12/2017    Diabetic foot exam  02/01/2018    Lipid screen  08/01/2019    Flu vaccine (Season Ended) 09/01/2019    A1C test (Diabetic or Prediabetic)  02/25/2020    Potassium monitoring  05/14/2020    Creatinine monitoring  05/14/2020    Colon cancer screen colonoscopy  05/13/2021    Pneumococcal 65+ years Vaccine  Completed             (applicable per patient's age: Cancer Screenings, Depression Screening, Fall Risk Screening, Immunizations)    Hemoglobin A1C (%)   Date Value   02/25/2019 6.3   08/22/2018 6.5   10/16/2017 6.2     LDL Cholesterol (mg/dL)   Date Value   08/01/2018 61     LDL Calculated (mg/dL)   Date Value   11/18/2015 74     AST (U/L)   Date Value   09/04/2018 15     ALT (U/L)   Date Value   09/04/2018 12     BUN (mg/dL)   Date Value   05/14/2019 32 (H)      (goal A1C is < 7)   (goal LDL is <100) need 30-50% reduction from baseline     BP Readings from Last 3 Encounters:   05/14/19 110/70   04/09/19 110/70   02/25/19 116/84    (goal /80)      All Future Testing planned in [facility]:  Lab Frequency Next Occurrence   Lipid Panel Once 10/13/2018   CBC Once 10/13/2018   Comprehensive Metabolic Panel Once 24/55/7652   Hemoglobin A1C Once 10/13/2018   CBC Once 05/28/2019   Comprehensive Metabolic Panel Once 62/62/0694   Lipid Panel Once 05/28/2019   TSH with Reflex Once 05/28/2019   Hemoglobin A1C Once 05/28/2019   Iron And TIBC Once 02/25/2019       Next Visit Date:  Future Appointments   Date Time Provider Diandra Horton   5/28/2019  2:50 PM Sulema Whittington MD [facility]   8/12/2019  8:20 AM JOSE Win - CNP [place]  [facility]            Patient Active Problem List:     AAA (abdominal aortic aneurysm) ([address]     Hypertension     Hyperlipidemia     OA (osteoarthritis) of hip     Colon polyps Gout     GERD (gastroesophageal reflux disease)     Oropharyngeal dysphagia     Post laminectomy syndrome     Tobacco use     Ventral hernia     Obese     Chronic back pain sees Dr South Law     DDD (degenerative disc disease), lumbar     Diabetic nephropathy with proteinuria ([address]     Type 2 diabetes with nephropathy (HCC)     Mitral regurgitation     Hx of CABG     Macrocytosis without anemia     Coronary artery disease involving native coronary artery of native heart without angina pectoris     Ear lesion     Chondrodermatitis nodularis chronica helicis     Actinic keratosis     Neoplasm of uncertain behavior of skin     CHF (congestive heart failure) (HCC)     CAD (coronary artery disease)     Obesity (BMI 30-39. 9)     History of congestive heart failure     Central sleep apnea

## 2019-05-21 ENCOUNTER — HOSPITAL ENCOUNTER (OUTPATIENT)
Age: 74
Setting detail: OUTPATIENT SURGERY
Discharge: HOME | End: 2019-05-21
Attending: UROLOGY | Admitting: UROLOGY
Payer: MEDICARE

## 2019-05-21 ENCOUNTER — ANESTHESIA (OUTPATIENT)
Dept: OPERATING ROOM | Age: 74
End: 2019-05-21
Payer: MEDICARE

## 2019-05-21 VITALS
OXYGEN SATURATION: 97 % | TEMPERATURE: 97.3 F | DIASTOLIC BLOOD PRESSURE: 83 MMHG | HEIGHT: 71 IN | RESPIRATION RATE: 16 BRPM | SYSTOLIC BLOOD PRESSURE: 134 MMHG | HEART RATE: 57 BPM | WEIGHT: 236 LBS | BODY MASS INDEX: 33.04 KG/M2

## 2019-05-21 VITALS
OXYGEN SATURATION: 99 % | TEMPERATURE: 95.9 F | DIASTOLIC BLOOD PRESSURE: 86 MMHG | SYSTOLIC BLOOD PRESSURE: 117 MMHG | RESPIRATION RATE: 2 BRPM

## 2019-05-21 LAB
GLUCOSE BLD-MCNC: 119 MG/DL (ref 75–110)
GLUCOSE BLD-MCNC: 119 MG/DL (ref 75–110)

## 2019-05-21 PROCEDURE — 2580000003 HC RX 258: Performed by: NURSE ANESTHETIST, CERTIFIED REGISTERED

## 2019-05-21 PROCEDURE — 3600000012 HC SURGERY LEVEL 2 ADDTL 15MIN: Performed by: UROLOGY

## 2019-05-21 PROCEDURE — 6360000002 HC RX W HCPCS: Performed by: NURSE ANESTHETIST, CERTIFIED REGISTERED

## 2019-05-21 PROCEDURE — 82947 ASSAY GLUCOSE BLOOD QUANT: CPT

## 2019-05-21 PROCEDURE — 7100000031 HC ASPR PHASE II RECOVERY - ADDTL 15 MIN: Performed by: UROLOGY

## 2019-05-21 PROCEDURE — C1769 GUIDE WIRE: HCPCS | Performed by: UROLOGY

## 2019-05-21 PROCEDURE — 2500000003 HC RX 250 WO HCPCS: Performed by: NURSE ANESTHETIST, CERTIFIED REGISTERED

## 2019-05-21 PROCEDURE — 7100000001 HC PACU RECOVERY - ADDTL 15 MIN: Performed by: UROLOGY

## 2019-05-21 PROCEDURE — 88307 TISSUE EXAM BY PATHOLOGIST: CPT

## 2019-05-21 PROCEDURE — 3700000001 HC ADD 15 MINUTES (ANESTHESIA): Performed by: UROLOGY

## 2019-05-21 PROCEDURE — 2709999900 HC NON-CHARGEABLE SUPPLY: Performed by: UROLOGY

## 2019-05-21 PROCEDURE — 7100000000 HC PACU RECOVERY - FIRST 15 MIN: Performed by: UROLOGY

## 2019-05-21 PROCEDURE — 3700000000 HC ANESTHESIA ATTENDED CARE: Performed by: UROLOGY

## 2019-05-21 PROCEDURE — 3600000002 HC SURGERY LEVEL 2 BASE: Performed by: UROLOGY

## 2019-05-21 PROCEDURE — 2580000003 HC RX 258: Performed by: ANESTHESIOLOGY

## 2019-05-21 PROCEDURE — 2720000010 HC SURG SUPPLY STERILE: Performed by: UROLOGY

## 2019-05-21 PROCEDURE — 7100000030 HC ASPR PHASE II RECOVERY - FIRST 15 MIN: Performed by: UROLOGY

## 2019-05-21 RX ORDER — LABETALOL 20 MG/4 ML (5 MG/ML) INTRAVENOUS SYRINGE
5 EVERY 10 MIN PRN
Status: DISCONTINUED | OUTPATIENT
Start: 2019-05-21 | End: 2019-05-21 | Stop reason: HOSPADM

## 2019-05-21 RX ORDER — NEOSTIGMINE METHYLSULFATE 5 MG/5 ML
SYRINGE (ML) INTRAVENOUS PRN
Status: DISCONTINUED | OUTPATIENT
Start: 2019-05-21 | End: 2019-05-21 | Stop reason: SDUPTHER

## 2019-05-21 RX ORDER — ONDANSETRON 2 MG/ML
INJECTION INTRAMUSCULAR; INTRAVENOUS PRN
Status: DISCONTINUED | OUTPATIENT
Start: 2019-05-21 | End: 2019-05-21 | Stop reason: SDUPTHER

## 2019-05-21 RX ORDER — SODIUM CHLORIDE 0.9 % (FLUSH) 0.9 %
10 SYRINGE (ML) INJECTION EVERY 12 HOURS SCHEDULED
Status: DISCONTINUED | OUTPATIENT
Start: 2019-05-21 | End: 2019-05-21 | Stop reason: HOSPADM

## 2019-05-21 RX ORDER — MIDAZOLAM HYDROCHLORIDE 1 MG/ML
INJECTION INTRAMUSCULAR; INTRAVENOUS PRN
Status: DISCONTINUED | OUTPATIENT
Start: 2019-05-21 | End: 2019-05-21 | Stop reason: SDUPTHER

## 2019-05-21 RX ORDER — MORPHINE SULFATE 2 MG/ML
2 INJECTION, SOLUTION INTRAMUSCULAR; INTRAVENOUS EVERY 5 MIN PRN
Status: DISCONTINUED | OUTPATIENT
Start: 2019-05-21 | End: 2019-05-21 | Stop reason: HOSPADM

## 2019-05-21 RX ORDER — DEXAMETHASONE SODIUM PHOSPHATE 4 MG/ML
INJECTION, SOLUTION INTRA-ARTICULAR; INTRALESIONAL; INTRAMUSCULAR; INTRAVENOUS; SOFT TISSUE PRN
Status: DISCONTINUED | OUTPATIENT
Start: 2019-05-21 | End: 2019-05-21 | Stop reason: SDUPTHER

## 2019-05-21 RX ORDER — ONDANSETRON 2 MG/ML
4 INJECTION INTRAMUSCULAR; INTRAVENOUS
Status: DISCONTINUED | OUTPATIENT
Start: 2019-05-21 | End: 2019-05-21 | Stop reason: HOSPADM

## 2019-05-21 RX ORDER — PROPOFOL 10 MG/ML
INJECTION, EMULSION INTRAVENOUS PRN
Status: DISCONTINUED | OUTPATIENT
Start: 2019-05-21 | End: 2019-05-21 | Stop reason: SDUPTHER

## 2019-05-21 RX ORDER — DIPHENHYDRAMINE HYDROCHLORIDE 50 MG/ML
12.5 INJECTION INTRAMUSCULAR; INTRAVENOUS
Status: DISCONTINUED | OUTPATIENT
Start: 2019-05-21 | End: 2019-05-21 | Stop reason: HOSPADM

## 2019-05-21 RX ORDER — LIDOCAINE HYDROCHLORIDE 10 MG/ML
INJECTION, SOLUTION EPIDURAL; INFILTRATION; INTRACAUDAL; PERINEURAL PRN
Status: DISCONTINUED | OUTPATIENT
Start: 2019-05-21 | End: 2019-05-21 | Stop reason: SDUPTHER

## 2019-05-21 RX ORDER — LIDOCAINE HYDROCHLORIDE 10 MG/ML
1 INJECTION, SOLUTION EPIDURAL; INFILTRATION; INTRACAUDAL; PERINEURAL
Status: DISCONTINUED | OUTPATIENT
Start: 2019-05-21 | End: 2019-05-21 | Stop reason: HOSPADM

## 2019-05-21 RX ORDER — FENTANYL CITRATE 50 UG/ML
INJECTION, SOLUTION INTRAMUSCULAR; INTRAVENOUS PRN
Status: DISCONTINUED | OUTPATIENT
Start: 2019-05-21 | End: 2019-05-21 | Stop reason: SDUPTHER

## 2019-05-21 RX ORDER — ROCURONIUM BROMIDE 10 MG/ML
INJECTION, SOLUTION INTRAVENOUS PRN
Status: DISCONTINUED | OUTPATIENT
Start: 2019-05-21 | End: 2019-05-21 | Stop reason: SDUPTHER

## 2019-05-21 RX ORDER — SODIUM CHLORIDE 0.9 % (FLUSH) 0.9 %
10 SYRINGE (ML) INJECTION PRN
Status: DISCONTINUED | OUTPATIENT
Start: 2019-05-21 | End: 2019-05-21 | Stop reason: HOSPADM

## 2019-05-21 RX ORDER — CEFAZOLIN SODIUM 1 G/3ML
INJECTION, POWDER, FOR SOLUTION INTRAMUSCULAR; INTRAVENOUS PRN
Status: DISCONTINUED | OUTPATIENT
Start: 2019-05-21 | End: 2019-05-21 | Stop reason: SDUPTHER

## 2019-05-21 RX ORDER — MEPERIDINE HYDROCHLORIDE 50 MG/ML
12.5 INJECTION INTRAMUSCULAR; INTRAVENOUS; SUBCUTANEOUS EVERY 5 MIN PRN
Status: DISCONTINUED | OUTPATIENT
Start: 2019-05-21 | End: 2019-05-21 | Stop reason: HOSPADM

## 2019-05-21 RX ORDER — SODIUM CHLORIDE 9 MG/ML
INJECTION, SOLUTION INTRAVENOUS CONTINUOUS
Status: DISCONTINUED | OUTPATIENT
Start: 2019-05-21 | End: 2019-05-21 | Stop reason: HOSPADM

## 2019-05-21 RX ORDER — SODIUM CHLORIDE 9 MG/ML
INJECTION, SOLUTION INTRAVENOUS CONTINUOUS PRN
Status: DISCONTINUED | OUTPATIENT
Start: 2019-05-21 | End: 2019-05-21 | Stop reason: SDUPTHER

## 2019-05-21 RX ORDER — GLYCOPYRROLATE 1 MG/5 ML
SYRINGE (ML) INTRAVENOUS PRN
Status: DISCONTINUED | OUTPATIENT
Start: 2019-05-21 | End: 2019-05-21 | Stop reason: SDUPTHER

## 2019-05-21 RX ADMIN — PROPOFOL 200 MG: 10 INJECTION, EMULSION INTRAVENOUS at 13:55

## 2019-05-21 RX ADMIN — MIDAZOLAM 2 MG: 1 INJECTION INTRAMUSCULAR; INTRAVENOUS at 13:51

## 2019-05-21 RX ADMIN — Medication 0.6 MG: at 14:52

## 2019-05-21 RX ADMIN — ROCURONIUM BROMIDE 40 MG: 10 INJECTION INTRAVENOUS at 13:55

## 2019-05-21 RX ADMIN — DEXAMETHASONE SODIUM PHOSPHATE 4 MG: 4 INJECTION, SOLUTION INTRA-ARTICULAR; INTRALESIONAL; INTRAMUSCULAR; INTRAVENOUS; SOFT TISSUE at 14:27

## 2019-05-21 RX ADMIN — Medication 3 MG: at 14:52

## 2019-05-21 RX ADMIN — SODIUM CHLORIDE: 9 INJECTION, SOLUTION INTRAVENOUS at 13:51

## 2019-05-21 RX ADMIN — LIDOCAINE HYDROCHLORIDE 50 MG: 10 INJECTION, SOLUTION EPIDURAL; INFILTRATION; INTRACAUDAL; PERINEURAL at 13:55

## 2019-05-21 RX ADMIN — FENTANYL CITRATE 100 MCG: 50 INJECTION, SOLUTION INTRAMUSCULAR; INTRAVENOUS at 13:55

## 2019-05-21 RX ADMIN — SODIUM CHLORIDE: 9 INJECTION, SOLUTION INTRAVENOUS at 12:37

## 2019-05-21 RX ADMIN — ONDANSETRON 4 MG: 2 INJECTION INTRAMUSCULAR; INTRAVENOUS at 14:27

## 2019-05-21 RX ADMIN — CEFAZOLIN 2000 MG: 1 INJECTION, POWDER, FOR SOLUTION INTRAMUSCULAR; INTRAVENOUS at 14:05

## 2019-05-21 ASSESSMENT — PULMONARY FUNCTION TESTS
PIF_VALUE: 16
PIF_VALUE: 25
PIF_VALUE: 17
PIF_VALUE: 12
PIF_VALUE: 19
PIF_VALUE: 11
PIF_VALUE: 19
PIF_VALUE: 17
PIF_VALUE: 3
PIF_VALUE: 16
PIF_VALUE: 20
PIF_VALUE: 20
PIF_VALUE: 1
PIF_VALUE: 17
PIF_VALUE: 17
PIF_VALUE: 16
PIF_VALUE: 21
PIF_VALUE: 16
PIF_VALUE: 12
PIF_VALUE: 26
PIF_VALUE: 11
PIF_VALUE: 17
PIF_VALUE: 17
PIF_VALUE: 19
PIF_VALUE: 18
PIF_VALUE: 17
PIF_VALUE: 16
PIF_VALUE: 17
PIF_VALUE: 4
PIF_VALUE: 17
PIF_VALUE: 17
PIF_VALUE: 19
PIF_VALUE: 18
PIF_VALUE: 17
PIF_VALUE: 20
PIF_VALUE: 17
PIF_VALUE: 16
PIF_VALUE: 17
PIF_VALUE: 1
PIF_VALUE: 21
PIF_VALUE: 11
PIF_VALUE: 17
PIF_VALUE: 16
PIF_VALUE: 11
PIF_VALUE: 3
PIF_VALUE: 16
PIF_VALUE: 17
PIF_VALUE: 15
PIF_VALUE: 19
PIF_VALUE: 18
PIF_VALUE: 1
PIF_VALUE: 17
PIF_VALUE: 17
PIF_VALUE: 2
PIF_VALUE: 19
PIF_VALUE: 16
PIF_VALUE: 17
PIF_VALUE: 19
PIF_VALUE: 17
PIF_VALUE: 1
PIF_VALUE: 20
PIF_VALUE: 1
PIF_VALUE: 25
PIF_VALUE: 19
PIF_VALUE: 17
PIF_VALUE: 17

## 2019-05-21 ASSESSMENT — PAIN DESCRIPTION - PAIN TYPE: TYPE: CHRONIC PAIN

## 2019-05-21 ASSESSMENT — ENCOUNTER SYMPTOMS
STRIDOR: 0
SHORTNESS OF BREATH: 0

## 2019-05-21 ASSESSMENT — PAIN SCALES - GENERAL
PAINLEVEL_OUTOF10: 0
PAINLEVEL_OUTOF10: 4
PAINLEVEL_OUTOF10: 0

## 2019-05-21 ASSESSMENT — PAIN DESCRIPTION - LOCATION: LOCATION: BACK

## 2019-05-21 ASSESSMENT — LIFESTYLE VARIABLES: SMOKING_STATUS: 0

## 2019-05-21 ASSESSMENT — PAIN - FUNCTIONAL ASSESSMENT: PAIN_FUNCTIONAL_ASSESSMENT: 0-10

## 2019-05-21 NOTE — H&P
HISTORY and [facility]       NAME:  Veronika Elizalde  MRN: [id number]   YOB: 1945   Date: 5/21/2019   Age: 75 y.o. Gender: male     H&P Update Note    H&P from 05/14/2019 reviewed and updated. Patient examined in Pre Op,  Feels well  HT regular, Lungs clear,     Glucose level is 119. No new sx    INTERVAL HISTORY:     Patient is feeling well today, denies any fever/chills, chest pain, shortness of breath. No interval changes. No interval changes to past medical history, social history, family history. Review of systems as stated above and otherwise negative. PHYSICAL EXAM:     Vitals: /72   Pulse 60   Temp 97.9 °F (36.6 °C) (Oral)   Resp 16   Ht 5' 11\" (1.803 m)   Wt 236 lb (107 kg)   SpO2 96%   BMI 32.92 kg/m²  Body mass index is 32.92 kg/m². Patient is alert and oriented, in no distress. Heart rate and rhythm are regular. Lungs clear to auscultation bilaterally. Abdomen is soft, non tender. No pedal edema. No interval changes. I concur with the findings. Anita Ocasio, APRN - CNP on 5/21/2019 at 12:30 PM     HISTORY and [facility] Lynch [facility]47            NAME:  Veronika Elizalde  MRN: [id number]   YOB: 1945   Date: 5/14/2019   Age: 75 y.o. Gender: male         COMPLAINT AND PRESENT HISTORY:    Mónica Greer is  A 75 yr old male here for Pre Admission testing, He will have a Cyst  TUR bladder tumor resection done,   with Dr Milka Serrano on 5/21/19. He has bladder carcinoma,  He has no visible blood in urine,  He had a scope done last month for follow up as he does every 3 months and a new tumor was noted.     He had a First onset of tumor in Jan 2018 and had it removed and did well, Has not had Chemo but had BCG tx x6 and then x3 but stopped due to 40# wt loss and weakness, and poor appetite.     He had a AAA repair by Dr Lake Strickland 2002.    CABG with 12/1984 2 vessels at [facility] Dr Mary Reaves,   Cardiac clearance per Dr Macarena Padgett      Retired   DIAGNOSTIC RESULTS   Radiology:      EKG:   Labs:     U/A:        PAST MEDICAL HISTORY      Past Medical History   Past Medical History:   Diagnosis Date    AAA (abdominal aortic aneurysm) ([address]       Dr Rao Sullivan, repaired    CAD (coronary artery disease)       Dr Manrique [facility])       bladder    Carotid artery stenosis      Central sleep apnea 10/16/2017    CHF (congestive heart failure) (HCC)      Colon polyps       Dr Gautam Sers DDD (degenerative disc disease), lumbar 11/3/2015    Diabetic nephropathy with proteinuria ([address] 11/20/2015    Full dentures       Upper only    GERD (gastroesophageal reflux disease) 7/10/2014    Gout 2/7/2014    Heart attack ([address] 1984    Hx of CABG 12/9/2015    Hyperlipidemia      Hypertension       On Rx.     Mitral regurgitation 12/9/2015    Oropharyngeal dysphagia 7/10/2014    Post laminectomy syndrome 9/24/2014    Tobacco use 11/25/2014    Type 2 diabetes with nephropathy ([address] 11/20/2015    Wears glasses              Pt denies any history of Diabetes mellitus type 2, hypertension, stroke, heart disease, COPD, Asthma, GERD, HLD, Cancer, Seizures,Thyroid disease, Kidney Disease, Hepatitis, TB, Psychiatric Disorders or Substance abuse.     SURGICAL HISTORY        Past Surgical History         Past Surgical History:   Procedure Laterality Date    ABDOMINAL AORTIC ANEURYSM REPAIR   12/02    ARTERIAL BYPASS SURGRY   1984     Double    BACK SURGERY   2000, 2003     lumbar    CARDIAC SURGERY         cabg, 12/84    COLONOSCOPY        CORONARY ARTERY BYPASS GRAFT   12/1984    CYSTOSCOPY   02/05/2018     RT RETROGRADE TUR-BT WITH MITOMYCIN INSTILLATION    EXTERNAL EAR SURGERY Right 2017     cyst removal     HERNIA REPAIR         ventral    HIATAL HERNIA REPAIR   12/1980     Hiatal    KNEE ARTHROSCOPY Left 2005    KNEE ARTHROSCOPY Right 2006    KNEE SURGERY Left 2008    MALIGNANT SKIN LESION EXCISION Right 04/27/2017   Dr. Babak Simms   2004     abdominal muscle repair by Dr. Mariposa Strickland Left 2012    NH CYSTOURETHROSCOPY,FULGUR .5-2CM MARJORIE N/A 2/5/2018     CYSTO, BILATERAL RETROGRADE PYELOGRAM, TUR BLADDER TUMOR WITH GYRUS INSTALLATION OF MITOMYCIN performed by Olman Zamora MD at [address] [place] [address] Ave TUMOR REMOVAL Right 2009     neck, benign    VENTRAL HERNIA REPAIR Left 10/1997     Ventral    WRIST GANGLION EXCISION Left 2008            FAMILY HISTORY        Family History         Family History   Problem Relation Age of Onset    Breast Cancer Mother      High Blood Pressure Father      Heart Disease Father      Stroke Father      Heart Disease Brother      Heart Surgery Brother           Bypass            SOCIAL HISTORY        Social History   Social History            Socioeconomic History    Marital status:        Spouse name: Gisella Ocasio Number of children: 0    Years of education: None    Highest education level: None   Occupational History    None   Social Needs    Financial resource strain: None    Food insecurity:       Worry: None       Inability: None    Transportation needs:       Medical: None       Non-medical: None   Tobacco Use    Smoking status: Current Every Day Smoker       Packs/day: 0.50       Years: 50.00       Pack years: 25.00       Types: Cigarettes       Start date: 1867 Erminia Lighter Smokeless tobacco: Never Used   Substance and Sexual Activity    Alcohol use: No    Drug use: No    Sexual activity: None   Lifestyle    Physical activity:       Days per week: None       Minutes per session: None    Stress: None   Relationships    Social connections:       Talks on phone: None       Gets together: None       Attends Sikh service: None       Active member of club or organization: None       Attends meetings of clubs or organizations: None       Relationship status: None    Intimate partner violence:       Fear of current or ex partner: None     Emotionally abused: None       Physically abused: None       Forced sexual activity: None   Other Topics Concern    None   Social History Narrative    None               REVIEW OF SYSTEMS            Allergies   Allergen Reactions    Baclofen Shortness Of Breath    Methadone Shortness Of Breath    Iodides Nausea And Vomiting                Current Outpatient Medications on File Prior to Encounter   Medication Sig Dispense Refill    aspirin 81 MG tablet Take 81 mg by mouth daily        RANEXA 500 MG extended release tablet TAKE ONE TABLET BY MOUTH TWICE A  tablet 0    metFORMIN (GLUCOPHAGE) 500 MG tablet TAKE TWO TABLETS BY MOUTH TWICE A DAY WITH MEALS 360 tablet 0    morphine (MS CONTIN) 30 MG extended release tablet Take 30 mg by mouth 3 times daily.        DULERA 100-5 MCG/ACT inhaler INHALE TWO PUFFS BY MOUTH TWICE A DAY 1 Inhaler 5    captopril (CAPOTEN) 25 MG tablet TAKE ONE TABLET BY MOUTH THREE TIMES A  tablet 3    lansoprazole (PREVACID) 30 MG delayed release capsule TAKE ONE CAPSULE BY MOUTH DAILY 90 capsule 3    atorvastatin (LIPITOR) 40 MG tablet TAKE ONE TABLET BY MOUTH DAILY 90 tablet 3    potassium chloride (KLOR-CON M) 20 MEQ extended release tablet TAKE ONE TABLET BY MOUTH DAILY 90 tablet 3    atenolol (TENORMIN) 25 MG tablet TAKE ONE TABLET BY MOUTH DAILY 90 tablet 3    allopurinol (ZYLOPRIM) 100 MG tablet TAKE ONE TABLET BY MOUTH DAILY 90 tablet 3    isosorbide mononitrate (IMDUR) 60 MG extended release tablet TAKE ONE TABLET BY MOUTH DAILY 90 tablet 1    Magnesium Oxide 500 MG TABS Take 500 mg by mouth daily 90 tablet 2    ondansetron (ZOFRAN) 8 MG tablet Take 1 tablet by mouth every 12 hours as needed for Nausea or Vomiting 60 tablet 2    furosemide (LASIX) 80 MG tablet TAKE ONE TABLET BY MOUTH DAILY 90 tablet 3    diphenhydrAMINE-APAP, sleep, (TYLENOL PM EXTRA STRENGTH)  MG tablet Take 1 tablet by mouth nightly as needed for Sleep 14 tablet 0    Glucose Blood (BLOOD GLUCOSE TEST STRIPS) STRP Free Style Lite, Use 3 daily. Insulin Dependent 300 strip 5    Blood Glucose Monitoring Suppl ROMY Glucometer- Free Style Lite 1 Device 0    Lancet Device MISC 1 Device by [employer]. (Med.Supl.;Non-Drugs) route 3 times daily To check blood sugars. Free Style Lite 1 Device 0      No current facility-administered medications on file prior to encounter.                        General health:  Patient states health is overall fairly good, Eating ok sleep is problematic due to back pain                  Skin:  No skin problems      Head, eyes, ears, nose, throat:  No head injuries and no dizziness or faint Denies chronic headache.      Neck:  No pain, stiffness or masses.      Cardiovascular/Respiratory system:  S/P CABG and AAA repair as above No chest pain No cough                Gastrointestinal tract: Denies any abdominal pain, No change in bowel habits, Has a ventral hernia which has previously been repaired with mesh,  Hernia reoccurred,      Genitourinary:  See HPI       Locomotor:  Has mild OA        Neuropsychiatric:  No referable complaints.     See HPI. Cysto and removal bladder tumor trans urethral,      GENERAL PHYSICAL EXAM:         Vitals: /70   Pulse 63   Temp 97.7 °F (36.5 °C) (Oral)   Resp 16   Ht 5' 11\" (1.803 m)   Wt 236 lb (107 kg)   SpO2 98%   BMI 32.92 kg/m²  Body mass index is 32.92 kg/m².      GENERAL APPEARANCE:  Shari Saeed is 75 y.o.,  male, mildly obese, nourished, conscious, alert. Has no pain            SKIN:  Skin intact      HEAD:  Normocephalic. Face symmetrical.     EYES:  ELLIOT EOMI and gaze is conjugate .        EARS:  No  hearing loss.     NOSE:  Nares patent   Mucosa is moist       THROAT:  Pharynx is not erythematous, Uvula is midline. Upper dentures No loose lower dentures      NECK:  No stiffness, trachea midline.   Has no adenopathy     CHEST:   Good chest excursion  Sternotomy scar.     HEART:   HT regular and no murmer .       LUNGS:  Breath sounds are clear, Equal on e xpansion, normal breath sounds. Has no wheezes.        ABDOMEN:  Abdomen is not tender and is soft on palpation. No palpable organomegaly.     LYMPHATICS:  No palpable cervical adenopathy.     LOCOMOTOR, BACK AND SPINE:  Spine midline and no scoliosis         EXTREMITIES:   Steady gait,  No pedal edema. Extremities are symmetrical,   No calf pain with flexion extension of feet,  Grasp strength is 5/5      NEUROLOGIC:  Alert and speech clear,  No apparent focal sensory or motor deficits.      PROVISIONAL DIAGNOSES:       For Cysto and TUR tumor resection   Active Problems:    * No active hospital problems. *  Resolved Problems:    * No resolved hospital problems.  *        Alyssa Simental, JOSE - CNP on 5/14/2019 at 9:48 AM

## 2019-05-21 NOTE — ANESTHESIA PRE PROCEDURE
Department of Anesthesiology  Preprocedure Note       Name:  Dennis Thornton   Age:  75 y.o.  :  1945                                          MRN:  [id number]         Date:  2019      Surgeon: Anitha Garcia):  Luke Palu MD    Procedure: CYSTOSCOPY TRANSURETHRAL RESECTION BLADDER - CYSTO TUR BLADDER TUMOR (N/A Bladder)    Medications prior to admission:   Prior to Admission medications    Medication Sig Start Date End Date Taking? Authorizing Provider   furosemide (LASIX) 80 MG tablet TAKE ONE TABLET BY MOUTH DAILY 19  Yes JOSE Hawthorne CNP   aspirin 81 MG tablet Take 81 mg by mouth daily   Yes Historical Provider, MD   RANEXA 500 MG extended release tablet TAKE ONE TABLET BY MOUTH TWICE A DAY 5/3/19  Yes JOSE Hawthorne CNP   metFORMIN (GLUCOPHAGE) 500 MG tablet TAKE TWO TABLETS BY MOUTH TWICE A DAY WITH MEALS 19  Yes JOSE Hawthorne CNP   morphine (MS CONTIN) 30 MG extended release tablet Take 30 mg by mouth 3 times daily.    Yes Historical Provider, MD   DULERA 100-5 MCG/ACT inhaler INHALE TWO PUFFS BY MOUTH TWICE A DAY 19  Yes Halley HayesJOSE Butterfield CNP   captopril (CAPOTEN) 25 MG tablet TAKE ONE TABLET BY MOUTH THREE TIMES A DAY 3/25/19  Yes JOSE Medina CNP   lansoprazole (PREVACID) 30 MG delayed release capsule TAKE ONE CAPSULE BY MOUTH DAILY 3/18/19  Yes JOSE Hawthorne CNP   diphenhydrAMINE-APAP, sleep, (TYLENOL PM EXTRA STRENGTH)  MG tablet Take 1 tablet by mouth nightly as needed for Sleep 19  Yes JOSE Hawthorne CNP   atorvastatin (LIPITOR) 40 MG tablet TAKE ONE TABLET BY MOUTH DAILY 19  Yes Tin BaccaJOSE CNP   potassium chloride (KLOR-CON M) 20 MEQ extended release tablet TAKE ONE TABLET BY MOUTH DAILY 19  Yes Halley Koo Hakim, APRN - CNP   atenolol (TENORMIN) 25 MG tablet TAKE ONE TABLET BY MOUTH DAILY 19  Yes Tin BaccaJOSE CNP   allopurinol (ZYLOPRIM) 100 MG tablet TAKE ONE TABLET BY MOUTH DAILY 2/14/19  Yes JOSE Argueta CNP   isosorbide mononitrate (IMDUR) 60 MG extended release tablet TAKE ONE TABLET BY MOUTH DAILY 11/27/18  Yes JOSE Argueta CNP   Magnesium Oxide 500 MG TABS Take 500 mg by mouth daily 10/12/18  Yes Jeremiah Cantor MD   Glucose Blood (BLOOD GLUCOSE TEST STRIPS) STRP Free Style Lite, Use 3 daily. Insulin Dependent 6/25/18   JOSE Wills CNP   ondansetron (ZOFRAN) 8 MG tablet Take 1 tablet by mouth every 12 hours as needed for Nausea or Vomiting 4/9/18   JOSE Martinez CNP   Blood Glucose Monitoring Suppl ROMY Glucometer- Free Style Lite 6/12/17   Meridee Mini, DO   Lancet Device MISC 1 Device by [employer]. (Med.Supl.;Non-Drugs) route 3 times daily To check blood sugars. Free Style Lite 6/12/17   Meridee Mini, DO       Current medications:    Current Facility-Administered Medications   Medication Dose Route Frequency Provider Last Rate Last Dose    0.9 % sodium chloride infusion   Intravenous Continuous Petra Tobar  mL/hr at 05/21/19 1237      lidocaine PF 1 % injection 1 mL  1 mL Intradermal Once PRN Petra Tobar MD        sodium chloride flush 0.9 % injection 10 mL  10 mL Intravenous 2 times per day Petra Tobar MD        sodium chloride flush 0.9 % injection 10 mL  10 mL Intravenous PRN Petra Tobar MD           Allergies:     Allergies   Allergen Reactions    Baclofen Shortness Of Breath    Methadone Shortness Of Breath    Iodides Nausea And Vomiting       Problem List:    Patient Active Problem List   Diagnosis Code    [facility] I71.4    Hypertension I10    Hyperlipidemia E78.5    OA (osteoarthritis) of hip M16.9    Colon polyps K63.5    Gout M10.9    GERD (gastroesophageal reflux disease) K21.9    Oropharyngeal dysphagia R13.12    Post laminectomy syndrome M96.1    Tobacco use Z72.0    Ventral hernia K43.9    Obese E66.9    Chronic back pain sees Dr Luis Rolle M54.9, G89.29    DDD (degenerative disc disease), lumbar M51.36    Diabetic nephropathy with proteinuria ([address] E11.21    Type 2 diabetes with nephropathy ([address] E11.21    Mitral regurgitation I34.0    Hx of CABG Z95.1    Macrocytosis without anemia D75.89    Coronary artery disease involving native coronary artery of native heart without angina pectoris I25.10    Ear lesion H93.90    Chondrodermatitis nodularis chronica helicis B36.596    Actinic keratosis L57.0    Neoplasm of uncertain behavior of skin D48.5    CHF (congestive heart failure) ([facility] I50.9    CAD (coronary artery disease) I25.10    Obesity (BMI 30-39. 9) E66.9    History of congestive heart failure Z86.79    Central sleep apnea G47.31       Past Medical History:        Diagnosis Date    AAA (abdominal aortic aneurysm) ([facility])     Dr Horace Kaye, repaired    CAD (coronary artery disease)     Dr Blanca Phillips [facility])     bladder    Carotid artery stenosis     Central sleep apnea 10/16/2017    CHF (congestive heart failure) ([facility])     Colon polyps     Dr Saima Do DDD (degenerative disc disease), lumbar 11/3/2015    Diabetic nephropathy with proteinuria ([address] 11/20/2015    Full dentures     Upper only    GERD (gastroesophageal reflux disease) 7/10/2014    Gout 2/7/2014    Heart attack ([address] 1984    Hx of CABG 12/9/2015    Hyperlipidemia     Hypertension     On Rx.     Mitral regurgitation 12/9/2015    Oropharyngeal dysphagia 7/10/2014    Post laminectomy syndrome 9/24/2014    Tobacco use 11/25/2014    Type 2 diabetes with nephropathy ([address] 11/20/2015    Wears glasses        Past Surgical History:        Procedure Laterality Date    ABDOMINAL AORTIC ANEURYSM REPAIR  12/02    ARTERIAL BYPASS SURGRY  1984    Double    BACK SURGERY  2000, 2003    lumbar    CARDIAC SURGERY      cabg, 12/84    COLONOSCOPY      CORONARY ARTERY BYPASS GRAFT  12/1984    CYSTOSCOPY  02/05/2018    RT RETROGRADE TUR-BT WITH MITOMYCIN INSTILLATION    EXTERNAL EAR SURGERY Right 2017    cyst removal     HERNIA REPAIR      ventral    HIATAL HERNIA REPAIR  12/1980    Hiatal    KNEE ARTHROSCOPY Left 2005    KNEE ARTHROSCOPY Right 2006    KNEE SURGERY Left 2008    MALIGNANT SKIN LESION EXCISION Right 04/27/2017    Dr. Gracy Grier  2004    abdominal muscle repair by Dr. Bi Taylor Left 2012    SC CYSTOURETHROSCOPY,FULGUR .5-2CM LESN N/A 2/5/2018    CYSTO, BILATERAL RETROGRADE PYELOGRAM, TUR BLADDER TUMOR WITH GYRUS INSTALLATION OF MITOMYCIN performed by Keron Boyd MD at [address] TUMOR REMOVAL Right 2009    neck, benign    VENTRAL HERNIA REPAIR Left 10/1997    Ventral    WRIST GANGLION EXCISION Left 2008       Social History:    Social History     Tobacco Use    Smoking status: Current Every Day Smoker     Packs/day: 0.50     Years: 50.00     Pack years: 25.00     Types: Cigarettes     Start date: 1867    Smokeless tobacco: Never Used   Substance Use Topics    Alcohol use: No                                Ready to quit: Not Answered  Counseling given: Not Answered      Vital Signs (Current):   Vitals:    05/21/19 1207 05/21/19 1217   BP:  120/72   Pulse:  60   Resp:  16   Temp:  97.9 °F (36.6 °C)   TempSrc:  Oral   SpO2:  96%   Weight: 236 lb (107 kg)    Height: 5' 11\" (1.803 m)                                               BP Readings from Last 3 Encounters:   05/21/19 120/72   05/14/19 110/70   04/09/19 110/70       NPO Status: Time of last liquid consumption: 2230                        Time of last solid consumption: 2230                        Date of last liquid consumption: 05/20/19                        Date of last solid food consumption: 05/20/19    BMI:   Wt Readings from Last 3 Encounters:   05/21/19 236 lb (107 kg)   05/14/19 236 lb (107 kg)   04/09/19 235 lb (106.6 kg)     Body mass index is 32.92 kg/m².     CBC:   Lab Results   Component Value Date    WBC 7.8 05/14/2019    RBC 3.46 05/14/2019    HGB 11.6 05/14/2019    HCT 35.2 05/14/2019    .9 05/14/2019    RDW 16.0 05/14/2019     05/14/2019     HB 11.6    CMP:   Lab Results   Component Value Date     05/14/2019    K 4.4 05/14/2019     05/14/2019    CO2 22 05/14/2019    BUN 32 05/14/2019    CREATININE 1.31 05/14/2019    GFRAA >60 05/14/2019    LABGLOM 53 05/14/2019    GLUCOSE 193 05/14/2019    PROT 7.5 09/04/2018    CALCIUM 9.4 05/14/2019    BILITOT 0.32 09/04/2018    ALKPHOS 70 09/04/2018    AST 15 09/04/2018    ALT 12 09/04/2018       POC Tests:   Recent Labs     05/21/19  1234   POCGLU 119*       Coags: No results found for: PROTIME, INR, APTT    HCG (If Applicable): No results found for: PREGTESTUR, PREGSERUM, HCG, HCGQUANT     ABGs: No results found for: PHART, PO2ART, GLB1UQD, ZXP0NJJ, BEART, X5UMNOEW     Type & Screen (If Applicable):  No results found for: LABABO, [address]    Anesthesia Evaluation  Patient summary reviewed no history of anesthetic complications:   Airway: Mallampati: III  TM distance: >3 FB   Neck ROM: full  Mouth opening: > = 3 FB Dental:    (+) upper dentures and lower dentures      Pulmonary:Negative Pulmonary ROS and normal exam  breath sounds clear to auscultation      (-) pneumonia, COPD, asthma, shortness of breath, recent URI, sleep apnea, rhonchi, wheezes, rales, stridor and not a current smoker                           Cardiovascular:  Exercise tolerance: good (>4 METS),   (+) hypertension: no interval change, past MI: no interval change, CAD: no interval change, CABG/stent: no interval change, CHF: no interval change,     (-) pacemaker, valvular problems/murmurs, dysrhythmias,  angina, orthopnea, PND,  HOYT, murmur, weak pulses,  friction rub, systolic click, carotid bruit,  JVD and peripheral edema    ECG reviewed  Rhythm: regular  Rate: normal           Beta Blocker:  Dose within 24 Hrs         Neuro/Psych:   Negative Neuro/Psych ROS (-) seizures, neuromuscular disease, TIA, CVA, headaches, psychiatric history and depression/anxiety            GI/Hepatic/Renal:   (+) GERD: no interval change,      (-) hiatal hernia, PUD, hepatitis, liver disease, no renal disease, bowel prep and no morbid obesity       Endo/Other:    (+) DiabetesType II DM, , no malignancy/cancer. (-) hypothyroidism, hyperthyroidism, blood dyscrasia, arthritis, no electrolyte abnormalities, no malignancy/cancer               Abdominal:           Vascular: negative vascular ROS. - PVD and DVT. Anesthesia Plan      general     ASA 3       Induction: intravenous. MIPS: Postoperative opioids intended and Prophylactic antiemetics administered. Anesthetic plan and risks discussed with patient. Plan discussed with CRNA.                 Addie Borrego MD   5/21/2019

## 2019-05-21 NOTE — BRIEF OP NOTE
Brief Postoperative Note  ______________________________________________________________    Patient: Pat Pino  YOB: 1945  MRN: [id number]  Date of Procedure: 5/21/2019    Pre-Op Diagnosis: BLADDER CANCER    Post-Op Diagnosis: Same       Procedure(s):  CYSTOSCOPY TRANSURETHRAL RESECTION BLADDER - CYSTO TUR BLADDER TUMOR    Anesthesia: General    Surgeon(s):  Elizabeth Cannon MD    Assistant:     Estimated Blood Loss (mL): less than 50     Complications: None    Specimens:   ID Type Source Tests Collected by Time Destination   A : BLADDER TUMOR PER DR GUILLEN Tissue Bladder SURGICAL PATHOLOGY Elizabeth Cannon MD 5/21/2019 1451        Implants:  20 Fr 3 way hawk      Drains:   Urethral Catheter Straight-tip;Triple-lumen 20 fr (Active)       Findings: large erythematous area suspicious for tumor resected at dome of bladder. No other tumors noted.      Elizabeth Cannon MD  Date: 5/21/2019  Time: 2:56 PM

## 2019-05-22 ENCOUNTER — PROCEDURE VISIT (OUTPATIENT)
Dept: UROLOGY | Age: 74
End: 2019-05-22

## 2019-05-22 VITALS
TEMPERATURE: 98 F | SYSTOLIC BLOOD PRESSURE: 134 MMHG | HEIGHT: 71 IN | DIASTOLIC BLOOD PRESSURE: 66 MMHG | WEIGHT: 235.89 LBS | BODY MASS INDEX: 33.02 KG/M2 | HEART RATE: 75 BPM

## 2019-05-22 DIAGNOSIS — C67.9 MALIGNANT NEOPLASM OF URINARY BLADDER, UNSPECIFIED SITE (HCC): Primary | ICD-10-CM

## 2019-05-22 PROCEDURE — 99999 PR OFFICE/OUTPT VISIT,PROCEDURE ONLY: CPT | Performed by: NURSE PRACTITIONER

## 2019-05-22 NOTE — OP NOTE
[address]                 [address] [facility], [address]                                OPERATIVE REPORT    PATIENT NAME: PRINCESS ALBERTO                         :        1945  MED REC NO:   [id number]                              ROOM:  ACCOUNT NO:   [de-identified]                           ADMIT DATE: 2019  PROVIDER:     Souleymane Khalil    DATE OF PROCEDURE:  2019    PREOPERATIVE DIAGNOSIS:  Bladder cancer. POSTOPERATIVE DIAGNOSIS:  Bladder cancer. OPERATION PERFORMED:  Transurethral resection of large bladder tumor. SURGEON:  Souleymane Khalil MD    ANESTHESIA:  General.    COMPLICATIONS:  None. BLEEDING:  Minimal.    SPECIMENS:  Bladder tumor. DRAINS:  20-[place] three-way Paulino catheter. HISTORY OF PRESENT ILLNESS:  The patient is a 76-year-old male who was  diagnosed with bladder cancer roughly two years ago. He has undergone  surveillance cystoscopies. He recently was found to have a very  erythematous and bullous appearing lesion across a large portion of the  dome of his bladder. It was very suspicious for a tumor. UroVysion  FISH was positive as well. He is here today for TURBT. PROCEDURE IN DETAIL:  The patient was brought back to the operating room  and laid on the operating table in supine position. Once general  anesthesia was obtained, he was prepped and draped in the usual sterile  fashion. A timeout was performed. He was appropriately identified and  antibiotics were administered. The resectoscope was inserted through  the urethra into the bladder. Pancystoscopy was performed. No  abnormalities in the bladder were seen other than the dome where the  large erythematous and bullous area was seen. This entire area was then  thoroughly resected. The angled loop was used to get a better angle on  the resection site.   The entire resection site involved most of the dome  of the bladder and was well over 5 cm in size.  All the visible  suspicious area was thoroughly resected. I resected down to detrusor  muscle and some fat was identified. Once the entire area was thoroughly  resected, the entire resection site was then thoroughly fulgurated. No  other abnormalities were seen in the bladder. The bladder was drained  under direct visualization. Specimen was removed using an Solx  evacuator. Good hemostasis was seen. No bleeding was noted. All the  specimen was removed. A 20-Canadian three-way Paulino catheter was  inserted. The balloon was inflated and it irrigated with clear urine. The patient was hooked to very slow drip of CBI. He awoke from  anesthesia without any complications and was taken back to postoperative  anesthesia care in good condition. He will be discharged home today.    He will follow up tomorrow for Paulino catheter removal.        Rosaura Bumpers    D: 05/21/2019 15:56:00       T: 05/22/2019 3:29:17     [email or web address]  Job#: [id number]     Doc#: [id number]    CC:  Samira Leblanc

## 2019-05-24 LAB — SURGICAL PATHOLOGY REPORT: NORMAL

## 2019-05-28 ENCOUNTER — OFFICE VISIT (OUTPATIENT)
Dept: UROLOGY | Age: 74
End: 2019-05-28
Payer: MEDICARE

## 2019-05-28 VITALS — DIASTOLIC BLOOD PRESSURE: 77 MMHG | SYSTOLIC BLOOD PRESSURE: 124 MMHG | TEMPERATURE: 98.4 F | HEART RATE: 62 BPM

## 2019-05-28 DIAGNOSIS — C67.1 MALIGNANT NEOPLASM OF DOME OF URINARY BLADDER (HCC): Primary | ICD-10-CM

## 2019-05-28 PROCEDURE — G8427 DOCREV CUR MEDS BY ELIG CLIN: HCPCS | Performed by: UROLOGY

## 2019-05-28 PROCEDURE — 3017F COLORECTAL CA SCREEN DOC REV: CPT | Performed by: UROLOGY

## 2019-05-28 PROCEDURE — 4040F PNEUMOC VAC/ADMIN/RCVD: CPT | Performed by: UROLOGY

## 2019-05-28 PROCEDURE — 1123F ACP DISCUSS/DSCN MKR DOCD: CPT | Performed by: UROLOGY

## 2019-05-28 PROCEDURE — G8598 ASA/ANTIPLAT THER USED: HCPCS | Performed by: UROLOGY

## 2019-05-28 PROCEDURE — 99213 OFFICE O/P EST LOW 20 MIN: CPT | Performed by: UROLOGY

## 2019-05-28 PROCEDURE — 4004F PT TOBACCO SCREEN RCVD TLK: CPT | Performed by: UROLOGY

## 2019-05-28 PROCEDURE — G8417 CALC BMI ABV UP PARAM F/U: HCPCS | Performed by: UROLOGY

## 2019-05-28 ASSESSMENT — ENCOUNTER SYMPTOMS
SHORTNESS OF BREATH: 0
BACK PAIN: 0
ABDOMINAL PAIN: 1
VOMITING: 0
COUGH: 0
NAUSEA: 0
COLOR CHANGE: 0
EYE PAIN: 0
WHEEZING: 0
EYE REDNESS: 0

## 2019-05-28 NOTE — LETTER
[facility]  [facility] [place]  [address]  [address] [facility]  [address] [facility] [address]  Dept: [phone]  Dept Fax: [phone]        5/28/19    Patient: Brian Srivastava  YOB: 1945    Dear JOSE Atkinson CNP,    I had the pleasure of seeing one of your patients, PRINCESS Gimenez Ohm today in the office today. Below are the relevant portions of my assessment and plan of care. IMPRESSION:     1. 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 Repeat imaging needed for metastatic evaluation. Will need radical cystoprostatectomy with Ileal conduit. Not interested in chemo. Will follow up after imaging done to re-visit surgery vs XRT. No follow-ups on file. Prescriptions Ordered:  No orders of the defined types were placed in this encounter. Orders Placed:  Orders Placed This Encounter   Procedures    CT CHEST WO CONTRAST     Standing Status:   Future     Standing Expiration Date:   5/28/2020    CT ABDOMEN PELVIS WO CONTRAST Additional Contrast? Oral     Standing Status:   Future     Standing Expiration Date:   5/28/2020     Order Specific Question:   Additional Contrast?     Answer:   Oral     Order Specific Question:   Reason for exam:     Answer:   bladder cancer        Thank you for allowing me to participate in the care of this patient. I will keep you updated on this patient's follow up and I look forward to serving you and your patients again in the future.         Elyse Topete MD

## 2019-05-28 NOTE — PROGRESS NOTES
[facility]  [facility] [place]  [address]  [address]  [address] [facility] [address]  Dept: [address] [facility] Urology Office Note - Established    Patient:  Vincent Sweeney  YOB: 1945  Date: 5/28/2019    The patient is a 75 y.o. male who presents todayfor evaluation of the following problems:   Chief Complaint   Patient presents with    Results     bladder bx, still having some lower abdominal pain       HPI  He is here after TURBT. He has a h/o high grade T1 disease. Recent TURBT shows high grade muscle invasive disease. He has not had any recent hematuria. His dysuria is improving. He has a good stream and feels like he is emptying his bladder. Summary of old records: N/A    Additional History: N/A    Procedures Today: N/A    Urinalysis today:  No results found for this visit on 05/28/19.   Last several PSA's:  Lab Results   Component Value Date    PSA 0.97 01/05/2018     Last total testosterone:  No results found for: TESTOSTERONE    AUA Symptom Score (5/28/2019):                               Last BUN and creatinine:  Lab Results   Component Value Date    BUN 32 (H) 05/14/2019     Lab Results   Component Value Date    CREATININE 1.31 (H) 05/14/2019       Additional Lab/Culture results: bladder histology    Imaging Reviewed during this Office Visit: none  (results were independently reviewed by physician and radiology report verified)    PAST MEDICAL, FAMILY AND SOCIAL HISTORY UPDATE:  Past Medical History:   Diagnosis Date    AAA (abdominal aortic aneurysm) ([address]     Dr Floresita Shaffer, repaired    CAD (coronary artery disease)     Dr Alvin Santiago [facility])     bladder    Carotid artery stenosis     Central sleep apnea 10/16/2017    CHF (congestive heart failure) ([address]     Colon polyps     Dr Sánchez Seed DDD (degenerative disc disease), lumbar 11/3/2015    Diabetic nephropathy with proteinuria ([address] 11/20/2015    Full dentures     Upper only    GERD Smoker    Packs/day: 0.50    Years: 50.00    Pack years: 25.00    Types: Cigarettes    Start date: 1867   Smokeless Tobacco Never Used     (Ifpatient a smoker, smoking cessation counseling offered)    Social History     Substance and Sexual Activity   Alcohol Use No       REVIEW OF SYSTEMS:  Review of Systems    Physical Exam:      Vitals:    05/28/19 1534   BP: 124/77   Pulse: 62   Temp: 98.4 °F (36.9 °C)     There is no height or weight on file to calculate BMI. Patient is a 75 y.o. male in no acute distress and alert and oriented to person, place and time. Physical Exam  Constitutional: Patient in no acute distress. Neuro: Alert and oriented to person, place and time. Psych: Mood normal, affect normal  Skin: No rash noted  Lungs: Respiratory effort is normal  Cardiovascular: Warm & Pink  Abdomen: Soft, non-tender, non-distended with no CVA,  No flank tenderness,  Or hepatosplenomegaly   Lymphatics: No palpablelymphadenopathy. Bladder non-tender and not distended. Musculoskeletal: Normal gait and station      Assessment and Plan      1. 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 Repeat imaging needed for metastatic evaluation. Will need radical cystoprostatectomy with Ileal conduit. Not interested in chemo. Will follow up after imaging done to re-visit surgery vs XRT. No follow-ups on file. Prescriptions Ordered:  No orders of the defined types were placed in this encounter.     Orders Placed:  Orders Placed This Encounter   Procedures    CT CHEST WO CONTRAST     Standing Status:   Future     Standing Expiration Date:   5/28/2020    CT ABDOMEN PELVIS WO CONTRAST Additional Contrast? Oral     Standing Status:   Future     Standing Expiration Date:   5/28/2020     Order Specific Question:   Additional Contrast?     Answer:   Oral     Order Specific Question:   Reason for exam: Answer:   bladder cancer           Marysol Galvan MD    Agree with the ROS entered by the MA.

## 2019-05-28 NOTE — PROGRESS NOTES
Review of Systems   Constitutional: Negative for activity change, chills and fever. Eyes: Negative for pain, redness and visual disturbance. Respiratory: Negative for cough, shortness of breath and wheezing. Cardiovascular: Negative for chest pain and leg swelling. Gastrointestinal: Positive for abdominal pain. Negative for nausea and vomiting. Genitourinary: Negative for difficulty urinating, discharge, dysuria, flank pain, frequency, hematuria, scrotal swelling and testicular pain. Musculoskeletal: Negative for back pain, joint swelling and myalgias. Skin: Negative for color change and rash. Neurological: Negative for dizziness, tremors and numbness. Hematological: Negative for adenopathy. Bruises/bleeds easily.

## 2019-05-30 ENCOUNTER — HOSPITAL ENCOUNTER (OUTPATIENT)
Dept: CT IMAGING | Age: 74
Discharge: HOME | End: 2019-06-01
Payer: MEDICARE

## 2019-05-30 DIAGNOSIS — E11.21 TYPE II DIABETES MELLITUS WITH NEPHROPATHY (HCC): ICD-10-CM

## 2019-05-30 DIAGNOSIS — C67.1 MALIGNANT NEOPLASM OF DOME OF URINARY BLADDER (HCC): ICD-10-CM

## 2019-05-30 LAB
EKG ATRIAL RATE: 56 BPM
EKG P AXIS: 53 DEGREES
EKG P-R INTERVAL: 146 MS
EKG Q-T INTERVAL: 424 MS
EKG QRS DURATION: 86 MS
EKG QTC CALCULATION (BAZETT): 409 MS
EKG R AXIS: 7 DEGREES
EKG T AXIS: 49 DEGREES
EKG VENTRICULAR RATE: 56 BPM

## 2019-05-30 PROCEDURE — 6360000004 HC RX CONTRAST MEDICATION: Performed by: UROLOGY

## 2019-05-30 PROCEDURE — 74176 CT ABD & PELVIS W/O CONTRAST: CPT

## 2019-05-30 RX ORDER — LANCETS 28 GAUGE
EACH MISCELLANEOUS
Qty: 300 EACH | Refills: 1 | Status: SHIPPED | OUTPATIENT
Start: 2019-05-30 | End: 2020-01-06 | Stop reason: SDUPTHER

## 2019-05-30 RX ORDER — ISOSORBIDE MONONITRATE 60 MG/1
TABLET, EXTENDED RELEASE ORAL
Qty: 90 TABLET | Refills: 0 | Status: SHIPPED | OUTPATIENT
Start: 2019-05-30 | End: 2019-08-26 | Stop reason: SDUPTHER

## 2019-05-30 RX ADMIN — IOHEXOL 50 ML: 240 INJECTION, SOLUTION INTRATHECAL; INTRAVASCULAR; INTRAVENOUS; ORAL at 14:14

## 2019-05-30 NOTE — TELEPHONE ENCOUNTER
Last OV 02/25/2019    Health Maintenance   Topic Date Due    DTaP/Tdap/Td vaccine (1 - Tdap) 11/25/1963    Shingles Vaccine (2 of 3) 06/25/2015    Diabetic retinal exam  04/12/2017    Diabetic foot exam  02/01/2018    Lipid screen  08/01/2019    Flu vaccine (Season Ended) 09/01/2019    A1C test (Diabetic or Prediabetic)  02/25/2020    Potassium monitoring  05/14/2020    Creatinine monitoring  05/14/2020    Colon cancer screen colonoscopy  05/13/2021    Pneumococcal 65+ years Vaccine  Completed             (applicable per patient's age: Cancer Screenings, Depression Screening, Fall Risk Screening, Immunizations)    Hemoglobin A1C (%)   Date Value   02/25/2019 6.3   08/22/2018 6.5   10/16/2017 6.2     LDL Cholesterol (mg/dL)   Date Value   08/01/2018 61     LDL Calculated (mg/dL)   Date Value   11/18/2015 74     AST (U/L)   Date Value   09/04/2018 15     ALT (U/L)   Date Value   09/04/2018 12     BUN (mg/dL)   Date Value   05/14/2019 32 (H)      (goal A1C is < 7)   (goal LDL is <100) need 30-50% reduction from baseline     BP Readings from Last 3 Encounters:   05/28/19 124/77   05/22/19 134/66   05/21/19 134/83    (goal /80)      All Future Testing planned in [facility]:  Lab Frequency Next Occurrence   Lipid Panel Once 10/13/2018   CBC Once 10/13/2018   Comprehensive Metabolic Panel Once 81/07/7989   Hemoglobin A1C Once 10/13/2018   CBC Once 05/28/2019   Comprehensive Metabolic Panel Once 36/95/6013   Lipid Panel Once 05/28/2019   TSH with Reflex Once 05/28/2019   Hemoglobin A1C Once 05/28/2019   Iron And TIBC Once 02/25/2019       Next Visit Date:  Future Appointments   Date Time Provider Diandra Horton   6/13/2019  8:30 AM MD Patricia Loredo [id number]   8/12/2019  8:20 AM JOSE Evans - CNP [facility]            Patient Active Problem List:     AAA (abdominal aortic aneurysm) ([address]     Hypertension     Hyperlipidemia     OA (osteoarthritis) of hip     Colon polyps Gout     GERD (gastroesophageal reflux disease)     Oropharyngeal dysphagia     Post laminectomy syndrome     Tobacco use     Ventral hernia     Obese     Chronic back pain sees Dr Branden Frausto     DDD (degenerative disc disease), lumbar     Diabetic nephropathy with proteinuria ([address]     Type 2 diabetes with nephropathy (HCC)     Mitral regurgitation     Hx of CABG     Macrocytosis without anemia     Coronary artery disease involving native coronary artery of native heart without angina pectoris     Ear lesion     Chondrodermatitis nodularis chronica helicis     Actinic keratosis     Neoplasm of uncertain behavior of skin     CHF (congestive heart failure) (HCC)     CAD (coronary artery disease)     Obesity (BMI 30-39. 9)     History of congestive heart failure     Central sleep apnea

## 2019-06-13 ENCOUNTER — OFFICE VISIT (OUTPATIENT)
Dept: UROLOGY | Age: 74
End: 2019-06-13
Payer: MEDICARE

## 2019-06-13 VITALS
SYSTOLIC BLOOD PRESSURE: 113 MMHG | DIASTOLIC BLOOD PRESSURE: 66 MMHG | HEIGHT: 71 IN | WEIGHT: 242.2 LBS | BODY MASS INDEX: 33.91 KG/M2 | HEART RATE: 63 BPM | TEMPERATURE: 97.9 F

## 2019-06-13 DIAGNOSIS — C67.1 CANCER OF DOME OF URINARY BLADDER (HCC): ICD-10-CM

## 2019-06-13 DIAGNOSIS — C67.1 MALIGNANT NEOPLASM OF DOME OF URINARY BLADDER (HCC): Primary | ICD-10-CM

## 2019-06-13 PROCEDURE — G8598 ASA/ANTIPLAT THER USED: HCPCS | Performed by: UROLOGY

## 2019-06-13 PROCEDURE — 1123F ACP DISCUSS/DSCN MKR DOCD: CPT | Performed by: UROLOGY

## 2019-06-13 PROCEDURE — G8417 CALC BMI ABV UP PARAM F/U: HCPCS | Performed by: UROLOGY

## 2019-06-13 PROCEDURE — 3017F COLORECTAL CA SCREEN DOC REV: CPT | Performed by: UROLOGY

## 2019-06-13 PROCEDURE — 99214 OFFICE O/P EST MOD 30 MIN: CPT | Performed by: UROLOGY

## 2019-06-13 PROCEDURE — 4040F PNEUMOC VAC/ADMIN/RCVD: CPT | Performed by: UROLOGY

## 2019-06-13 PROCEDURE — G8427 DOCREV CUR MEDS BY ELIG CLIN: HCPCS | Performed by: UROLOGY

## 2019-06-13 PROCEDURE — 4004F PT TOBACCO SCREEN RCVD TLK: CPT | Performed by: UROLOGY

## 2019-06-13 ASSESSMENT — ENCOUNTER SYMPTOMS
NAUSEA: 0
CONSTIPATION: 1
ABDOMINAL PAIN: 0
DIARRHEA: 0
WHEEZING: 0
SHORTNESS OF BREATH: 0
COUGH: 0
EYE REDNESS: 0
VOMITING: 0
EYE PAIN: 0
BACK PAIN: 1

## 2019-06-13 NOTE — PROGRESS NOTES
[facility]  [facility] UROLOGY SPECIALISTS - [place]  [address]  [address]  [address] [facility] [address]  Dept: [address] [facility] Urology Office Note - Established    Patient:  Yamilxe Shah  YOB: 1945  Date: 6/13/2019    The patient is a 75 y.o. male who presents todayfor evaluation of the following problems:   Chief Complaint   Patient presents with    Other     Discuss cystectomy       HPI  Here to discuss cystectomy, has mm invasive disease. Has had AAA in past, had hernia repair and mesh done. No blood thinners, urinating well. No hematuria    Summary of old records: N/A    Additional History: N/A    Procedures Today: N/A    Urinalysis today:  No results found for this visit on 06/13/19.   Last several PSA's:  Lab Results   Component Value Date    PSA 0.97 01/05/2018     Last total testosterone:  No results found for: TESTOSTERONE    AUA Symptom Score (6/13/2019):  INCOMPLETE EMPTYING: How often have you had the sensation of not emptying your bladder?: Not at all  FREQUENCY: How often do you have to urinate less than every two hours?: Almost always(only taking water pill every 30 mins)  INTERMITTENCY: How often have you found you stopped and started again several times when you urinated?: Not at all  URGENCY: How often have you found it difficult to postpone urination?: Not at all  WEAK STREAM: How often have you had a weak urinary stream?: Not at all  STRAINING: How often have you had to strain to start  urination?: Not at all  NOCTURIA: How many times did you typically get up at night to uriniate?: 2 Times  TOTAL I-PSS SCORE[de-identified] 7  How would you feel if you were to spend the rest of your life with your urinary condition?: Mostly Satisfied    Last BUN and creatinine:  Lab Results   Component Value Date    BUN 32 (H) 05/14/2019     Lab Results   Component Value Date    CREATININE 1.31 (H) 05/14/2019       Additional Lab/Culture results: none    Imaging Reviewed during this Office Visit: none  (results were independently reviewed by physician and radiology report verified)    PAST MEDICAL, FAMILY AND SOCIAL HISTORY UPDATE:  Past Medical History:   Diagnosis Date    AAA (abdominal aortic aneurysm) ([address]     Dr Mitra Simon, repaired    CAD (coronary artery disease)     Dr Tommie Valentino [facility])     bladder    Carotid artery stenosis     Central sleep apnea 10/16/2017    CHF (congestive heart failure) (HCC)     Colon polyps     Dr Max Nguyen DDD (degenerative disc disease), lumbar 11/3/2015    Diabetic nephropathy with proteinuria ([address] 11/20/2015    Full dentures     Upper only    GERD (gastroesophageal reflux disease) 7/10/2014    Gout 2/7/2014    Heart attack ([address] 1984    Hx of CABG 12/9/2015    Hyperlipidemia     Hypertension     On Rx.     Mitral regurgitation 12/9/2015    Oropharyngeal dysphagia 7/10/2014    Post laminectomy syndrome 9/24/2014    Tobacco use 11/25/2014    Type 2 diabetes with nephropathy ([address] 11/20/2015    Wears glasses      Past Surgical History:   Procedure Laterality Date    ABDOMINAL AORTIC ANEURYSM REPAIR  12/02    ARTERIAL BYPASS SURGRY  1984    Double    BACK SURGERY  2000, 2003    lumbar    CARDIAC SURGERY      cabg, 12/84    COLONOSCOPY      CORONARY ARTERY BYPASS GRAFT  12/1984    CYSTOSCOPY  02/05/2018    RT RETROGRADE TUR-BT WITH MITOMYCIN INSTILLATION    CYSTOSCOPY N/A 5/21/2019    CYSTOSCOPY TRANSURETHRAL RESECTION BLADDER - CYSTO TUR BLADDER TUMOR performed by Vinh Ramos MD at [address] Right 2017    cyst removal     HERNIA REPAIR      ventral    HIATAL HERNIA REPAIR  12/1980    Hiatal    KNEE ARTHROSCOPY Left 2005    KNEE ARTHROSCOPY Right 2006    KNEE SURGERY Left 2008    MALIGNANT SKIN LESION EXCISION Right 04/27/2017    Dr. Zora Mccoy  2004    abdominal muscle repair by Dr. Kwasi Valverde Left 2012    TX CYSTOURETHROSCOPY,FULGUR .5-2CM LESN N/A 2/5/2018    CYSTO, BILATERAL RETROGRADE PYELOGRAM, TUR BLADDER TUMOR WITH GYRUS INSTALLATION OF MITOMYCIN performed by Josee Alvarado MD at [address] TUMOR REMOVAL Right 2009    neck, benign    VENTRAL HERNIA REPAIR Left 10/1997    Ventral    WRIST GANGLION EXCISION Left 2008     Family History   Problem Relation Age of Onset    Breast Cancer Mother     High Blood Pressure Father     Heart Disease Father     Stroke Father     Heart Disease Brother     Heart Surgery Brother         Bypass     Outpatient Medications Marked as Taking for the 6/13/19 encounter (Office Visit) with Gilberto Jay MD   Medication Sig Dispense Refill    isosorbide mononitrate (IMDUR) 60 MG extended release tablet TAKE ONE TABLET BY MOUTH DAILY 90 tablet 0    FREESTYLE LANCETS MISC USE THREE DAILY TO TEST BLOOD SUGAR 300 each 1    furosemide (LASIX) 80 MG tablet TAKE ONE TABLET BY MOUTH DAILY 90 tablet 2    aspirin 81 MG tablet Take 81 mg by mouth daily      RANEXA 500 MG extended release tablet TAKE ONE TABLET BY MOUTH TWICE A  tablet 0    metFORMIN (GLUCOPHAGE) 500 MG tablet TAKE TWO TABLETS BY MOUTH TWICE A DAY WITH MEALS 360 tablet 0    morphine (MS CONTIN) 30 MG extended release tablet Take 30 mg by mouth 3 times daily.       DULERA 100-5 MCG/ACT inhaler INHALE TWO PUFFS BY MOUTH TWICE A DAY 1 Inhaler 5    captopril (CAPOTEN) 25 MG tablet TAKE ONE TABLET BY MOUTH THREE TIMES A  tablet 3    lansoprazole (PREVACID) 30 MG delayed release capsule TAKE ONE CAPSULE BY MOUTH DAILY 90 capsule 3    diphenhydrAMINE-APAP, sleep, (TYLENOL PM EXTRA STRENGTH)  MG tablet Take 1 tablet by mouth nightly as needed for Sleep 14 tablet 0    atorvastatin (LIPITOR) 40 MG tablet TAKE ONE TABLET BY MOUTH DAILY 90 tablet 3    potassium chloride (KLOR-CON M) 20 MEQ extended release tablet TAKE ONE TABLET BY MOUTH DAILY 90 tablet 3    atenolol (TENORMIN) 25 MG tablet TAKE ONE TABLET BY MOUTH DAILY 90 tablet 3    allopurinol (ZYLOPRIM) 100 MG tablet TAKE ONE TABLET BY MOUTH DAILY 90 tablet 3    Magnesium Oxide 500 MG TABS Take 500 mg by mouth daily 90 tablet 2    Glucose Blood (BLOOD GLUCOSE TEST STRIPS) STRP Free Style Lite, Use 3 daily. Insulin Dependent 300 strip 5    ondansetron (ZOFRAN) 8 MG tablet Take 1 tablet by mouth every 12 hours as needed for Nausea or Vomiting 60 tablet 2    Blood Glucose Monitoring Suppl ROMY Glucometer- Free Style Lite 1 Device 0    Lancet Device MISC 1 Device by [employer]. (Med.Supl.;Non-Drugs) route 3 times daily To check blood sugars. Free Style Lite 1 Device 0       Baclofen; Methadone; and Iodides  Social History     Tobacco Use   Smoking Status Current Every Day Smoker    Packs/day: 0.50    Years: 50.00    Pack years: 25.00    Types: Cigarettes    Start date: 1867   Smokeless Tobacco Never Used     (Ifpatient a smoker, smoking cessation counseling offered)    Social History     Substance and Sexual Activity   Alcohol Use No       REVIEW OF SYSTEMS:  Review of Systems    Physical Exam:      Vitals:    06/13/19 0818   BP: 113/66   Pulse: 63   Temp: 97.9 °F (36.6 °C)     Body mass index is 33.78 kg/m². Patient is a 75 y.o. male in no acute distress and alert and oriented to person, place and time. Physical Exam  Constitutional: Patient in no acute distress. Neuro: Alert and oriented to person, place and time. Psych: Mood normal, affect normal  Skin: No rash noted  HEENT: Head: Normocephalic andatraumatic  Conjunctivae and EOM are normal. Pupils are equal, round  Nose:Normal  Right External Ear: Normal; Left External Ear: Normal  Mouth: Mucosa Moist  Neck: Supple  Lungs: Respiratory effort is normal  Cardiovascular: Warm & Pink  Abdomen: Soft, non-tender, non-distended with no CVA,  No flank tenderness,  Or hepatosplenomegaly   Lymphatics: No palpablelymphadenopathy. Assessment and Plan      1.  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 Prescriptions Ordered:  No orders of the defined types were placed in this encounter. Orders Placed:  No orders of the defined types were placed in this encounter. [place] MD    Agree with the ROS entered by the MA.

## 2019-06-13 NOTE — PROGRESS NOTES
Review of Systems   Constitutional: Negative for appetite change, chills and fatigue. Eyes: Negative for pain, redness and visual disturbance. Respiratory: Negative for cough, shortness of breath and wheezing. Cardiovascular: Negative for chest pain and leg swelling. Gastrointestinal: Positive for constipation. Negative for abdominal pain, diarrhea, nausea and vomiting. Genitourinary: Negative for difficulty urinating, dysuria, flank pain, frequency, hematuria and urgency. Musculoskeletal: Positive for back pain. Negative for joint swelling and myalgias. Skin: Negative for rash and wound. Neurological: Negative for dizziness, weakness and numbness. Hematological: Does not bruise/bleed easily.

## 2019-06-14 DIAGNOSIS — Z87.19 H/O HERNIA REPAIR: Primary | ICD-10-CM

## 2019-06-14 DIAGNOSIS — Z98.890 H/O HERNIA REPAIR: Primary | ICD-10-CM

## 2019-06-17 ENCOUNTER — HOSPITAL ENCOUNTER (OUTPATIENT)
Dept: PREADMISSION TESTING | Age: 74
Discharge: HOME | End: 2019-06-21
Payer: MEDICARE

## 2019-06-17 VITALS
SYSTOLIC BLOOD PRESSURE: 110 MMHG | TEMPERATURE: 97.8 F | HEART RATE: 71 BPM | DIASTOLIC BLOOD PRESSURE: 77 MMHG | HEIGHT: 71 IN | WEIGHT: 234 LBS | OXYGEN SATURATION: 98 % | BODY MASS INDEX: 32.76 KG/M2 | RESPIRATION RATE: 16 BRPM

## 2019-06-17 LAB
ABSOLUTE EOS #: 0.2 K/UL (ref 0–0.4)
ABSOLUTE IMMATURE GRANULOCYTE: ABNORMAL K/UL (ref 0–0.3)
ABSOLUTE LYMPH #: 2.1 K/UL (ref 1–4.8)
ABSOLUTE MONO #: 0.6 K/UL (ref 0.1–1.3)
ANION GAP SERPL CALCULATED.3IONS-SCNC: 15 MMOL/L (ref 9–17)
BASOPHILS # BLD: 1 % (ref 0–2)
BASOPHILS ABSOLUTE: 0 K/UL (ref 0–0.2)
BUN BLDV-MCNC: 24 MG/DL (ref 8–23)
BUN/CREAT BLD: ABNORMAL (ref 9–20)
CALCIUM SERPL-MCNC: 9.1 MG/DL (ref 8.6–10.4)
CHLORIDE BLD-SCNC: 102 MMOL/L (ref 98–107)
CO2: 23 MMOL/L (ref 20–31)
CREAT SERPL-MCNC: 1.34 MG/DL (ref 0.7–1.2)
DIFFERENTIAL TYPE: ABNORMAL
EOSINOPHILS RELATIVE PERCENT: 3 % (ref 0–4)
GFR AFRICAN AMERICAN: >60 ML/MIN
GFR NON-AFRICAN AMERICAN: 52 ML/MIN
GFR SERPL CREATININE-BSD FRML MDRD: ABNORMAL ML/MIN/{1.73_M2}
GFR SERPL CREATININE-BSD FRML MDRD: ABNORMAL ML/MIN/{1.73_M2}
GLUCOSE BLD-MCNC: 167 MG/DL (ref 70–99)
HCT VFR BLD CALC: 34.3 % (ref 41–53)
HEMOGLOBIN: 11.6 G/DL (ref 13.5–17.5)
IMMATURE GRANULOCYTES: ABNORMAL %
LYMPHOCYTES # BLD: 25 % (ref 24–44)
MCH RBC QN AUTO: 34.2 PG (ref 26–34)
MCHC RBC AUTO-ENTMCNC: 33.7 G/DL (ref 31–37)
MCV RBC AUTO: 101.5 FL (ref 80–100)
MONOCYTES # BLD: 7 % (ref 1–7)
NRBC AUTOMATED: ABNORMAL PER 100 WBC
PDW BLD-RTO: 16.4 % (ref 11.5–14.9)
PLATELET # BLD: 279 K/UL (ref 150–450)
PLATELET ESTIMATE: ABNORMAL
PMV BLD AUTO: 7.7 FL (ref 6–12)
POTASSIUM SERPL-SCNC: 4.8 MMOL/L (ref 3.7–5.3)
RBC # BLD: 3.38 M/UL (ref 4.5–5.9)
RBC # BLD: ABNORMAL 10*6/UL
SEG NEUTROPHILS: 64 % (ref 36–66)
SEGMENTED NEUTROPHILS ABSOLUTE COUNT: 5.5 K/UL (ref 1.3–9.1)
SODIUM BLD-SCNC: 140 MMOL/L (ref 135–144)
WBC # BLD: 8.4 K/UL (ref 3.5–11)
WBC # BLD: ABNORMAL 10*3/UL

## 2019-06-17 PROCEDURE — 80048 BASIC METABOLIC PNL TOTAL CA: CPT

## 2019-06-17 PROCEDURE — 85025 COMPLETE CBC W/AUTO DIFF WBC: CPT

## 2019-06-17 PROCEDURE — 36415 COLL VENOUS BLD VENIPUNCTURE: CPT

## 2019-06-17 RX ORDER — M-VIT,TX,IRON,MINS/CALC/FOLIC 27MG-0.4MG
1 TABLET ORAL DAILY
COMMUNITY
End: 2020-05-07 | Stop reason: ALTCHOICE

## 2019-06-17 ASSESSMENT — ENCOUNTER SYMPTOMS
EYE REDNESS: 0
APNEA: 0
COUGH: 0
SINUS PRESSURE: 0
FACIAL SWELLING: 0
SHORTNESS OF BREATH: 0
EYE DISCHARGE: 0
EYE PAIN: 0

## 2019-06-17 ASSESSMENT — PAIN SCALES - GENERAL: PAINLEVEL_OUTOF10: 5

## 2019-06-17 ASSESSMENT — PAIN DESCRIPTION - PAIN TYPE: TYPE: CHRONIC PAIN

## 2019-06-17 ASSESSMENT — PAIN DESCRIPTION - LOCATION: LOCATION: BACK

## 2019-06-17 NOTE — PROGRESS NOTES
Pt states he will call his cardiologist to see if he needs a new cardiac clearance.  He also had this procedure done late may 2019

## 2019-06-17 NOTE — H&P
HISTORY and [address]         NAME:  Romario Bryant  MRN: [id number]   YOB: 1945   Date: 6/17/2019   Age: 75 y.o. Gender: male       COMPLAINT AND PRESENT HISTORY:    Anabel Muñiz is a 75 yr old male who has bladder cancer,  He is here or Pre Admission Testing He had a cysto 2 months ago and had a new bladder lesion, That showed invasice high grade Urothelial carcinoma with invasion in muscle. Now he is having a second scope to look at that area again,   He ill have a TURP and Cysto June 27 for a re evaluation prior to more extensive treatment    He has no blood in urine  He has nocturia 2x night, Has no abd pain    He had AAA repair in 2005. Dr Mitra Simon  He had CABG in 1984 Cardiology is Dr Ott Service     He has DM  Glucoses have been running 100-105. He has an incisonal hernia in abd,     He is  Retired,     DIAGNOSTIC RESULTS   Radiology:     EKG:    Labs:  U/A:      PAST MEDICAL HISTORY     Past Medical History:   Diagnosis Date    AAA (abdominal aortic aneurysm) ([address]     Dr Mitra Simon, repaired    CAD (coronary artery disease)     Dr Tommie Valentino [facility])     bladder    Carotid artery stenosis     Central sleep apnea 10/16/2017    CHF (congestive heart failure) (HCC)     Colon polyps     Dr Max Nguyen DDD (degenerative disc disease), lumbar 11/3/2015    Diabetic nephropathy with proteinuria ([address] 11/20/2015    Full dentures     Upper only    GERD (gastroesophageal reflux disease) 7/10/2014    Gout 2/7/2014    Heart attack ([address] 1984    Hx of CABG 12/9/2015    Hyperlipidemia     Hypertension     On Rx.     Mitral regurgitation 12/9/2015    Oropharyngeal dysphagia 7/10/2014    Post laminectomy syndrome 9/24/2014    Tobacco use 11/25/2014    Type 2 diabetes with nephropathy ([address] 11/20/2015    Wears glasses        Pt denies any history of Diabetes mellitus type 2, hypertension, stroke, heart disease, COPD, Asthma, GERD, HLD, Cancer, Seizures,Thyroid disease, Kidney Disease, Hepatitis, TB, Psychiatric Disorders or Substance abuse.     SURGICAL HISTORY       Past Surgical History:   Procedure Laterality Date    ABDOMINAL AORTIC ANEURYSM REPAIR  12/02    ARTERIAL BYPASS SURGRY  1984    Double    BACK SURGERY  2000, 2003    lumbar    CARDIAC SURGERY      cabg, 12/84    COLONOSCOPY      CORONARY ARTERY BYPASS GRAFT  12/1984    CYSTOSCOPY  02/05/2018    RT RETROGRADE TUR-BT WITH MITOMYCIN INSTILLATION    CYSTOSCOPY N/A 5/21/2019    CYSTOSCOPY TRANSURETHRAL RESECTION BLADDER - CYSTO TUR BLADDER TUMOR performed by Manuel Barclay MD at [address] Right 2017    cyst removal     HERNIA REPAIR      ventral    HIATAL HERNIA REPAIR  12/1980    Hiatal    KNEE ARTHROSCOPY Left 2005    KNEE ARTHROSCOPY Right 2006    KNEE SURGERY Left 2008    MALIGNANT SKIN LESION EXCISION Right 04/27/2017    Dr. Nishi Celaya  2004    abdominal muscle repair by Dr. Hardin Mini Left 2012    ME CYSTOURETHROSCOPY,FULGUR .5-2CM LESN N/A 2/5/2018    CYSTO, BILATERAL RETROGRADE PYELOGRAM, TUR BLADDER TUMOR WITH GYRUS INSTALLATION OF MITOMYCIN performed by Manuel Barclay MD at [address] [place] [address] Rd TUMOR REMOVAL Right 2009    neck, benign    VENTRAL HERNIA REPAIR Left 10/1997    Ventral    WRIST GANGLION EXCISION Left 2008       FAMILY HISTORY       Family History   Problem Relation Age of Onset    Breast Cancer Mother     High Blood Pressure Father     Heart Disease Father     Stroke Father     Heart Disease Brother     Heart Surgery Brother         Bypass       SOCIAL HISTORY       Social History     Socioeconomic History    Marital status:      Spouse name: Beck Mercado Number of children: 0    Years of education: Not on file    Highest education level: Not on file   Occupational History    Not on file   Social Needs    Financial resource strain: Not on file    Food insecurity:     Worry: Not release tablet Take 30 mg by mouth 3 times daily.  DULERA 100-5 MCG/ACT inhaler INHALE TWO PUFFS BY MOUTH TWICE A DAY 1 Inhaler 5    captopril (CAPOTEN) 25 MG tablet TAKE ONE TABLET BY MOUTH THREE TIMES A  tablet 3    lansoprazole (PREVACID) 30 MG delayed release capsule TAKE ONE CAPSULE BY MOUTH DAILY 90 capsule 3    atorvastatin (LIPITOR) 40 MG tablet TAKE ONE TABLET BY MOUTH DAILY 90 tablet 3    potassium chloride (KLOR-CON M) 20 MEQ extended release tablet TAKE ONE TABLET BY MOUTH DAILY 90 tablet 3    atenolol (TENORMIN) 25 MG tablet TAKE ONE TABLET BY MOUTH DAILY 90 tablet 3    allopurinol (ZYLOPRIM) 100 MG tablet TAKE ONE TABLET BY MOUTH DAILY 90 tablet 3    Magnesium Oxide 500 MG TABS Take 500 mg by mouth daily 90 tablet 2    ondansetron (ZOFRAN) 8 MG tablet Take 1 tablet by mouth every 12 hours as needed for Nausea or Vomiting 60 tablet 2    FREESTYLE LANCETS MISC USE THREE DAILY TO TEST BLOOD SUGAR 300 each 1    diphenhydrAMINE-APAP, sleep, (TYLENOL PM EXTRA STRENGTH)  MG tablet Take 1 tablet by mouth nightly as needed for Sleep 14 tablet 0    Glucose Blood (BLOOD GLUCOSE TEST STRIPS) STRP Free Style Lite, Use 3 daily. Insulin Dependent 300 strip 5    Blood Glucose Monitoring Suppl ROMY Glucometer- Free Style Lite 1 Device 0    Lancet Device MISC 1 Device by [employer]. (Med.Supl.;Non-Drugs) route 3 times daily To check blood sugars. Free Style Lite 1 Device 0     No current facility-administered medications on file prior to encounter. Review of Systems   Constitutional: Negative for activity change, diaphoresis and fatigue. HENT: Negative for congestion, facial swelling, nosebleeds, sinus pressure and sneezing. Eyes: Negative for pain, discharge and redness. Respiratory: Negative for apnea, cough and shortness of breath. Cardiovascular: Negative for chest pain and leg swelling. Endocrine: Negative for cold intolerance, polydipsia and polyphagia. Musculoskeletal: Positive for arthralgias. Walks with a cane  Legs often weak   Denies falls   Skin: Negative. Allergic/Immunologic: Negative for environmental allergies and food allergies. Neurological: Negative for dizziness, seizures, syncope and speech difficulty. Hematological: Negative for adenopathy. Does not bruise/bleed easily. Psychiatric/Behavioral: Negative for agitation and hallucinations. The patient is not nervous/anxious. See HPI. GENERAL PHYSICAL EXAM:         Vitals: /77   Pulse 71   Temp 97.8 °F (36.6 °C) (Oral)   Resp 16   Ht 5' 11\" (1.803 m)   Wt 234 lb (106.1 kg)   SpO2 98%   BMI 32.64 kg/m²  Body mass index is 32.64 kg/m². GENERAL APPEARANCE:  Shari Saeed is 75 y.o.,  male, mildly obese,  Alert and has clear speech Walks with cane  Expresses the he will soon have bladder removed and ileouroconduit. He is  First to have chemo for several tx and then radiation     Physical Exam   Constitutional: He appears well-developed and well-nourished. HENT:   Head: Normocephalic. Right Ear: External ear normal.   Left Ear: External ear normal.   Mouth/Throat: Oropharynx is clear and moist.   UPPER AND LOWER DENTURES   Eyes: Pupils are equal, round, and reactive to light. Conjunctivae are normal.   Neck: Normal range of motion. No tracheal deviation present. No thyromegaly present. Cardiovascular: Normal rate and normal heart sounds. No murmur heard. Pulmonary/Chest: Effort normal and breath sounds normal. He has no wheezes. Abdominal: Soft. Bowel sounds are normal. He exhibits no mass. There is no tenderness. Musculoskeletal: Normal range of motion. He exhibits no edema or deformity. Neurological: He is alert. He displays normal reflexes. No cranial nerve deficit. He exhibits normal muscle tone. Skin: Skin is warm. Capillary refill takes less than 2 seconds.    Psychiatric: His behavior is normal. Judgment normal.     PROVISIONAL DIAGNOSES:     Bladder cancer,   For Cysto and TURP  Active Problems:    * No active hospital problems. *  Resolved Problems:    * No resolved hospital problems.  JOSE Chowdhury - CNP on 6/17/2019 at 11:05 AM

## 2019-06-18 ENCOUNTER — TELEPHONE (OUTPATIENT)
Dept: UROLOGY | Age: 74
End: 2019-06-18

## 2019-06-18 ENCOUNTER — ANESTHESIA EVENT (OUTPATIENT)
Dept: OPERATING ROOM | Age: 74
End: 2019-06-18
Payer: MEDICARE

## 2019-06-18 ENCOUNTER — TELEPHONE (OUTPATIENT)
Dept: ONCOLOGY | Age: 74
End: 2019-06-18

## 2019-06-18 RX ORDER — SODIUM CHLORIDE, SODIUM LACTATE, POTASSIUM CHLORIDE, CALCIUM CHLORIDE 600; 310; 30; 20 MG/100ML; MG/100ML; MG/100ML; MG/100ML
INJECTION, SOLUTION INTRAVENOUS CONTINUOUS
Status: CANCELLED | OUTPATIENT
Start: 2019-06-18

## 2019-06-18 RX ORDER — LIDOCAINE HYDROCHLORIDE 10 MG/ML
1 INJECTION, SOLUTION EPIDURAL; INFILTRATION; INTRACAUDAL; PERINEURAL
Status: CANCELLED | OUTPATIENT
Start: 2019-06-18 | End: 2019-06-18

## 2019-06-18 RX ORDER — SODIUM CHLORIDE 0.9 % (FLUSH) 0.9 %
10 SYRINGE (ML) INJECTION PRN
Status: CANCELLED | OUTPATIENT
Start: 2019-06-18

## 2019-06-18 RX ORDER — SODIUM CHLORIDE 0.9 % (FLUSH) 0.9 %
10 SYRINGE (ML) INJECTION EVERY 12 HOURS SCHEDULED
Status: CANCELLED | OUTPATIENT
Start: 2019-06-18

## 2019-06-18 NOTE — TELEPHONE ENCOUNTER
New referral in workque, [place]. Gave pt option to wait to see Dr. Yan Pelayo  or schedule with one of his partners. Pt requested to get in as soon as possible.

## 2019-06-21 ENCOUNTER — INITIAL CONSULT (OUTPATIENT)
Dept: ONCOLOGY | Age: 74
End: 2019-06-21
Payer: MEDICARE

## 2019-06-21 ENCOUNTER — TELEPHONE (OUTPATIENT)
Dept: ONCOLOGY | Age: 74
End: 2019-06-21

## 2019-06-21 VITALS
HEART RATE: 58 BPM | BODY MASS INDEX: 33.52 KG/M2 | SYSTOLIC BLOOD PRESSURE: 113 MMHG | HEIGHT: 71 IN | WEIGHT: 239.4 LBS | DIASTOLIC BLOOD PRESSURE: 70 MMHG | TEMPERATURE: 98 F

## 2019-06-21 DIAGNOSIS — C67.9 MALIGNANT NEOPLASM OF URINARY BLADDER, UNSPECIFIED SITE (HCC): Primary | ICD-10-CM

## 2019-06-21 PROCEDURE — 99205 OFFICE O/P NEW HI 60 MIN: CPT | Performed by: INTERNAL MEDICINE

## 2019-06-21 PROCEDURE — G8417 CALC BMI ABV UP PARAM F/U: HCPCS | Performed by: INTERNAL MEDICINE

## 2019-06-21 PROCEDURE — 99201 HC NEW PT, E/M LEVEL 1: CPT | Performed by: INTERNAL MEDICINE

## 2019-06-21 PROCEDURE — G8427 DOCREV CUR MEDS BY ELIG CLIN: HCPCS | Performed by: INTERNAL MEDICINE

## 2019-06-21 NOTE — TELEPHONE ENCOUNTER
Writer met pt face to face today after Dr. Rafal Andrews asked for writer to navigate. Writer introduce self as their Oncology navigator and provided business cards. Pt informed that I would send out welcome packet to them today, packet sent. Social and financial issues addressed and pt and pt wife denies at this time but does want additional info regarding [facility], pamphlet sent in packet. Will continue to follow pt. PORT sched. For July 1, 11am at [facility],   Bone scan sched today for 6/24,  TURBT procedure sched.  For 6/27  Dr. Bola Jack f/u 7/11

## 2019-06-24 ENCOUNTER — HOSPITAL ENCOUNTER (OUTPATIENT)
Dept: NUCLEAR MEDICINE | Age: 74
Discharge: HOME | End: 2019-06-26
Payer: MEDICARE

## 2019-06-24 ENCOUNTER — HOSPITAL ENCOUNTER (OUTPATIENT)
Age: 74
Discharge: HOME | End: 2019-06-24
Payer: MEDICARE

## 2019-06-24 DIAGNOSIS — C67.9 MALIGNANT NEOPLASM OF URINARY BLADDER, UNSPECIFIED SITE (HCC): ICD-10-CM

## 2019-06-24 LAB
ALBUMIN SERPL-MCNC: 4 G/DL (ref 3.5–5.2)
ALBUMIN/GLOBULIN RATIO: 1 (ref 1–2.5)
ALP BLD-CCNC: 68 U/L (ref 40–129)
ALT SERPL-CCNC: 10 U/L (ref 5–41)
ANION GAP SERPL CALCULATED.3IONS-SCNC: 14 MMOL/L (ref 9–17)
AST SERPL-CCNC: 11 U/L
BILIRUB SERPL-MCNC: 0.36 MG/DL (ref 0.3–1.2)
BUN BLDV-MCNC: 25 MG/DL (ref 8–23)
BUN/CREAT BLD: ABNORMAL (ref 9–20)
CALCIUM SERPL-MCNC: 9.4 MG/DL (ref 8.6–10.4)
CHLORIDE BLD-SCNC: 97 MMOL/L (ref 98–107)
CO2: 26 MMOL/L (ref 20–31)
CREAT SERPL-MCNC: 1.42 MG/DL (ref 0.7–1.2)
FOLATE: >20 NG/ML
GFR AFRICAN AMERICAN: 59 ML/MIN
GFR NON-AFRICAN AMERICAN: 49 ML/MIN
GFR SERPL CREATININE-BSD FRML MDRD: ABNORMAL ML/MIN/{1.73_M2}
GFR SERPL CREATININE-BSD FRML MDRD: ABNORMAL ML/MIN/{1.73_M2}
GLUCOSE BLD-MCNC: 113 MG/DL (ref 70–99)
POTASSIUM SERPL-SCNC: 4.2 MMOL/L (ref 3.7–5.3)
SODIUM BLD-SCNC: 137 MMOL/L (ref 135–144)
TOTAL PROTEIN: 8 G/DL (ref 6.4–8.3)
VITAMIN B-12: 258 PG/ML (ref 232–1245)

## 2019-06-24 PROCEDURE — 82746 ASSAY OF FOLIC ACID SERUM: CPT

## 2019-06-24 PROCEDURE — A9503 TC99M MEDRONATE: HCPCS | Performed by: INTERNAL MEDICINE

## 2019-06-24 PROCEDURE — 3430000000 HC RX DIAGNOSTIC RADIOPHARMACEUTICAL: Performed by: INTERNAL MEDICINE

## 2019-06-24 PROCEDURE — 80053 COMPREHEN METABOLIC PANEL: CPT

## 2019-06-24 PROCEDURE — 2580000003 HC RX 258: Performed by: INTERNAL MEDICINE

## 2019-06-24 PROCEDURE — 78306 BONE IMAGING WHOLE BODY: CPT

## 2019-06-24 PROCEDURE — 36415 COLL VENOUS BLD VENIPUNCTURE: CPT

## 2019-06-24 PROCEDURE — 82607 VITAMIN B-12: CPT

## 2019-06-24 RX ORDER — TC 99M MEDRONATE 20 MG/10ML
25 INJECTION, POWDER, LYOPHILIZED, FOR SOLUTION INTRAVENOUS
Status: COMPLETED | OUTPATIENT
Start: 2019-06-24 | End: 2019-06-24

## 2019-06-24 RX ORDER — SODIUM CHLORIDE 0.9 % (FLUSH) 0.9 %
10 SYRINGE (ML) INJECTION ONCE
Status: COMPLETED | OUTPATIENT
Start: 2019-06-24 | End: 2019-06-24

## 2019-06-24 RX ADMIN — Medication 10 ML: at 08:18

## 2019-06-24 RX ADMIN — TC 99M MEDRONATE 24.6 MILLICURIE: 20 INJECTION, POWDER, LYOPHILIZED, FOR SOLUTION INTRAVENOUS at 07:58

## 2019-06-26 ENCOUNTER — TELEPHONE (OUTPATIENT)
Dept: UROLOGY | Age: 74
End: 2019-06-26

## 2019-06-26 ENCOUNTER — TELEPHONE (OUTPATIENT)
Dept: INFUSION THERAPY | Age: 74
End: 2019-06-26

## 2019-06-27 ENCOUNTER — ANESTHESIA (OUTPATIENT)
Dept: OPERATING ROOM | Age: 74
End: 2019-06-27
Payer: MEDICARE

## 2019-06-27 ENCOUNTER — HOSPITAL ENCOUNTER (OUTPATIENT)
Age: 74
Setting detail: OUTPATIENT SURGERY
Discharge: HOME | End: 2019-06-27
Attending: UROLOGY | Admitting: UROLOGY
Payer: MEDICARE

## 2019-06-27 VITALS
SYSTOLIC BLOOD PRESSURE: 98 MMHG | OXYGEN SATURATION: 96 % | TEMPERATURE: 96 F | DIASTOLIC BLOOD PRESSURE: 65 MMHG | RESPIRATION RATE: 2 BRPM

## 2019-06-27 VITALS
HEIGHT: 71 IN | RESPIRATION RATE: 18 BRPM | WEIGHT: 234 LBS | HEART RATE: 68 BPM | TEMPERATURE: 97 F | DIASTOLIC BLOOD PRESSURE: 72 MMHG | BODY MASS INDEX: 32.76 KG/M2 | SYSTOLIC BLOOD PRESSURE: 123 MMHG | OXYGEN SATURATION: 97 %

## 2019-06-27 DIAGNOSIS — C67.8 MALIGNANT NEOPLASM OF OVERLAPPING SITES OF BLADDER (HCC): Primary | ICD-10-CM

## 2019-06-27 LAB
GLUCOSE BLD-MCNC: 120 MG/DL (ref 75–110)
GLUCOSE BLD-MCNC: 134 MG/DL (ref 75–110)

## 2019-06-27 PROCEDURE — 3700000001 HC ADD 15 MINUTES (ANESTHESIA): Performed by: UROLOGY

## 2019-06-27 PROCEDURE — 6360000002 HC RX W HCPCS: Performed by: UROLOGY

## 2019-06-27 PROCEDURE — 2709999900 HC NON-CHARGEABLE SUPPLY: Performed by: UROLOGY

## 2019-06-27 PROCEDURE — 2580000003 HC RX 258: Performed by: ANESTHESIOLOGY

## 2019-06-27 PROCEDURE — 7100000001 HC PACU RECOVERY - ADDTL 15 MIN: Performed by: UROLOGY

## 2019-06-27 PROCEDURE — 7100000031 HC ASPR PHASE II RECOVERY - ADDTL 15 MIN: Performed by: UROLOGY

## 2019-06-27 PROCEDURE — 2500000003 HC RX 250 WO HCPCS: Performed by: NURSE ANESTHETIST, CERTIFIED REGISTERED

## 2019-06-27 PROCEDURE — 88307 TISSUE EXAM BY PATHOLOGIST: CPT

## 2019-06-27 PROCEDURE — 3700000000 HC ANESTHESIA ATTENDED CARE: Performed by: UROLOGY

## 2019-06-27 PROCEDURE — 7100000000 HC PACU RECOVERY - FIRST 15 MIN: Performed by: UROLOGY

## 2019-06-27 PROCEDURE — 3600000003 HC SURGERY LEVEL 3 BASE: Performed by: UROLOGY

## 2019-06-27 PROCEDURE — 7100000011 HC PHASE II RECOVERY - ADDTL 15 MIN: Performed by: UROLOGY

## 2019-06-27 PROCEDURE — 82947 ASSAY GLUCOSE BLOOD QUANT: CPT

## 2019-06-27 PROCEDURE — 7100000010 HC PHASE II RECOVERY - FIRST 15 MIN: Performed by: UROLOGY

## 2019-06-27 PROCEDURE — 6360000002 HC RX W HCPCS: Performed by: NURSE ANESTHETIST, CERTIFIED REGISTERED

## 2019-06-27 PROCEDURE — 3600000013 HC SURGERY LEVEL 3 ADDTL 15MIN: Performed by: UROLOGY

## 2019-06-27 PROCEDURE — 7100000030 HC ASPR PHASE II RECOVERY - FIRST 15 MIN: Performed by: UROLOGY

## 2019-06-27 RX ORDER — SODIUM CHLORIDE, SODIUM LACTATE, POTASSIUM CHLORIDE, CALCIUM CHLORIDE 600; 310; 30; 20 MG/100ML; MG/100ML; MG/100ML; MG/100ML
INJECTION, SOLUTION INTRAVENOUS CONTINUOUS
Status: DISCONTINUED | OUTPATIENT
Start: 2019-06-27 | End: 2019-06-27 | Stop reason: HOSPADM

## 2019-06-27 RX ORDER — EPHEDRINE SULFATE/0.9% NACL/PF 50 MG/5 ML
SYRINGE (ML) INTRAVENOUS PRN
Status: DISCONTINUED | OUTPATIENT
Start: 2019-06-27 | End: 2019-06-27 | Stop reason: SDUPTHER

## 2019-06-27 RX ORDER — FENTANYL CITRATE 50 UG/ML
50 INJECTION, SOLUTION INTRAMUSCULAR; INTRAVENOUS EVERY 5 MIN PRN
Status: DISCONTINUED | OUTPATIENT
Start: 2019-06-27 | End: 2019-06-27 | Stop reason: HOSPADM

## 2019-06-27 RX ORDER — SODIUM CHLORIDE 0.9 % (FLUSH) 0.9 %
10 SYRINGE (ML) INJECTION PRN
Status: DISCONTINUED | OUTPATIENT
Start: 2019-06-27 | End: 2019-06-27 | Stop reason: HOSPADM

## 2019-06-27 RX ORDER — HYDROCODONE BITARTRATE AND ACETAMINOPHEN 5; 325 MG/1; MG/1
2 TABLET ORAL EVERY 6 HOURS PRN
Qty: 10 TABLET | Refills: 0 | Status: SHIPPED | OUTPATIENT
Start: 2019-06-27 | End: 2019-06-30

## 2019-06-27 RX ORDER — HYDRALAZINE HYDROCHLORIDE 20 MG/ML
5 INJECTION INTRAMUSCULAR; INTRAVENOUS EVERY 10 MIN PRN
Status: DISCONTINUED | OUTPATIENT
Start: 2019-06-27 | End: 2019-06-27 | Stop reason: HOSPADM

## 2019-06-27 RX ORDER — HYDROCODONE BITARTRATE AND ACETAMINOPHEN 5; 325 MG/1; MG/1
1 TABLET ORAL PRN
Status: DISCONTINUED | OUTPATIENT
Start: 2019-06-27 | End: 2019-06-27 | Stop reason: HOSPADM

## 2019-06-27 RX ORDER — LIDOCAINE HYDROCHLORIDE 10 MG/ML
INJECTION, SOLUTION EPIDURAL; INFILTRATION; INTRACAUDAL; PERINEURAL PRN
Status: DISCONTINUED | OUTPATIENT
Start: 2019-06-27 | End: 2019-06-27 | Stop reason: SDUPTHER

## 2019-06-27 RX ORDER — METOCLOPRAMIDE HYDROCHLORIDE 5 MG/ML
10 INJECTION INTRAMUSCULAR; INTRAVENOUS
Status: DISCONTINUED | OUTPATIENT
Start: 2019-06-27 | End: 2019-06-27 | Stop reason: HOSPADM

## 2019-06-27 RX ORDER — LABETALOL 20 MG/4 ML (5 MG/ML) INTRAVENOUS SYRINGE
5 EVERY 10 MIN PRN
Status: DISCONTINUED | OUTPATIENT
Start: 2019-06-27 | End: 2019-06-27 | Stop reason: HOSPADM

## 2019-06-27 RX ORDER — MIDAZOLAM HYDROCHLORIDE 1 MG/ML
INJECTION INTRAMUSCULAR; INTRAVENOUS PRN
Status: DISCONTINUED | OUTPATIENT
Start: 2019-06-27 | End: 2019-06-27 | Stop reason: SDUPTHER

## 2019-06-27 RX ORDER — PROPOFOL 10 MG/ML
INJECTION, EMULSION INTRAVENOUS PRN
Status: DISCONTINUED | OUTPATIENT
Start: 2019-06-27 | End: 2019-06-27 | Stop reason: SDUPTHER

## 2019-06-27 RX ORDER — CEPHALEXIN 500 MG/1
500 CAPSULE ORAL 2 TIMES DAILY
Qty: 10 CAPSULE | Refills: 0 | Status: SHIPPED | OUTPATIENT
Start: 2019-06-27 | End: 2019-07-19 | Stop reason: ALTCHOICE

## 2019-06-27 RX ORDER — FENTANYL CITRATE 50 UG/ML
INJECTION, SOLUTION INTRAMUSCULAR; INTRAVENOUS PRN
Status: DISCONTINUED | OUTPATIENT
Start: 2019-06-27 | End: 2019-06-27 | Stop reason: SDUPTHER

## 2019-06-27 RX ORDER — ONDANSETRON 2 MG/ML
INJECTION INTRAMUSCULAR; INTRAVENOUS PRN
Status: DISCONTINUED | OUTPATIENT
Start: 2019-06-27 | End: 2019-06-27 | Stop reason: SDUPTHER

## 2019-06-27 RX ORDER — FENTANYL CITRATE 50 UG/ML
25 INJECTION, SOLUTION INTRAMUSCULAR; INTRAVENOUS EVERY 5 MIN PRN
Status: DISCONTINUED | OUTPATIENT
Start: 2019-06-27 | End: 2019-06-27 | Stop reason: HOSPADM

## 2019-06-27 RX ORDER — SODIUM CHLORIDE 0.9 % (FLUSH) 0.9 %
10 SYRINGE (ML) INJECTION EVERY 12 HOURS SCHEDULED
Status: DISCONTINUED | OUTPATIENT
Start: 2019-06-27 | End: 2019-06-27 | Stop reason: HOSPADM

## 2019-06-27 RX ORDER — DEXAMETHASONE SODIUM PHOSPHATE 4 MG/ML
INJECTION, SOLUTION INTRA-ARTICULAR; INTRALESIONAL; INTRAMUSCULAR; INTRAVENOUS; SOFT TISSUE PRN
Status: DISCONTINUED | OUTPATIENT
Start: 2019-06-27 | End: 2019-06-27 | Stop reason: SDUPTHER

## 2019-06-27 RX ORDER — LIDOCAINE HYDROCHLORIDE 10 MG/ML
1 INJECTION, SOLUTION EPIDURAL; INFILTRATION; INTRACAUDAL; PERINEURAL
Status: DISCONTINUED | OUTPATIENT
Start: 2019-06-27 | End: 2019-06-27 | Stop reason: HOSPADM

## 2019-06-27 RX ORDER — HYDROCODONE BITARTRATE AND ACETAMINOPHEN 5; 325 MG/1; MG/1
2 TABLET ORAL PRN
Status: DISCONTINUED | OUTPATIENT
Start: 2019-06-27 | End: 2019-06-27 | Stop reason: HOSPADM

## 2019-06-27 RX ORDER — MEPERIDINE HYDROCHLORIDE 50 MG/ML
12.5 INJECTION INTRAMUSCULAR; INTRAVENOUS; SUBCUTANEOUS EVERY 5 MIN PRN
Status: DISCONTINUED | OUTPATIENT
Start: 2019-06-27 | End: 2019-06-27 | Stop reason: HOSPADM

## 2019-06-27 RX ORDER — DIPHENHYDRAMINE HYDROCHLORIDE 50 MG/ML
12.5 INJECTION INTRAMUSCULAR; INTRAVENOUS
Status: DISCONTINUED | OUTPATIENT
Start: 2019-06-27 | End: 2019-06-27 | Stop reason: HOSPADM

## 2019-06-27 RX ORDER — MORPHINE SULFATE 2 MG/ML
2 INJECTION, SOLUTION INTRAMUSCULAR; INTRAVENOUS EVERY 5 MIN PRN
Status: DISCONTINUED | OUTPATIENT
Start: 2019-06-27 | End: 2019-06-27 | Stop reason: HOSPADM

## 2019-06-27 RX ORDER — ONDANSETRON 2 MG/ML
4 INJECTION INTRAMUSCULAR; INTRAVENOUS
Status: DISCONTINUED | OUTPATIENT
Start: 2019-06-27 | End: 2019-06-27 | Stop reason: HOSPADM

## 2019-06-27 RX ADMIN — FENTANYL CITRATE 25 MCG: 50 INJECTION, SOLUTION INTRAMUSCULAR; INTRAVENOUS at 13:18

## 2019-06-27 RX ADMIN — Medication 20 MG: at 13:09

## 2019-06-27 RX ADMIN — ONDANSETRON 4 MG: 2 INJECTION INTRAMUSCULAR; INTRAVENOUS at 13:16

## 2019-06-27 RX ADMIN — PROPOFOL 160 MG: 10 INJECTION, EMULSION INTRAVENOUS at 13:09

## 2019-06-27 RX ADMIN — MIDAZOLAM 2 MG: 1 INJECTION INTRAMUSCULAR; INTRAVENOUS at 13:03

## 2019-06-27 RX ADMIN — Medication 20 MG: at 13:15

## 2019-06-27 RX ADMIN — FENTANYL CITRATE 25 MCG: 50 INJECTION, SOLUTION INTRAMUSCULAR; INTRAVENOUS at 13:27

## 2019-06-27 RX ADMIN — FENTANYL CITRATE 25 MCG: 50 INJECTION, SOLUTION INTRAMUSCULAR; INTRAVENOUS at 13:11

## 2019-06-27 RX ADMIN — FENTANYL CITRATE 25 MCG: 50 INJECTION, SOLUTION INTRAMUSCULAR; INTRAVENOUS at 13:22

## 2019-06-27 RX ADMIN — Medication 2 G: at 13:03

## 2019-06-27 RX ADMIN — DEXAMETHASONE SODIUM PHOSPHATE 4 MG: 4 INJECTION, SOLUTION INTRA-ARTICULAR; INTRALESIONAL; INTRAMUSCULAR; INTRAVENOUS; SOFT TISSUE at 13:15

## 2019-06-27 RX ADMIN — Medication 10 MG: at 13:13

## 2019-06-27 RX ADMIN — LIDOCAINE HYDROCHLORIDE 50 MG: 10 INJECTION, SOLUTION EPIDURAL; INFILTRATION; INTRACAUDAL; PERINEURAL at 13:09

## 2019-06-27 RX ADMIN — SODIUM CHLORIDE, POTASSIUM CHLORIDE, SODIUM LACTATE AND CALCIUM CHLORIDE: 600; 310; 30; 20 INJECTION, SOLUTION INTRAVENOUS at 11:19

## 2019-06-27 ASSESSMENT — PULMONARY FUNCTION TESTS
PIF_VALUE: 4
PIF_VALUE: 3
PIF_VALUE: 3
PIF_VALUE: 4
PIF_VALUE: 3
PIF_VALUE: 2
PIF_VALUE: 4
PIF_VALUE: 4
PIF_VALUE: 3
PIF_VALUE: 1
PIF_VALUE: 4
PIF_VALUE: 3
PIF_VALUE: 3
PIF_VALUE: 4
PIF_VALUE: 2
PIF_VALUE: 3
PIF_VALUE: 3
PIF_VALUE: 5
PIF_VALUE: 1
PIF_VALUE: 4
PIF_VALUE: 1
PIF_VALUE: 3
PIF_VALUE: 4
PIF_VALUE: 2
PIF_VALUE: 2
PIF_VALUE: 4
PIF_VALUE: 3
PIF_VALUE: 1
PIF_VALUE: 3
PIF_VALUE: 3
PIF_VALUE: 4
PIF_VALUE: 4
PIF_VALUE: 3
PIF_VALUE: 3
PIF_VALUE: 1
PIF_VALUE: 3
PIF_VALUE: 3
PIF_VALUE: 2
PIF_VALUE: 3
PIF_VALUE: 1
PIF_VALUE: 3
PIF_VALUE: 4

## 2019-06-27 ASSESSMENT — PAIN DESCRIPTION - DESCRIPTORS: DESCRIPTORS: ACHING

## 2019-06-27 ASSESSMENT — ENCOUNTER SYMPTOMS: STRIDOR: 0

## 2019-06-27 ASSESSMENT — PAIN SCALES - GENERAL
PAINLEVEL_OUTOF10: 0

## 2019-06-27 ASSESSMENT — PAIN - FUNCTIONAL ASSESSMENT: PAIN_FUNCTIONAL_ASSESSMENT: 0-10

## 2019-06-27 NOTE — BRIEF OP NOTE
Brief Postoperative Note  ______________________________________________________________    Patient: Sagrario Meyers  YOB: 1945  MRN: [id number]  Date of Procedure: 6/27/2019    Pre-Op Diagnosis: BLADDER CANCER    Post-Op Diagnosis: Same       Procedure(s):  CYSTOSCOPY TRANSURETHRAL RESECTION BLADDER TUMOR    Anesthesia: Anesthesia type not filed in the log.     Surgeon(s):  Hiwot Higginbotham MD    Assistant:     Estimated Blood Loss (mL): less than 50     Complications: None    Specimens:   * No specimens in log *    Implants:  * No implants in log *      Drains:   Urethral Catheter Straight-tip;Triple-lumen 20 fr (Active)       Findings: andrew Higginbotham MD  Date: 6/27/2019  Time: 12:36 PM

## 2019-06-27 NOTE — ANESTHESIA POSTPROCEDURE EVALUATION
Department of Anesthesiology  Postprocedure Note    Patient: Alecia Dejesus  MRN: [id number]  YOB: 1945  Date of evaluation: 6/27/2019  Time:  5:39 PM     Procedure Summary     Date:  06/27/19 Room / Location:  [facility] 02 / [address]    Anesthesia Start:  6654 Anesthesia Stop:  8978    Procedure:  CYSTOSCOPY TRANSURETHRAL RESECTION BLADDER TUMOR (N/A Bladder) Diagnosis:  (BLADDER CANCER)    Surgeon:  Radha Bradley MD Responsible Provider:  Tom Mccoy MD    Anesthesia Type:  general ASA Status:  3          Anesthesia Type: general    Pankaj Phase I: Pankaj Score: 10    Pankaj Phase II: Pankaj Score: 10    Last vitals: Reviewed and per EMR flowsheets.        Anesthesia Post Evaluation    Comments: POST- ANESTHESIA EVALUATION       Pt Name: Alecia Dejesus  MRN: [id number]  YOB: 1945  Date of evaluation: 6/27/2019  Time:  5:39 PM      /72   Pulse 68   Temp 97 °F (36.1 °C)   Resp 18   Ht 5' 11\" (1.803 m)   Wt 234 lb (106.1 kg)   SpO2 97%   BMI 32.64 kg/m²      Consciousness Level  Awake  Cardiopulmonary Status  Stable  Pain Adequately Treated YES  Nausea / Vomiting  NO  Adequate Hydration  YES  Anesthesia Related Complications NONE      Electronically signed by Charleen Ormond, MD on 6/27/2019 at 5:39 PM

## 2019-06-27 NOTE — ANESTHESIA PRE PROCEDURE
Department of Anesthesiology  Preprocedure Note       Name:  Enrico Marlow   Age:  75 y.o.  :  1945                                          MRN:  [id number]         Date:  2019      Surgeon: Isa Vásquez):  Johanna Upton MD    Procedure: CYSTOSCOPY TRANSURETHRAL RESECTION BLADDER TUMOR (N/A Bladder)    Medications prior to admission:   Prior to Admission medications    Medication Sig Start Date End Date Taking? Authorizing Provider   Multiple Vitamins-Minerals (THERAPEUTIC MULTIVITAMIN-MINERALS) tablet Take 1 tablet by mouth daily   Yes Historical Provider, MD   isosorbide mononitrate (IMDUR) 60 MG extended release tablet TAKE ONE TABLET BY MOUTH DAILY 19  Yes JOSE Johnson CNP   furosemide (LASIX) 80 MG tablet TAKE ONE TABLET BY MOUTH DAILY 19  Yes JOSE Johnson CNP   aspirin 81 MG tablet Take 81 mg by mouth daily   Yes Historical Provider, MD   RANEXA 500 MG extended release tablet TAKE ONE TABLET BY MOUTH TWICE A DAY 5/3/19  Yes JOSE Johnson CNP   metFORMIN (GLUCOPHAGE) 500 MG tablet TAKE TWO TABLETS BY MOUTH TWICE A DAY WITH MEALS 19  Yes JOSE Johnson CNP   morphine (MS CONTIN) 30 MG extended release tablet Take 30 mg by mouth 3 times daily.    Yes Historical Provider, MD   DULERA 100-5 MCG/ACT inhaler INHALE TWO PUFFS BY MOUTH TWICE A DAY 19  Yes JOSE Griggs Mc, CNP   captopril (CAPOTEN) 25 MG tablet TAKE ONE TABLET BY MOUTH THREE TIMES A DAY 3/25/19  Yes JOSE Mcdaniels CNP   lansoprazole (PREVACID) 30 MG delayed release capsule TAKE ONE CAPSULE BY MOUTH DAILY 3/18/19  Yes JOSE Johnson CNP   diphenhydrAMINE-APAP, sleep, (TYLENOL PM EXTRA STRENGTH)  MG tablet Take 1 tablet by mouth nightly as needed for Sleep 19  Yes JOSE Johnson CNP   atorvastatin (LIPITOR) 40 MG tablet TAKE ONE TABLET BY MOUTH DAILY 19  Yes JOSE Griggs Mc, CNP   potassium chloride (KLOR-CON M) 20 MEQ extended release tablet TAKE ONE TABLET BY MOUTH DAILY 2/14/19  Yes JOSE Argueta CNP   atenolol (TENORMIN) 25 MG tablet TAKE ONE TABLET BY MOUTH DAILY 2/14/19  Yes JOSE Argueta CNP   allopurinol (ZYLOPRIM) 100 MG tablet TAKE ONE TABLET BY MOUTH DAILY 2/14/19  Yes JOSE Argueta CNP   Magnesium Oxide 500 MG TABS Take 500 mg by mouth daily 10/12/18  Yes Jeremiah Cantor MD   FREESTYLE LANCETS MISC USE THREE DAILY TO TEST BLOOD SUGAR 5/30/19   JOSE Martinez CNP   Glucose Blood (BLOOD GLUCOSE TEST STRIPS) STRP Free Style Lite, Use 3 daily. Insulin Dependent 6/25/18   JOSE Wills CNP   ondansetron (ZOFRAN) 8 MG tablet Take 1 tablet by mouth every 12 hours as needed for Nausea or Vomiting 4/9/18   JOSE Martinez CNP   Blood Glucose Monitoring Suppl ROMY Glucometer- Free Style Lite 6/12/17   Nancy Jean DO   Lancet Device MISC 1 Device by [employer]. (Med.Supl.;Non-Drugs) route 3 times daily To check blood sugars. Free Style Lite 6/12/17   Meryulisa MiniDO       Current medications:    Current Facility-Administered Medications   Medication Dose Route Frequency Provider Last Rate Last Dose    lactated ringers infusion   Intravenous Continuous Jefm Mohs,  mL/hr at 06/27/19 1119      lidocaine PF 1 % injection 1 mL  1 mL Intradermal Once PRN Jefm Mohs, MD        sodium chloride flush 0.9 % injection 10 mL  10 mL Intravenous 2 times per day Jefm Mohs, MD        sodium chloride flush 0.9 % injection 10 mL  10 mL Intravenous PRN Jefm Mohs, MD           Allergies:     Allergies   Allergen Reactions    Baclofen Shortness Of Breath    Methadone Shortness Of Breath    Iodides Nausea And Vomiting       Problem List:    Patient Active Problem List   Diagnosis Code    AAA (abdominal aortic aneurysm) (HCC) I71.4    Hypertension I10    Hyperlipidemia E78.5    OA (osteoarthritis) of hip M16.9    Colon polyps K63.5    Gout M10.9    GERD (gastroesophageal reflux disease) K21.9    Oropharyngeal dysphagia R13.12    Post laminectomy syndrome M96.1    Tobacco use Z72.0    Ventral hernia K43.9    Obese E66.9    Chronic back pain sees Dr Belle Hinton M54.9, G89.29    DDD (degenerative disc disease), lumbar M51.36    Diabetic nephropathy with proteinuria ([address] E11.21    Type 2 diabetes with nephropathy ([address] E11.21    Mitral regurgitation I34.0    Hx of CABG Z95.1    Macrocytosis without anemia D75.89    Coronary artery disease involving native coronary artery of native heart without angina pectoris I25.10    Ear lesion H93.90    Chondrodermatitis nodularis chronica helicis H93.218    Actinic keratosis L57.0    Neoplasm of uncertain behavior of skin D48.5    CHF (congestive heart failure) (HCC) I50.9    CAD (coronary artery disease) I25.10    Obesity (BMI 30-39. 9) E66.9    History of congestive heart failure Z86.79    Central sleep apnea G47.31    Malignant neoplasm of urinary bladder (HCC) C67.9       Past Medical History:        Diagnosis Date    AAA [facility]ab[facility] aortic aneurysm) ([facility])     Dr Rao Sullivan, repaired    CAD (coronary artery disease)     Dr Manrique [facility])     bladder    Carotid artery stenosis     Central sleep apnea 10/16/2017    CHF (congestive heart failure) ([address]     Colon polyps     Dr Lotus Gomez DDD (degenerative disc disease), lumbar 11/3/2015    Diabetic nephropathy with proteinuria ([address] 11/20/2015    Full dentures     Upper only    GERD (gastroesophageal reflux disease) 7/10/2014    Gout 2/7/2014    Heart attack ([address] 1984    Hx of CABG 12/9/2015    Hyperlipidemia     Hypertension     On Rx.     Mitral regurgitation 12/9/2015    Oropharyngeal dysphagia 7/10/2014    Post laminectomy syndrome 9/24/2014    Tobacco use 11/25/2014    Type 2 diabetes with nephropathy ([address] 11/20/2015    Wears glasses        Past Surgical History:        Procedure Laterality Date    ABDOMINAL AORTIC ANEURYSM REPAIR  12/02    ARTERIAL BYPASS SURGRY  1984    Double    BACK SURGERY  2000, 2003    lumbar    CARDIAC SURGERY      cabg, 12/84    COLONOSCOPY      CORONARY ARTERY BYPASS GRAFT  12/1984    CYSTOSCOPY  02/05/2018    RT RETROGRADE TUR-BT WITH MITOMYCIN INSTILLATION    CYSTOSCOPY N/A 5/21/2019    CYSTOSCOPY TRANSURETHRAL RESECTION BLADDER - CYSTO TUR BLADDER TUMOR performed by Patricia Prdao MD at [address] Right 2017    cyst removal     HERNIA REPAIR      ventral    HIATAL HERNIA REPAIR  12/1980    Hiatal    KNEE ARTHROSCOPY Left 2005    KNEE ARTHROSCOPY Right 2006    KNEE SURGERY Left 2008    MALIGNANT SKIN LESION EXCISION Right 04/27/2017    Dr. Shellie Trinidad  2004    abdominal muscle repair by Dr. Frederic Belle Left 2012    MD CYSTOURETHROSCOPY,FULGUR .5-2CM LESN N/A 2/5/2018    CYSTO, BILATERAL RETROGRADE PYELOGRAM, TUR BLADDER TUMOR WITH GYRUS INSTALLATION OF MITOMYCIN performed by Patricia Prado MD at [address] TUMOR REMOVAL Right 2009    neck, benign    VENTRAL HERNIA REPAIR Left 10/1997    Ventral    WRIST GANGLION EXCISION Left 2008       Social History:    Social History     Tobacco Use    Smoking status: Current Every Day Smoker     Packs/day: 0.50     Years: 50.00     Pack years: 25.00     Types: Cigarettes     Start date: 1867    Smokeless tobacco: Never Used   Substance Use Topics    Alcohol use:  No                                Ready to quit: Not Answered  Counseling given: Not Answered      Vital Signs (Current):   Vitals:    06/27/19 1053 06/27/19 1100   BP:  109/67   Pulse:  70   Resp:  16   Temp:  97.5 °F (36.4 °C)   TempSrc:  Oral   SpO2:  96%   Weight: 234 lb (106.1 kg)    Height: 5' 11\" (1.803 m)                                               BP Readings from Last 3 Encounters:   06/27/19 109/67   06/17/19 110/77   06/21/19 113/70       NPO Status: Time of last liquid consumption: 2300 Time of last solid consumption: 2300                        Date of last liquid consumption: 06/26/19                        Date of last solid food consumption: 06/26/19    BMI:   Wt Readings from Last 3 Encounters:   06/27/19 234 lb (106.1 kg)   06/17/19 234 lb (106.1 kg)   06/21/19 239 lb 6.4 oz (108.6 kg)     Body mass index is 32.64 kg/m².     CBC:   Lab Results   Component Value Date    WBC 8.4 06/17/2019    RBC 3.38 06/17/2019    HGB 11.6 06/17/2019    HCT 34.3 06/17/2019    .5 06/17/2019    RDW 16.4 06/17/2019     06/17/2019       CMP:   Lab Results   Component Value Date     06/24/2019    K 4.2 06/24/2019    CL 97 06/24/2019    CO2 26 06/24/2019    BUN 25 06/24/2019    CREATININE 1.42 06/24/2019    GFRAA 59 06/24/2019    LABGLOM 49 06/24/2019    GLUCOSE 113 06/24/2019    PROT 8.0 06/24/2019    CALCIUM 9.4 06/24/2019    BILITOT 0.36 06/24/2019    ALKPHOS 68 06/24/2019    AST 11 06/24/2019    ALT 10 06/24/2019       POC Tests:   Recent Labs     06/27/19  1115   POCGLU 134*       Coags: No results found for: PROTIME, INR, APTT    HCG (If Applicable): No results found for: PREGTESTUR, PREGSERUM, HCG, HCGQUANT     ABGs: No results found for: PHART, PO2ART, IWF6IGV, ILH4LZZ, BEART, E3LPYQWJ     Type & Screen (If Applicable):  No results found for: [facility]    Anesthesia Evaluation  Patient summary reviewed and Nursing notes reviewed  Airway: Mallampati: II  TM distance: >3 FB   Neck ROM: full  Mouth opening: > = 3 FB Dental: normal exam         Pulmonary: breath sounds clear to auscultation      (-) rhonchi, wheezes, rales and stridor                           Cardiovascular:    (+) hypertension:, past MI:, CAD:, CABG/stent:, CHF:,     (-) murmur, weak pulses,  friction rub, systolic click, carotid bruit,  JVD and peripheral edema    ECG reviewed  Rhythm: regular  Rate: normal           Beta Blocker:  Dose within 24 Hrs         Neuro/Psych:               GI/Hepatic/Renal: (+) GERD:,           Endo/Other:    (+) Diabetes, . Abdominal:           Vascular:                                      Anesthesia Plan      general     ASA 3       Induction: intravenous. MIPS: Postoperative opioids intended and Prophylactic antiemetics administered. Anesthetic plan and risks discussed with patient. Plan discussed with CRNA.                   Cas Peters MD   6/27/2019

## 2019-06-27 NOTE — H&P
normal.   Mouth/Throat: Oropharynx is clear and moist.   UPPER AND LOWER DENTURES   Eyes: Pupils are equal, round, and reactive to light. Conjunctivae are normal.   Neck: Normal range of motion. No tracheal deviation present. No thyromegaly present. Cardiovascular: Normal rate and normal heart sounds. No murmur heard. Pulmonary/Chest: Effort normal and breath sounds normal. He has no wheezes. Abdominal: Soft. Bowel sounds are normal. He exhibits no mass. There is no tenderness. Musculoskeletal: Normal range of motion. He exhibits no edema or deformity. Neurological: He is alert. He displays normal reflexes. No cranial nerve deficit. He exhibits normal muscle tone. Skin: Skin is warm. Capillary refill takes less than 2 seconds.    Psychiatric: His behavior is normal. Judgment normal.      PROVISIONAL DIAGNOSES:      Bladder cancer  For Cysto and TURP     JOSE Mccall CNP on 6/17/2019 at 11:05 AM    Cosigned by: Christy Renee MD at 6/18/2019  2:40 PM

## 2019-06-28 ENCOUNTER — PROCEDURE VISIT (OUTPATIENT)
Dept: UROLOGY | Age: 74
End: 2019-06-28

## 2019-06-28 VITALS
SYSTOLIC BLOOD PRESSURE: 122 MMHG | WEIGHT: 233.91 LBS | HEIGHT: 71 IN | BODY MASS INDEX: 32.75 KG/M2 | DIASTOLIC BLOOD PRESSURE: 80 MMHG | TEMPERATURE: 98 F | HEART RATE: 77 BPM

## 2019-06-28 DIAGNOSIS — C67.9 MALIGNANT NEOPLASM OF URINARY BLADDER, UNSPECIFIED SITE (HCC): Primary | ICD-10-CM

## 2019-06-28 PROCEDURE — 99999 PR OFFICE/OUTPT VISIT,PROCEDURE ONLY: CPT | Performed by: UROLOGY

## 2019-06-28 RX ORDER — SODIUM CHLORIDE 9 MG/ML
INJECTION, SOLUTION INTRAVENOUS CONTINUOUS
Status: CANCELLED | OUTPATIENT
Start: 2019-06-28

## 2019-06-28 NOTE — PROGRESS NOTES
Per Dr David Delgado patient presented in office with cath 20F with clear yellow urine in bag. Patient had cath removed and balloon deflated. Patient tolerated well with no complaints. Patient instructed to stay hydrated with water and to call if any fever, chills, or inability to go.

## 2019-06-29 NOTE — OP NOTE
entirely  resected, I used Ellick to remove all the specimen. This was sent as a  permanent specimen to pathology. I then coagulated the entire area. I  made sure there is adequate hemostasis. I then drained the bladder. Again, I made sure there is adequate hemostasis. I made sure there are  no other lesions that needed to be resected. Both ureteral orifices  were intact. I then removed the scope and placed a 22-cm [facility]  three-way Paulino catheter. Bladder irrigation was started and that was  end of the procedure. The patient will be discharged home. He will  follow up with me tomorrow for voiding trial in the office. I was  present and scrubbed throughout the entire case. Ulises Harley    D: 06/28/2019 15:41:02       T: 06/29/2019 4:29:07     [id number]  Job#: [id number]     Doc#: [id number]    CC:   Amarilis Call

## 2019-07-01 ENCOUNTER — HOSPITAL ENCOUNTER (OUTPATIENT)
Dept: INTERVENTIONAL RADIOLOGY/VASCULAR | Age: 74
Discharge: HOME | End: 2019-07-03
Payer: MEDICARE

## 2019-07-01 VITALS
SYSTOLIC BLOOD PRESSURE: 104 MMHG | RESPIRATION RATE: 14 BRPM | HEART RATE: 51 BPM | DIASTOLIC BLOOD PRESSURE: 60 MMHG | OXYGEN SATURATION: 95 % | TEMPERATURE: 96.8 F

## 2019-07-01 DIAGNOSIS — C67.9 MALIGNANT NEOPLASM OF URINARY BLADDER, UNSPECIFIED SITE (HCC): ICD-10-CM

## 2019-07-01 LAB
INR BLD: 1.1
PARTIAL THROMBOPLASTIN TIME: 30.7 SEC (ref 24–36)
PLATELET # BLD: 240 K/UL (ref 150–450)
PROTHROMBIN TIME: 13.9 SEC (ref 11.8–14.6)
SURGICAL PATHOLOGY REPORT: NORMAL

## 2019-07-01 PROCEDURE — 85610 PROTHROMBIN TIME: CPT

## 2019-07-01 PROCEDURE — 2580000003 HC RX 258: Performed by: RADIOLOGY

## 2019-07-01 PROCEDURE — 2709999900 IR PORT PLACEMENT > 5 YEARS

## 2019-07-01 PROCEDURE — 36561 INSERT TUNNELED CV CATH: CPT | Performed by: RADIOLOGY

## 2019-07-01 PROCEDURE — 6360000002 HC RX W HCPCS: Performed by: RADIOLOGY

## 2019-07-01 PROCEDURE — 85049 AUTOMATED PLATELET COUNT: CPT

## 2019-07-01 PROCEDURE — 76937 US GUIDE VASCULAR ACCESS: CPT | Performed by: RADIOLOGY

## 2019-07-01 PROCEDURE — 85730 THROMBOPLASTIN TIME PARTIAL: CPT

## 2019-07-01 PROCEDURE — 7100000031 HC ASPR PHASE II RECOVERY - ADDTL 15 MIN

## 2019-07-01 PROCEDURE — 7100000030 HC ASPR PHASE II RECOVERY - FIRST 15 MIN

## 2019-07-01 PROCEDURE — 36415 COLL VENOUS BLD VENIPUNCTURE: CPT

## 2019-07-01 PROCEDURE — 2500000003 HC RX 250 WO HCPCS: Performed by: RADIOLOGY

## 2019-07-01 PROCEDURE — 77001 FLUOROGUIDE FOR VEIN DEVICE: CPT | Performed by: RADIOLOGY

## 2019-07-01 RX ORDER — MIDAZOLAM HYDROCHLORIDE 1 MG/ML
INJECTION INTRAMUSCULAR; INTRAVENOUS
Status: COMPLETED | OUTPATIENT
Start: 2019-07-01 | End: 2019-07-01

## 2019-07-01 RX ORDER — LIDOCAINE HYDROCHLORIDE 10 MG/ML
INJECTION, SOLUTION INFILTRATION; PERINEURAL
Status: COMPLETED | OUTPATIENT
Start: 2019-07-01 | End: 2019-07-01

## 2019-07-01 RX ORDER — LIDOCAINE HYDROCHLORIDE AND EPINEPHRINE 10; 10 MG/ML; UG/ML
INJECTION, SOLUTION INFILTRATION; PERINEURAL
Status: COMPLETED | OUTPATIENT
Start: 2019-07-01 | End: 2019-07-01

## 2019-07-01 RX ORDER — SODIUM CHLORIDE 9 MG/ML
INJECTION, SOLUTION INTRAVENOUS CONTINUOUS
Status: DISCONTINUED | OUTPATIENT
Start: 2019-07-01 | End: 2019-07-04 | Stop reason: HOSPADM

## 2019-07-01 RX ORDER — HEPARIN SODIUM (PORCINE) LOCK FLUSH IV SOLN 100 UNIT/ML 100 UNIT/ML
SOLUTION INTRAVENOUS
Status: COMPLETED | OUTPATIENT
Start: 2019-07-01 | End: 2019-07-01

## 2019-07-01 RX ORDER — ACETAMINOPHEN 325 MG/1
650 TABLET ORAL EVERY 4 HOURS PRN
Status: DISCONTINUED | OUTPATIENT
Start: 2019-07-01 | End: 2019-07-04 | Stop reason: HOSPADM

## 2019-07-01 RX ORDER — FENTANYL CITRATE 50 UG/ML
INJECTION, SOLUTION INTRAMUSCULAR; INTRAVENOUS
Status: COMPLETED | OUTPATIENT
Start: 2019-07-01 | End: 2019-07-01

## 2019-07-01 RX ADMIN — SODIUM CHLORIDE: 9 INJECTION, SOLUTION INTRAVENOUS at 11:19

## 2019-07-01 RX ADMIN — SODIUM CHLORIDE, PRESERVATIVE FREE 5 ML: 5 INJECTION INTRAVENOUS at 12:07

## 2019-07-01 RX ADMIN — LIDOCAINE HYDROCHLORIDE 5 ML: 10 INJECTION, SOLUTION INFILTRATION; PERINEURAL at 11:55

## 2019-07-01 RX ADMIN — MIDAZOLAM 0.5 MG: 1 INJECTION INTRAMUSCULAR; INTRAVENOUS at 11:58

## 2019-07-01 RX ADMIN — FENTANYL CITRATE 50 MCG: 50 INJECTION, SOLUTION INTRAMUSCULAR; INTRAVENOUS at 11:52

## 2019-07-01 RX ADMIN — LIDOCAINE HYDROCHLORIDE AND EPINEPHRINE 5 ML: 10; 10 INJECTION, SOLUTION INFILTRATION; PERINEURAL at 11:58

## 2019-07-01 RX ADMIN — LIDOCAINE HYDROCHLORIDE 5 ML: 10 INJECTION, SOLUTION INFILTRATION; PERINEURAL at 11:57

## 2019-07-01 RX ADMIN — DEXTROSE MONOHYDRATE 1 G: 5 INJECTION INTRAVENOUS at 11:19

## 2019-07-01 RX ADMIN — MIDAZOLAM 1 MG: 1 INJECTION INTRAMUSCULAR; INTRAVENOUS at 11:52

## 2019-07-01 ASSESSMENT — PAIN SCALES - GENERAL
PAINLEVEL_OUTOF10: 0
PAINLEVEL_OUTOF10: 0

## 2019-07-01 NOTE — H&P
HISTORY and [facility] [address]       NAME:  Surinder Holguin  MRN: [id number]   YOB: 1945   Date: 7/1/2019   Age: 75 y.o. Gender: male       COMPLAINT AND PRESENT HISTORY:               Surinder Holguin is 75 y.o. , male, for chemo port placement. Patient was diagnosed with bladder cancer 18 months ago, patient had multiple bladder scraping and BCG irrigations. Patient will be receiving chemotherapy will need a port for administration of chemo. Patient maintains a good flow of urine no burning no discoloration no frequency hesitancy urgency. No Fever chills    PAST MEDICAL HISTORY     Past Medical History:   Diagnosis Date    AAA (abdominal aortic aneurysm) ([facility])     Dr Juan Torres, repaired    CAD (coronary artery disease)     Dr Colvin [facility])     bladder    Carotid artery stenosis     Central sleep apnea 10/16/2017    CHF (congestive heart failure) ([facility])     Colon polyps     Dr Sharyn Andres DDD (degenerative disc disease), lumbar 11/3/2015    Diabetic nephropathy with proteinuria ([address] 11/20/2015    Full dentures     Upper only    GERD (gastroesophageal reflux disease) 7/10/2014    Gout 2/7/2014    Heart attack ([address] 1984    Hx of CABG 12/9/2015    Hyperlipidemia     Hypertension     On Rx.     Mitral regurgitation 12/9/2015    Oropharyngeal dysphagia 7/10/2014    Post laminectomy syndrome 9/24/2014    Tobacco use 11/25/2014    Type 2 diabetes with nephropathy ([address] 11/20/2015    Wears glasses        SURGICAL HISTORY       Past Surgical History:   Procedure Laterality Date    ABDOMINAL AORTIC ANEURYSM REPAIR  12/02    ARTERIAL BYPASS SURGRY  1984    Double    BACK SURGERY  2000, 2003    lumbar    CARDIAC SURGERY      cabg, 12/84    COLONOSCOPY      CORONARY ARTERY BYPASS GRAFT  12/1984    CYSTOSCOPY  02/05/2018    RT RETROGRADE TUR-BT WITH MITOMYCIN INSTILLATION    CYSTOSCOPY N/A 5/21/2019    CYSTOSCOPY TRANSURETHRAL RESECTION BLADDER - CYSTO TUR BLADDER TUMOR performed by Casa Mata MD at [address] N/A 6/27/2019    CYSTOSCOPY TRANSURETHRAL RESECTION BLADDER TUMOR performed by Lalo Qiu MD at [address] Right 2017    cyst removal     HERNIA REPAIR      ventral    HIATAL HERNIA REPAIR  12/1980    Hiatal    KNEE ARTHROSCOPY Left 2005    KNEE ARTHROSCOPY Right 2006    KNEE SURGERY Left 2008    MALIGNANT SKIN LESION EXCISION Right 04/27/2017    Dr. Bradley Casas  2004    abdominal muscle repair by Dr. Shayna Thompson Left 2012    [place] CYSTOURETHROSCOPY,FULGUR .5-2CM LESN N/A 2/5/2018    CYSTO, BILATERAL RETROGRADE PYELOGRAM, TUR BLADDER TUMOR WITH GYRUS INSTALLATION OF MITOMYCIN performed by [facility]a [facility]ata MD at [facility] Right 2009    neck, benign    VENTRAL HERNIA REPAIR Left 10/1997    Ventral    WRIST GANGLION EXCISION Left 2008       FAMILY HISTORY       Family History   Problem Relation Age of Onset    Breast Cancer Mother     High Blood Pressure Father     Heart Disease Father     Stroke Father     Heart Disease Brother     Heart Surgery Brother         Bypass       SOCIAL HISTORY       Social History     Socioeconomic History    Marital status:      Spouse name: Rom Jonas Number of children: 0    Years of education: None    Highest education level: None   Occupational History    None   Social Needs    Financial resource strain: None    Food insecurity:     Worry: None     Inability: None    Transportation needs:     Medical: None     Non-medical: None   Tobacco Use    Smoking status: Current Every Day Smoker     Packs/day: 0.50     Years: 50.00     Pack years: 25.00     Types: Cigarettes     Start date: 1867    Smokeless tobacco: Never Used   Substance and Sexual Activity    Alcohol use: No    Drug use: No    Sexual activity: None   Lifestyle    Physical activity:     Days per week: None     Minutes per session: None    Stress: None by mouth nightly as needed for Sleep 14 tablet 0    atorvastatin (LIPITOR) 40 MG tablet TAKE ONE TABLET BY MOUTH DAILY 90 tablet 3    potassium chloride (KLOR-CON M) 20 MEQ extended release tablet TAKE ONE TABLET BY MOUTH DAILY 90 tablet 3    atenolol (TENORMIN) 25 MG tablet TAKE ONE TABLET BY MOUTH DAILY 90 tablet 3    allopurinol (ZYLOPRIM) 100 MG tablet TAKE ONE TABLET BY MOUTH DAILY 90 tablet 3    Magnesium Oxide 500 MG TABS Take 500 mg by mouth daily 90 tablet 2    Glucose Blood (BLOOD GLUCOSE TEST STRIPS) STRP Free Style Lite, Use 3 daily. Insulin Dependent 300 strip 5    Blood Glucose Monitoring Suppl ROMY Glucometer- Free Style Lite 1 Device 0    Lancet Device MISC 1 Device by [employer] (Med.Supl.;Non-Drugs) route 3 times daily To check blood sugars. Free Style Lite 1 Device 0    ondansetron (ZOFRAN) 8 MG tablet Take 1 tablet by mouth every 12 hours as needed for Nausea or Vomiting 60 tablet 2     Current Facility-Administered Medications on File Prior to Encounter   Medication Dose Route Frequency Provider Last Rate Last Dose    sodium chloride flush 0.9 % injection 10 mL  10 mL Intravenous BID Kristi Taylor MD           Negative except for what is mentioned in the HPI. GENERAL PHYSICAL EXAM     Vitals: /69   Pulse 60   Temp 97.3 °F (36.3 °C) (Temporal)   Resp 14   SpO2 96%  There is no height or weight on file to calculate BMI. GENERAL APPEARANCE:   Dorian Oliva is 75 y.o.,  male, moderately obese, nourished, conscious, alert. Does not appear to be distress or pain at this time. SKIN:  Warm, dry, no cyanosis or jaundice. HEAD:  Normocephalic, atraumatic, no swelling or tenderness. EYES:  Pupils equal, reactive to light. EARS:  No discharge, no marked hearing loss. NOSE:  No rhinorrhea, epistaxis or septal deformity. THROAT:  Not congested. No ulceration bleeding or discharge.

## 2019-07-03 ENCOUNTER — OFFICE VISIT (OUTPATIENT)
Dept: ONCOLOGY | Age: 74
End: 2019-07-03
Payer: MEDICARE

## 2019-07-03 DIAGNOSIS — C67.9 MALIGNANT NEOPLASM OF URINARY BLADDER, UNSPECIFIED SITE (HCC): Primary | ICD-10-CM

## 2019-07-03 PROCEDURE — 99204 OFFICE O/P NEW MOD 45 MIN: CPT | Performed by: INTERNAL MEDICINE

## 2019-07-03 PROCEDURE — 99999 PR OFFICE/OUTPT VISIT,PROCEDURE ONLY: CPT | Performed by: INTERNAL MEDICINE

## 2019-07-03 RX ORDER — LIDOCAINE AND PRILOCAINE 25; 25 MG/G; MG/G
CREAM TOPICAL
Qty: 1 TUBE | Refills: 2 | Status: SHIPPED | OUTPATIENT
Start: 2019-07-03 | End: 2020-05-07 | Stop reason: ALTCHOICE

## 2019-07-03 RX ORDER — ONDANSETRON 8 MG/1
8 TABLET, ORALLY DISINTEGRATING ORAL EVERY 8 HOURS PRN
Qty: 90 TABLET | Refills: 2 | Status: ON HOLD | OUTPATIENT
Start: 2019-07-03 | End: 2019-10-04 | Stop reason: HOSPADM

## 2019-07-03 RX ORDER — PROCHLORPERAZINE MALEATE 10 MG
10 TABLET ORAL EVERY 6 HOURS PRN
Qty: 120 TABLET | Refills: 3 | Status: SHIPPED | OUTPATIENT
Start: 2019-07-03 | End: 2020-05-07 | Stop reason: ALTCHOICE

## 2019-07-11 ENCOUNTER — OFFICE VISIT (OUTPATIENT)
Dept: UROLOGY | Age: 74
End: 2019-07-11
Payer: MEDICARE

## 2019-07-11 VITALS
SYSTOLIC BLOOD PRESSURE: 112 MMHG | HEART RATE: 60 BPM | WEIGHT: 233.91 LBS | BODY MASS INDEX: 32.75 KG/M2 | TEMPERATURE: 97.9 F | DIASTOLIC BLOOD PRESSURE: 64 MMHG | HEIGHT: 71 IN

## 2019-07-11 DIAGNOSIS — N13.8 HYPERTROPHY OF PROSTATE WITH URINARY OBSTRUCTION: ICD-10-CM

## 2019-07-11 DIAGNOSIS — C67.8 MALIGNANT NEOPLASM OF OVERLAPPING SITES OF BLADDER (HCC): Primary | ICD-10-CM

## 2019-07-11 DIAGNOSIS — R35.1 NOCTURIA: ICD-10-CM

## 2019-07-11 DIAGNOSIS — N40.1 HYPERTROPHY OF PROSTATE WITH URINARY OBSTRUCTION: ICD-10-CM

## 2019-07-11 PROCEDURE — 4040F PNEUMOC VAC/ADMIN/RCVD: CPT | Performed by: UROLOGY

## 2019-07-11 PROCEDURE — 99214 OFFICE O/P EST MOD 30 MIN: CPT | Performed by: UROLOGY

## 2019-07-11 PROCEDURE — 1123F ACP DISCUSS/DSCN MKR DOCD: CPT | Performed by: UROLOGY

## 2019-07-11 PROCEDURE — 3017F COLORECTAL CA SCREEN DOC REV: CPT | Performed by: UROLOGY

## 2019-07-11 PROCEDURE — G8598 ASA/ANTIPLAT THER USED: HCPCS | Performed by: UROLOGY

## 2019-07-11 PROCEDURE — G8427 DOCREV CUR MEDS BY ELIG CLIN: HCPCS | Performed by: UROLOGY

## 2019-07-11 PROCEDURE — G8417 CALC BMI ABV UP PARAM F/U: HCPCS | Performed by: UROLOGY

## 2019-07-11 PROCEDURE — 4004F PT TOBACCO SCREEN RCVD TLK: CPT | Performed by: UROLOGY

## 2019-07-11 ASSESSMENT — ENCOUNTER SYMPTOMS
COUGH: 0
ABDOMINAL PAIN: 0
EYE REDNESS: 0
DIARRHEA: 0
CONSTIPATION: 0
SHORTNESS OF BREATH: 0
NAUSEA: 0
WHEEZING: 0
EYE PAIN: 0
VOMITING: 0
BACK PAIN: 0

## 2019-07-11 NOTE — PROGRESS NOTES
Review of Systems   Constitutional: Negative for appetite change, chills and fever. Eyes: Negative for pain, redness and visual disturbance. Respiratory: Negative for cough, shortness of breath and wheezing. Cardiovascular: Negative for chest pain and leg swelling. Gastrointestinal: Negative for abdominal pain, constipation, diarrhea, nausea and vomiting. Genitourinary: Negative for difficulty urinating, dysuria, flank pain, frequency, hematuria and urgency. Musculoskeletal: Negative for back pain, joint swelling and myalgias. Skin: Negative for rash and wound. Neurological: Negative for dizziness, tremors and numbness. Hematological: Does not bruise/bleed easily.
overlapping sites of bladder ([facility] [address]    2. Nocturia    3. Hypertrophy of prostate with urinary obstruction           Plan:     have canios do lysis of adhesions  Book a cysto case while gen surg working, or even a cat 1 prostate  Then this case and that's it  Return in about 6 weeks (around 8/22/2019) for Follow up. Prescriptions Ordered:  No orders of the defined types were placed in this encounter. Orders Placed:  No orders of the defined types were placed in this encounter. Ashli Cesar MD    Agree with the ROS entered by the MA.

## 2019-07-12 ENCOUNTER — HOSPITAL ENCOUNTER (OUTPATIENT)
Dept: INFUSION THERAPY | Age: 74
Discharge: HOME | End: 2019-07-12
Payer: MEDICARE

## 2019-07-12 ENCOUNTER — TELEPHONE (OUTPATIENT)
Dept: ONCOLOGY | Age: 74
End: 2019-07-12

## 2019-07-12 ENCOUNTER — OFFICE VISIT (OUTPATIENT)
Dept: ONCOLOGY | Age: 74
End: 2019-07-12
Payer: MEDICARE

## 2019-07-12 VITALS
RESPIRATION RATE: 18 BRPM | HEART RATE: 62 BPM | BODY MASS INDEX: 33.18 KG/M2 | WEIGHT: 237.8 LBS | SYSTOLIC BLOOD PRESSURE: 120 MMHG | TEMPERATURE: 98 F | DIASTOLIC BLOOD PRESSURE: 70 MMHG

## 2019-07-12 DIAGNOSIS — C67.9 MALIGNANT NEOPLASM OF URINARY BLADDER, UNSPECIFIED SITE (HCC): Primary | ICD-10-CM

## 2019-07-12 LAB
ABSOLUTE EOS #: 0.2 K/UL (ref 0–0.4)
ABSOLUTE IMMATURE GRANULOCYTE: ABNORMAL K/UL (ref 0–0.3)
ABSOLUTE LYMPH #: 1.7 K/UL (ref 1–4.8)
ABSOLUTE MONO #: 0.5 K/UL (ref 0.1–1.2)
ALBUMIN SERPL-MCNC: 4 G/DL (ref 3.5–5.2)
ALBUMIN/GLOBULIN RATIO: 1.1 (ref 1–2.5)
ALP BLD-CCNC: 69 U/L (ref 40–129)
ALT SERPL-CCNC: 8 U/L (ref 5–41)
ANION GAP SERPL CALCULATED.3IONS-SCNC: 12 MMOL/L (ref 9–17)
AST SERPL-CCNC: 12 U/L
BASOPHILS # BLD: 1 % (ref 0–2)
BASOPHILS ABSOLUTE: 0.1 K/UL (ref 0–0.2)
BILIRUB SERPL-MCNC: 0.4 MG/DL (ref 0.3–1.2)
BUN BLDV-MCNC: 23 MG/DL (ref 8–23)
BUN/CREAT BLD: ABNORMAL (ref 9–20)
CALCIUM SERPL-MCNC: 9.5 MG/DL (ref 8.6–10.4)
CHLORIDE BLD-SCNC: 99 MMOL/L (ref 98–107)
CO2: 26 MMOL/L (ref 20–31)
CREAT SERPL-MCNC: 1.31 MG/DL (ref 0.7–1.2)
DIFFERENTIAL TYPE: ABNORMAL
EOSINOPHILS RELATIVE PERCENT: 3 % (ref 1–4)
FOLATE: >20 NG/ML
GFR AFRICAN AMERICAN: >60 ML/MIN
GFR NON-AFRICAN AMERICAN: 53 ML/MIN
GFR SERPL CREATININE-BSD FRML MDRD: ABNORMAL ML/MIN/{1.73_M2}
GFR SERPL CREATININE-BSD FRML MDRD: ABNORMAL ML/MIN/{1.73_M2}
GLUCOSE BLD-MCNC: 161 MG/DL (ref 70–99)
HCT VFR BLD CALC: 33.6 % (ref 41–53)
HEMOGLOBIN: 11.6 G/DL (ref 13.5–17.5)
IMMATURE GRANULOCYTES: ABNORMAL %
LYMPHOCYTES # BLD: 24 % (ref 24–44)
MAGNESIUM: 2 MG/DL (ref 1.6–2.6)
MCH RBC QN AUTO: 34.9 PG (ref 26–34)
MCHC RBC AUTO-ENTMCNC: 34.7 G/DL (ref 31–37)
MCV RBC AUTO: 100.8 FL (ref 80–100)
MONOCYTES # BLD: 7 % (ref 2–11)
NRBC AUTOMATED: ABNORMAL PER 100 WBC
PDW BLD-RTO: 16.5 % (ref 12.5–15.4)
PLATELET # BLD: 270 K/UL (ref 140–450)
PLATELET ESTIMATE: ABNORMAL
PMV BLD AUTO: 8.1 FL (ref 6–12)
POTASSIUM SERPL-SCNC: 4.1 MMOL/L (ref 3.7–5.3)
RBC # BLD: 3.34 M/UL (ref 4.5–5.9)
RBC # BLD: ABNORMAL 10*6/UL
SEG NEUTROPHILS: 65 % (ref 36–66)
SEGMENTED NEUTROPHILS ABSOLUTE COUNT: 4.6 K/UL (ref 1.8–7.7)
SODIUM BLD-SCNC: 137 MMOL/L (ref 135–144)
TOTAL PROTEIN: 7.7 G/DL (ref 6.4–8.3)
VITAMIN B-12: 250 PG/ML (ref 232–1245)
WBC # BLD: 7.1 K/UL (ref 3.5–11)
WBC # BLD: ABNORMAL 10*3/UL

## 2019-07-12 PROCEDURE — 36591 DRAW BLOOD OFF VENOUS DEVICE: CPT

## 2019-07-12 PROCEDURE — G8427 DOCREV CUR MEDS BY ELIG CLIN: HCPCS | Performed by: INTERNAL MEDICINE

## 2019-07-12 PROCEDURE — 1123F ACP DISCUSS/DSCN MKR DOCD: CPT | Performed by: INTERNAL MEDICINE

## 2019-07-12 PROCEDURE — 96366 THER/PROPH/DIAG IV INF ADDON: CPT

## 2019-07-12 PROCEDURE — 82607 VITAMIN B-12: CPT

## 2019-07-12 PROCEDURE — 85025 COMPLETE CBC W/AUTO DIFF WBC: CPT

## 2019-07-12 PROCEDURE — 96375 TX/PRO/DX INJ NEW DRUG ADDON: CPT

## 2019-07-12 PROCEDURE — 4004F PT TOBACCO SCREEN RCVD TLK: CPT | Performed by: INTERNAL MEDICINE

## 2019-07-12 PROCEDURE — 36415 COLL VENOUS BLD VENIPUNCTURE: CPT

## 2019-07-12 PROCEDURE — 96376 TX/PRO/DX INJ SAME DRUG ADON: CPT

## 2019-07-12 PROCEDURE — 96367 TX/PROPH/DG ADDL SEQ IV INF: CPT

## 2019-07-12 PROCEDURE — 99215 OFFICE O/P EST HI 40 MIN: CPT | Performed by: INTERNAL MEDICINE

## 2019-07-12 PROCEDURE — 6360000002 HC RX W HCPCS: Performed by: INTERNAL MEDICINE

## 2019-07-12 PROCEDURE — 2580000003 HC RX 258: Performed by: INTERNAL MEDICINE

## 2019-07-12 PROCEDURE — G8598 ASA/ANTIPLAT THER USED: HCPCS | Performed by: INTERNAL MEDICINE

## 2019-07-12 PROCEDURE — 80053 COMPREHEN METABOLIC PANEL: CPT

## 2019-07-12 PROCEDURE — 96417 CHEMO IV INFUS EACH ADDL SEQ: CPT

## 2019-07-12 PROCEDURE — 96413 CHEMO IV INFUSION 1 HR: CPT

## 2019-07-12 PROCEDURE — G8417 CALC BMI ABV UP PARAM F/U: HCPCS | Performed by: INTERNAL MEDICINE

## 2019-07-12 PROCEDURE — 96361 HYDRATE IV INFUSION ADD-ON: CPT

## 2019-07-12 PROCEDURE — 4040F PNEUMOC VAC/ADMIN/RCVD: CPT | Performed by: INTERNAL MEDICINE

## 2019-07-12 PROCEDURE — 83735 ASSAY OF MAGNESIUM: CPT

## 2019-07-12 PROCEDURE — 82746 ASSAY OF FOLIC ACID SERUM: CPT

## 2019-07-12 PROCEDURE — 3017F COLORECTAL CA SCREEN DOC REV: CPT | Performed by: INTERNAL MEDICINE

## 2019-07-12 RX ORDER — HEPARIN SODIUM (PORCINE) LOCK FLUSH IV SOLN 100 UNIT/ML 100 UNIT/ML
500 SOLUTION INTRAVENOUS PRN
Status: CANCELLED | OUTPATIENT
Start: 2019-07-12

## 2019-07-12 RX ORDER — SODIUM CHLORIDE 0.9 % (FLUSH) 0.9 %
10 SYRINGE (ML) INJECTION PRN
Status: DISCONTINUED | OUTPATIENT
Start: 2019-07-12 | End: 2019-07-13 | Stop reason: HOSPADM

## 2019-07-12 RX ORDER — DIPHENHYDRAMINE HYDROCHLORIDE 50 MG/ML
50 INJECTION INTRAMUSCULAR; INTRAVENOUS ONCE
Status: CANCELLED | OUTPATIENT
Start: 2019-07-19

## 2019-07-12 RX ORDER — HEPARIN SODIUM (PORCINE) LOCK FLUSH IV SOLN 100 UNIT/ML 100 UNIT/ML
500 SOLUTION INTRAVENOUS PRN
Status: DISCONTINUED | OUTPATIENT
Start: 2019-07-12 | End: 2019-07-13 | Stop reason: HOSPADM

## 2019-07-12 RX ORDER — SODIUM CHLORIDE 9 MG/ML
INJECTION, SOLUTION INTRAVENOUS CONTINUOUS
Status: CANCELLED | OUTPATIENT
Start: 2019-07-19

## 2019-07-12 RX ORDER — SODIUM CHLORIDE 9 MG/ML
20 INJECTION, SOLUTION INTRAVENOUS ONCE
Status: CANCELLED | OUTPATIENT
Start: 2019-07-19

## 2019-07-12 RX ORDER — DIPHENHYDRAMINE HYDROCHLORIDE 50 MG/ML
50 INJECTION INTRAMUSCULAR; INTRAVENOUS ONCE
Status: CANCELLED | OUTPATIENT
Start: 2019-07-12

## 2019-07-12 RX ORDER — SODIUM CHLORIDE 0.9 % (FLUSH) 0.9 %
10 SYRINGE (ML) INJECTION PRN
Status: CANCELLED | OUTPATIENT
Start: 2019-07-12

## 2019-07-12 RX ORDER — SODIUM CHLORIDE 9 MG/ML
20 INJECTION, SOLUTION INTRAVENOUS ONCE
Status: COMPLETED | OUTPATIENT
Start: 2019-07-12 | End: 2019-07-12

## 2019-07-12 RX ORDER — PALONOSETRON 0.05 MG/ML
0.25 INJECTION, SOLUTION INTRAVENOUS ONCE
Status: COMPLETED | OUTPATIENT
Start: 2019-07-12 | End: 2019-07-12

## 2019-07-12 RX ORDER — 0.9 % SODIUM CHLORIDE 0.9 %
10 VIAL (ML) INJECTION ONCE
Status: CANCELLED | OUTPATIENT
Start: 2019-07-12

## 2019-07-12 RX ORDER — HEPARIN SODIUM (PORCINE) LOCK FLUSH IV SOLN 100 UNIT/ML 100 UNIT/ML
500 SOLUTION INTRAVENOUS PRN
Status: CANCELLED | OUTPATIENT
Start: 2019-07-19

## 2019-07-12 RX ORDER — METHYLPREDNISOLONE SODIUM SUCCINATE 125 MG/2ML
125 INJECTION, POWDER, LYOPHILIZED, FOR SOLUTION INTRAMUSCULAR; INTRAVENOUS ONCE
Status: CANCELLED | OUTPATIENT
Start: 2019-07-19

## 2019-07-12 RX ORDER — METHYLPREDNISOLONE SODIUM SUCCINATE 125 MG/2ML
125 INJECTION, POWDER, LYOPHILIZED, FOR SOLUTION INTRAMUSCULAR; INTRAVENOUS ONCE
Status: CANCELLED | OUTPATIENT
Start: 2019-07-12

## 2019-07-12 RX ORDER — SODIUM CHLORIDE 0.9 % (FLUSH) 0.9 %
5 SYRINGE (ML) INJECTION PRN
Status: CANCELLED | OUTPATIENT
Start: 2019-07-12

## 2019-07-12 RX ORDER — DEXAMETHASONE SODIUM PHOSPHATE 10 MG/ML
10 INJECTION INTRAMUSCULAR; INTRAVENOUS ONCE
Status: COMPLETED | OUTPATIENT
Start: 2019-07-12 | End: 2019-07-12

## 2019-07-12 RX ORDER — SODIUM CHLORIDE 9 MG/ML
20 INJECTION, SOLUTION INTRAVENOUS ONCE
Status: CANCELLED | OUTPATIENT
Start: 2019-07-12

## 2019-07-12 RX ORDER — SODIUM CHLORIDE 0.9 % (FLUSH) 0.9 %
5 SYRINGE (ML) INJECTION PRN
Status: CANCELLED | OUTPATIENT
Start: 2019-07-19

## 2019-07-12 RX ORDER — PALONOSETRON 0.05 MG/ML
0.25 INJECTION, SOLUTION INTRAVENOUS ONCE
Status: CANCELLED | OUTPATIENT
Start: 2019-07-12

## 2019-07-12 RX ORDER — SODIUM CHLORIDE 9 MG/ML
INJECTION, SOLUTION INTRAVENOUS CONTINUOUS
Status: CANCELLED | OUTPATIENT
Start: 2019-07-12

## 2019-07-12 RX ORDER — 0.9 % SODIUM CHLORIDE 0.9 %
500 INTRAVENOUS SOLUTION INTRAVENOUS ONCE
Status: CANCELLED
Start: 2019-07-16

## 2019-07-12 RX ORDER — PALONOSETRON 0.05 MG/ML
0.25 INJECTION, SOLUTION INTRAVENOUS ONCE
Status: CANCELLED | OUTPATIENT
Start: 2019-07-19

## 2019-07-12 RX ORDER — 0.9 % SODIUM CHLORIDE 0.9 %
10 VIAL (ML) INJECTION ONCE
Status: CANCELLED | OUTPATIENT
Start: 2019-07-19

## 2019-07-12 RX ORDER — SODIUM CHLORIDE 0.9 % (FLUSH) 0.9 %
10 SYRINGE (ML) INJECTION PRN
Status: CANCELLED | OUTPATIENT
Start: 2019-07-19

## 2019-07-12 RX ADMIN — POTASSIUM CHLORIDE: 2 INJECTION, SOLUTION, CONCENTRATE INTRAVENOUS at 13:09

## 2019-07-12 RX ADMIN — CISPLATIN: 1 INJECTION INTRAVENOUS at 12:02

## 2019-07-12 RX ADMIN — PALONOSETRON HYDROCHLORIDE 0.25 MG: 0.25 INJECTION, SOLUTION INTRAVENOUS at 10:44

## 2019-07-12 RX ADMIN — GEMCITABINE 2330 MG: 38 INJECTION, SOLUTION INTRAVENOUS at 11:27

## 2019-07-12 RX ADMIN — Medication 10 ML: at 14:11

## 2019-07-12 RX ADMIN — Medication 500 UNITS: at 14:12

## 2019-07-12 RX ADMIN — SODIUM CHLORIDE 150 MG: 900 INJECTION, SOLUTION INTRAVENOUS at 10:49

## 2019-07-12 RX ADMIN — DEXAMETHASONE SODIUM PHOSPHATE 10 MG: 10 INJECTION INTRAMUSCULAR; INTRAVENOUS at 10:45

## 2019-07-12 RX ADMIN — SODIUM CHLORIDE 20 ML/HR: 9 INJECTION, SOLUTION INTRAVENOUS at 09:40

## 2019-07-12 RX ADMIN — POTASSIUM CHLORIDE: 2 INJECTION, SOLUTION, CONCENTRATE INTRAVENOUS at 09:40

## 2019-07-12 NOTE — PROGRESS NOTES
Patient arrived for C1D1 gemzar cisplatin   Labs collected and reviewed seen by Dr Gris Smith prior   [facility] tx without incident   Ambulated to exit in stable condition   Return 7/16 hydration   Apple Dempsey RN

## 2019-07-12 NOTE — PROGRESS NOTES
[facility]aim Initial Nutrition Assessment    Amarjit Lundy is a 75 y.o.  male     Reason for Evaluation: pgsga score 6    Subjective/Current Data:  Pt reports hx of unplanned wt loss, poor appetite/altered taste r/t AAA several years ago the patient reports he \"forces\" himself to eat. Reports weight has been relatively stable recently. Pt with hx of DM reports is compliant with metformin and checking blood glucose TID with blood sugar ranges from 85 fasted to 150 postprandial. Per patient, A1C reportedly 6.2 3-4months ago. RD educated pt on oncology nutrition and overall wellbeing. Pt reports does consume a daily multivitamin. Today is patient's first chemo treatment and will continue to assess pt's nutrition status and wt management. Pt receptive of visit. RD spent 25 minutes with patient. Past Medical History:  Past Medical History:   Diagnosis Date    AAA (abdominal aortic aneurysm) ([facility])     Dr Chang Perkins, repaired    CAD (coronary artery disease)     Dr Hermilo Patterson [facility])     bladder    Carotid artery stenosis     Central sleep apnea 10/16/2017    CHF (congestive heart failure) ([facility])     Colon polyps     Dr Michael Verde DDD (degenerative disc disease), lumbar 11/3/2015    Diabetic nephropathy with proteinuria ([address] 11/20/2015    Full dentures     Upper only    GERD (gastroesophageal reflux disease) 7/10/2014    Gout 2/7/2014    Heart attack ([address] 1984    Hx of CABG 12/9/2015    Hyperlipidemia     Hypertension     On Rx.     Mitral regurgitation 12/9/2015    Oropharyngeal dysphagia 7/10/2014    Post laminectomy syndrome 9/24/2014    Tobacco use 11/25/2014    Type 2 diabetes with nephropathy ([address] 11/20/2015    Wears glasses      Past Surgical History:   Procedure Laterality Date    ABDOMINAL AORTIC ANEURYSM REPAIR  12/02    ARTERIAL BYPASS SURGRY  1984    Double    BACK SURGERY  2000, 2003    lumbar    CARDIAC SURGERY      cabg, 12/84    COLONOSCOPY      CORONARY ARTERY BYPASS GRAFT  12/1984    CYSTOSCOPY  02/05/2018    RT RETROGRADE TUR-BT WITH MITOMYCIN INSTILLATION    CYSTOSCOPY N/A 5/21/2019    CYSTOSCOPY TRANSURETHRAL RESECTION BLADDER - CYSTO TUR BLADDER TUMOR performed by Yadira Upton MD at [address] N/A 6/27/2019    CYSTOSCOPY TRANSURETHRAL RESECTION BLADDER TUMOR performed by Shaquille Elizondo MD at [address] Right 2017    cyst removal     HERNIA REPAIR      ventral    HIATAL HERNIA REPAIR  12/1980    Hiatal    KNEE ARTHROSCOPY Left 2005    KNEE ARTHROSCOPY Right 2006    KNEE SURGERY Left 2008    MALIGNANT SKIN LESION EXCISION Right 04/27/2017    Dr. Dean Mandujano  2004    abdominal muscle repair by Dr. Nav Silva Left 2012    SC CYSTOURETHROSCOPY,FULGUR .5-2CM LESN N/A 2/5/2018    CYSTO, BILATERAL RETROGRADE PYELOGRAM, TUR BLADDER TUMOR WITH GYRUS INSTALLATION OF MITOMYCIN performed by Yadira Upton MD at [address] Right 2009    neck, benign    VENTRAL HERNIA REPAIR Left 10/1997    Ventral    WRIST GANGLION EXCISION Left 2008        Anthropometric Measures:  Current Weight:   107.9kg  Ideal Body Weight: 78.18 kg  Ideal Body Weight %: 138%  BMI 33.2 which is classified as obese class II    Nutritional Requirements:  Estimated Energy Needs:  Weight Used: 107.9kg   Method Used: [place] [employer]  Estimated kcal Needs: 2150 kcal per day  Protein Needs:  Weight used: 107.9 kg  1.0 g/kg = 108 g per day    Nutrition-focused Physical Findings   GI symptoms: poor appetite  Skin:  Intact    Nutrition Diagnosis and Goal  Problem:  Increased nutrient needs  Etiology/related to: catabolic illness  Symptoms/Signs/as evidenced by: reported poor appetite, undergoing chemo therapy.        Goal: Adequate intake to aide in wt maintenaince, signs and symptoms management  Progress towards goal: unchanged  Education Needs: none at present time, provided general ed    Malnutrition Assessment  · Patient is at risk for malnutrition based on a history of weight loss and current intake. Per AND/ASPEN guidelines, will monitor for additional RD, RN and MD documentation re weight status, nutritional intake, fluid accumulation, possible loss of body fat or muscle mass, or possible reduced  strength. NUTRITION RECOMMENDATIONS / MONITORING / EVALUATION  1.  Monitor wts, labs, s/s management    Penny Roman RD, LD  Registered Dietitian  [address] [facility] [address]: [phone] // C: [phone]

## 2019-07-12 NOTE — PROGRESS NOTES
DIAGNOSIS:   Bladder cancer 02/2018, high grade muscle invasive 05/2019 T3 N0 M0  Questionable lesion of right sixth rib  CHF ejection fraction 40-45%  CKD    CURRENT THERAPY:   Neoadjuvant chemotherapy using split dose cisplatin and gemcitabine 1000 mg/m². BRIEF CASE HISTORY:  Iram Nesbitt is a very pleasant 75 y.o. male who is referred to us for consultation and management of bladder cancer. 02/2018 pathology showed bladder cancer and TURBT was done. He had recurrence 05/2019 with muscle invasive disease. He has received BCG in the past.   He has history of cardiac disease and extensive surgical history. He reports his last ECHO showed EF 30% and he takes Lasix once daily, and isn't candidate for devices until EF is at 25%. He is allergic to iodine contrast. He denies any bone pain. He has metal in the body including rods, screws, and wires from past procedures - he is not candidate for MRI. He denies past history of poor cell counts. He denies past pneumonia, hearing loss, and neuropathy. He smokes 1/2 pack daily. Plan for 4 cycles of chemo neoadjuvant to cystectomy. Repeat TURBT in June 2019 showed no residual invasive cancer and showed high-grade urothelial dysplasia with inflammation. Interval history:  Patient presents to the clinic for a follow-up visit and to discuss further treatment plan. Since last office visit patient underwent bone scan which shows questionable lesion in the right sixth rib however there is no other evidence of metastatic disease. Overall patient is doing well. He underwent TURBT which did not show any residual tumor. Patient denies nausea vomiting fever chills. Denies pain. CHF is compensated. During this visit patient's allergy, social, medical, surgical history and medications were reviewed and updated.     PAST MEDICAL HISTORY: has a past medical history of AAA (abdominal aortic aneurysm) ([facility] [address] CAD (coronary artery disease), Cancer ([facility] [address] Carotid artery conditions. SOCIAL HISTORY:  reports that he has been smoking cigarettes. He started smoking about 152 years ago. He has a 25.00 pack-year smoking history. He has never used smokeless tobacco. He reports that he does not drink alcohol or use drugs. REVIEW OF SYSTEMS:     PHYSICAL EXAM: Shows a well appearing 75y.o.-year-old male who is not in pain or distress. Vital Signs: Blood pressure 120/70, pulse 62, temperature 98 °F (36.7 °C), temperature source Oral, resp. rate 18, weight 237 lb 12.8 oz (107.9 kg). HEENT: Normocephalic and atraumatic. Pupils are equal, round, reactive to light and accommodation. Extraocular muscles are intact. Neck: Showed no JVD, no carotid bruit . Lungs: Clear to auscultation bilaterally. Heart: Regular without any murmur. Abdomen: Soft, nontender. No hepatosplenomegaly. Extremities: Lower extremities show no edema, clubbing, or cyanosis. Breasts: Examination not done today. Neuro exam: intact cranial nerves bilaterally no motor or sensory deficit, gait is normal. Lymphatic: no adenopathy appreciated in the supraclavicular, axillary, cervical or inguinal area    REVIEW OF LABORATORY DATA:   Lab Results   Component Value Date    WBC 8.4 06/17/2019    HGB 11.6 (L) 06/17/2019    HCT 34.3 (L) 06/17/2019    .5 (H) 06/17/2019     07/01/2019       Chemistry        Component Value Date/Time     06/24/2019 0759    K 4.2 06/24/2019 0759    CL 97 (L) 06/24/2019 0759    CO2 26 06/24/2019 0759    BUN 25 (H) 06/24/2019 0759    CREATININE 1.42 (H) 06/24/2019 0759        Component Value Date/Time    CALCIUM 9.4 06/24/2019 0759    ALKPHOS 68 06/24/2019 0759    AST 11 06/24/2019 0759    ALT 10 06/24/2019 0759    BILITOT 0.36 06/24/2019 0759        Echocardiogram 4/2019:    Ejection fraction 40-45%    REVIEW OF RADIOLOGICAL RESULTS:   05/30/2019 CT CHEST ABDOMEN PELVIS IMPRESSION:   1. Pulmonary fibrosis with scattered areas of honeycombing in bibasilar   traction bronchiectasis. 2. Scattered small mediastinal lymph nodes up to a cm in size. 3. No pulmonary nodules. 4. Interval progression of anterior bladder wall thickening representing   known bladder neoplasm.  Additionally there is quite pronounced new anterior   perivesical fat stranding and a 5 mm pelvic lymph node to the right. Combination of findings would indicate extravesical extension. Bone scan 6/2019:    Impression   Focal radiotracer uptake corresponding to an anterior right rib, presumably   the 6th.  This could represent inflammation if the patient has had a recent   history of injury or trauma.  Isolated metastatic disease can not be excluded.             PATHOLOGY:   05/22/2019:  BLADDER, TURBT: INVASIVE, HIGH-GRADE UROTHELIAL CARCINOMA INVASION OF MUSCULARIS PROPRIA NOTED   HIGH-GRADE UROTHELIAL DYSPLASIA (CARCINOMA IN SITU)      6/2019    Final Diagnosis   BLADDER, TRANSURETHRAL RESECTION:          NO RESIDUAL INVASIVE CARCINOMA IDENTIFIED     HIGH GRADE UROTHELIAL DYSPLASIA (CARCINOMA IN SITU)     EXTENSIVE ACUTE INFLAMMATION, EDEMA AND NECROSIS     MUSCULARIS PROPRIA (DETRUSOR MUSCLE) PRESENT     IMPRESSION:   Bladder cancer T3 N0 M0  CHF, ejection fraction 40-45%  CKD  Tobacco addiction    PLAN:   I had a detailed discussion with the patient and his wife. I personally went over results of his bone scan. There is a questionable isolated lesion on the sixth rib. In light of no other evidence of metastatic disease we will continue to monitor the lesion. Discussed role of neoadjuvant chemotherapy for bladder cancer. Repeat TURBT does not show any evidence of residual invasive tumor  Given borderline renal function will use split dose cisplatin. We will also cautiously hydrate patient in between treatments. Patient was advised to monitor his weight twice a week. We anticipate to treat patient with 4 cycles of neoadjuvant chemotherapy before proceeding to surgery.   We will closely monitor patient for

## 2019-07-16 ENCOUNTER — HOSPITAL ENCOUNTER (OUTPATIENT)
Dept: INFUSION THERAPY | Age: 74
Discharge: HOME | End: 2019-07-16
Payer: MEDICARE

## 2019-07-16 VITALS
SYSTOLIC BLOOD PRESSURE: 119 MMHG | RESPIRATION RATE: 16 BRPM | DIASTOLIC BLOOD PRESSURE: 75 MMHG | TEMPERATURE: 97.7 F | HEART RATE: 62 BPM

## 2019-07-16 DIAGNOSIS — C67.9 MALIGNANT NEOPLASM OF URINARY BLADDER, UNSPECIFIED SITE (HCC): Primary | ICD-10-CM

## 2019-07-16 LAB
ABSOLUTE EOS #: 0.2 K/UL (ref 0–0.4)
ABSOLUTE IMMATURE GRANULOCYTE: ABNORMAL K/UL (ref 0–0.3)
ABSOLUTE LYMPH #: 1.9 K/UL (ref 1–4.8)
ABSOLUTE MONO #: 0.1 K/UL (ref 0.1–1.2)
ALBUMIN SERPL-MCNC: 3.8 G/DL (ref 3.5–5.2)
ALBUMIN/GLOBULIN RATIO: 1 (ref 1–2.5)
ALP BLD-CCNC: 62 U/L (ref 40–129)
ALT SERPL-CCNC: 18 U/L (ref 5–41)
ANION GAP SERPL CALCULATED.3IONS-SCNC: 13 MMOL/L (ref 9–17)
AST SERPL-CCNC: 18 U/L
BASOPHILS # BLD: 1 % (ref 0–2)
BASOPHILS ABSOLUTE: 0 K/UL (ref 0–0.2)
BILIRUB SERPL-MCNC: 0.42 MG/DL (ref 0.3–1.2)
BUN BLDV-MCNC: 38 MG/DL (ref 8–23)
BUN/CREAT BLD: ABNORMAL (ref 9–20)
CALCIUM SERPL-MCNC: 9.5 MG/DL (ref 8.6–10.4)
CHLORIDE BLD-SCNC: 98 MMOL/L (ref 98–107)
CO2: 26 MMOL/L (ref 20–31)
CREAT SERPL-MCNC: 1.35 MG/DL (ref 0.7–1.2)
DIFFERENTIAL TYPE: ABNORMAL
EOSINOPHILS RELATIVE PERCENT: 3 % (ref 1–4)
GFR AFRICAN AMERICAN: >60 ML/MIN
GFR NON-AFRICAN AMERICAN: 52 ML/MIN
GFR SERPL CREATININE-BSD FRML MDRD: ABNORMAL ML/MIN/{1.73_M2}
GFR SERPL CREATININE-BSD FRML MDRD: ABNORMAL ML/MIN/{1.73_M2}
GLUCOSE BLD-MCNC: 112 MG/DL (ref 70–99)
HCT VFR BLD CALC: 33.1 % (ref 41–53)
HEMOGLOBIN: 11.5 G/DL (ref 13.5–17.5)
IMMATURE GRANULOCYTES: ABNORMAL %
LYMPHOCYTES # BLD: 33 % (ref 24–44)
MAGNESIUM: 2.3 MG/DL (ref 1.6–2.6)
MCH RBC QN AUTO: 34.6 PG (ref 26–34)
MCHC RBC AUTO-ENTMCNC: 34.6 G/DL (ref 31–37)
MCV RBC AUTO: 99.8 FL (ref 80–100)
MONOCYTES # BLD: 2 % (ref 2–11)
NRBC AUTOMATED: ABNORMAL PER 100 WBC
PDW BLD-RTO: 16.6 % (ref 12.5–15.4)
PLATELET # BLD: 252 K/UL (ref 140–450)
PLATELET ESTIMATE: ABNORMAL
PMV BLD AUTO: 7.9 FL (ref 6–12)
POTASSIUM SERPL-SCNC: 5.1 MMOL/L (ref 3.7–5.3)
RBC # BLD: 3.32 M/UL (ref 4.5–5.9)
RBC # BLD: ABNORMAL 10*6/UL
SEG NEUTROPHILS: 61 % (ref 36–66)
SEGMENTED NEUTROPHILS ABSOLUTE COUNT: 3.4 K/UL (ref 1.8–7.7)
SODIUM BLD-SCNC: 137 MMOL/L (ref 135–144)
TOTAL PROTEIN: 7.5 G/DL (ref 6.4–8.3)
WBC # BLD: 5.6 K/UL (ref 3.5–11)
WBC # BLD: ABNORMAL 10*3/UL

## 2019-07-16 PROCEDURE — 36591 DRAW BLOOD OFF VENOUS DEVICE: CPT

## 2019-07-16 PROCEDURE — 2580000003 HC RX 258: Performed by: INTERNAL MEDICINE

## 2019-07-16 PROCEDURE — 83735 ASSAY OF MAGNESIUM: CPT

## 2019-07-16 PROCEDURE — 96360 HYDRATION IV INFUSION INIT: CPT

## 2019-07-16 PROCEDURE — 6360000002 HC RX W HCPCS: Performed by: INTERNAL MEDICINE

## 2019-07-16 PROCEDURE — 85025 COMPLETE CBC W/AUTO DIFF WBC: CPT

## 2019-07-16 PROCEDURE — 80053 COMPREHEN METABOLIC PANEL: CPT

## 2019-07-16 RX ORDER — 0.9 % SODIUM CHLORIDE 0.9 %
500 INTRAVENOUS SOLUTION INTRAVENOUS ONCE
Status: COMPLETED | OUTPATIENT
Start: 2019-07-16 | End: 2019-07-16

## 2019-07-16 RX ORDER — SODIUM CHLORIDE 0.9 % (FLUSH) 0.9 %
10 SYRINGE (ML) INJECTION PRN
Status: DISCONTINUED | OUTPATIENT
Start: 2019-07-16 | End: 2019-07-17 | Stop reason: HOSPADM

## 2019-07-16 RX ORDER — SODIUM CHLORIDE 0.9 % (FLUSH) 0.9 %
20 SYRINGE (ML) INJECTION PRN
Status: CANCELLED | OUTPATIENT
Start: 2019-07-16

## 2019-07-16 RX ORDER — SODIUM CHLORIDE 0.9 % (FLUSH) 0.9 %
10 SYRINGE (ML) INJECTION PRN
Status: CANCELLED | OUTPATIENT
Start: 2019-07-16

## 2019-07-16 RX ORDER — HEPARIN SODIUM (PORCINE) LOCK FLUSH IV SOLN 100 UNIT/ML 100 UNIT/ML
500 SOLUTION INTRAVENOUS PRN
Status: CANCELLED | OUTPATIENT
Start: 2019-07-16

## 2019-07-16 RX ORDER — HEPARIN SODIUM (PORCINE) LOCK FLUSH IV SOLN 100 UNIT/ML 100 UNIT/ML
500 SOLUTION INTRAVENOUS PRN
Status: DISCONTINUED | OUTPATIENT
Start: 2019-07-16 | End: 2019-07-17 | Stop reason: HOSPADM

## 2019-07-16 RX ADMIN — Medication 10 ML: at 17:32

## 2019-07-16 RX ADMIN — SODIUM CHLORIDE 500 ML: 9 INJECTION, SOLUTION INTRAVENOUS at 15:19

## 2019-07-16 RX ADMIN — Medication 500 UNITS: at 17:32

## 2019-07-16 NOTE — PROGRESS NOTES
Pt here for C.1D.5 500ml hydration. Reports feeling extremely fatigued and states has tremors in his hands. Reports both started Sunday 7/14/19. Labs drawn from port and results reviewed. Discussed with juliette Shearer to offer pt additional 500ml fluid this Friday. Pt agreeable. Pharmacy notified to add 500ml bolus to tx plan. Pt was given hydration without incident and d/c'd in stable condition. He will return this Friday for MD bazan/dorinda, C.1D.8 tx and hydration.

## 2019-07-18 NOTE — PROGRESS NOTES
Addendum to note from 7/16/19:  Noted pt already on schedule Friday, 7/18/19 for tx with hydration. Pharmacy notified.

## 2019-07-19 ENCOUNTER — OFFICE VISIT (OUTPATIENT)
Dept: ONCOLOGY | Age: 74
End: 2019-07-19
Payer: MEDICARE

## 2019-07-19 ENCOUNTER — HOSPITAL ENCOUNTER (OUTPATIENT)
Dept: INFUSION THERAPY | Age: 74
Discharge: HOME | End: 2019-07-19
Payer: MEDICARE

## 2019-07-19 VITALS
BODY MASS INDEX: 32.65 KG/M2 | SYSTOLIC BLOOD PRESSURE: 120 MMHG | WEIGHT: 234 LBS | DIASTOLIC BLOOD PRESSURE: 74 MMHG | TEMPERATURE: 98.6 F | HEART RATE: 64 BPM

## 2019-07-19 VITALS
RESPIRATION RATE: 16 BRPM | DIASTOLIC BLOOD PRESSURE: 74 MMHG | BODY MASS INDEX: 32.68 KG/M2 | WEIGHT: 234.2 LBS | TEMPERATURE: 98.6 F | SYSTOLIC BLOOD PRESSURE: 120 MMHG | HEART RATE: 64 BPM

## 2019-07-19 DIAGNOSIS — Z13.29 SCREENING FOR THYROID DISORDER: ICD-10-CM

## 2019-07-19 DIAGNOSIS — Z13.0 SCREENING FOR DEFICIENCY ANEMIA: ICD-10-CM

## 2019-07-19 DIAGNOSIS — E11.21 TYPE 2 DIABETES WITH NEPHROPATHY (HCC): ICD-10-CM

## 2019-07-19 DIAGNOSIS — C67.9 MALIGNANT NEOPLASM OF URINARY BLADDER, UNSPECIFIED SITE (HCC): Primary | ICD-10-CM

## 2019-07-19 DIAGNOSIS — E61.1 IRON DEFICIENCY: ICD-10-CM

## 2019-07-19 DIAGNOSIS — R53.1 ASTHENIA: ICD-10-CM

## 2019-07-19 DIAGNOSIS — N18.30 STAGE 3 CHRONIC KIDNEY DISEASE (HCC): ICD-10-CM

## 2019-07-19 DIAGNOSIS — I50.22 CHRONIC SYSTOLIC CONGESTIVE HEART FAILURE (HCC): ICD-10-CM

## 2019-07-19 LAB
ABSOLUTE EOS #: 0 K/UL (ref 0–0.4)
ABSOLUTE IMMATURE GRANULOCYTE: ABNORMAL K/UL (ref 0–0.3)
ABSOLUTE LYMPH #: 1.3 K/UL (ref 1–4.8)
ABSOLUTE MONO #: 0.1 K/UL (ref 0.1–1.2)
ALBUMIN SERPL-MCNC: 3.9 G/DL (ref 3.5–5.2)
ALBUMIN SERPL-MCNC: 3.9 G/DL (ref 3.5–5.2)
ALBUMIN/GLOBULIN RATIO: 1.1 (ref 1–2.5)
ALBUMIN/GLOBULIN RATIO: 1.1 (ref 1–2.5)
ALP BLD-CCNC: 67 U/L (ref 40–129)
ALP BLD-CCNC: 67 U/L (ref 40–129)
ALT SERPL-CCNC: 15 U/L (ref 5–41)
ALT SERPL-CCNC: 15 U/L (ref 5–41)
ANION GAP SERPL CALCULATED.3IONS-SCNC: 15 MMOL/L (ref 9–17)
ANION GAP SERPL CALCULATED.3IONS-SCNC: 15 MMOL/L (ref 9–17)
AST SERPL-CCNC: 16 U/L
AST SERPL-CCNC: 16 U/L
BASOPHILS # BLD: 1 % (ref 0–2)
BASOPHILS ABSOLUTE: 0 K/UL (ref 0–0.2)
BILIRUB SERPL-MCNC: 0.32 MG/DL (ref 0.3–1.2)
BILIRUB SERPL-MCNC: 0.32 MG/DL (ref 0.3–1.2)
BUN BLDV-MCNC: 34 MG/DL (ref 8–23)
BUN BLDV-MCNC: 34 MG/DL (ref 8–23)
BUN/CREAT BLD: ABNORMAL (ref 9–20)
BUN/CREAT BLD: ABNORMAL (ref 9–20)
CALCIUM SERPL-MCNC: 9.2 MG/DL (ref 8.6–10.4)
CALCIUM SERPL-MCNC: 9.2 MG/DL (ref 8.6–10.4)
CHLORIDE BLD-SCNC: 96 MMOL/L (ref 98–107)
CHLORIDE BLD-SCNC: 96 MMOL/L (ref 98–107)
CO2: 24 MMOL/L (ref 20–31)
CO2: 24 MMOL/L (ref 20–31)
CREAT SERPL-MCNC: 1.43 MG/DL (ref 0.7–1.2)
CREAT SERPL-MCNC: 1.43 MG/DL (ref 0.7–1.2)
DIFFERENTIAL TYPE: ABNORMAL
EOSINOPHILS RELATIVE PERCENT: 1 % (ref 1–4)
ESTIMATED AVERAGE GLUCOSE: 154 MG/DL
GFR AFRICAN AMERICAN: 59 ML/MIN
GFR AFRICAN AMERICAN: 59 ML/MIN
GFR NON-AFRICAN AMERICAN: 48 ML/MIN
GFR NON-AFRICAN AMERICAN: 48 ML/MIN
GFR SERPL CREATININE-BSD FRML MDRD: ABNORMAL ML/MIN/{1.73_M2}
GLUCOSE BLD-MCNC: 185 MG/DL (ref 70–99)
GLUCOSE BLD-MCNC: 185 MG/DL (ref 70–99)
HBA1C MFR BLD: 7 % (ref 4–6)
HCT VFR BLD CALC: 32.5 % (ref 41–53)
HCT VFR BLD CALC: 32.5 % (ref 41–53)
HEMOGLOBIN: 11.3 G/DL (ref 13.5–17.5)
HEMOGLOBIN: 11.3 G/DL (ref 13.5–17.5)
IMMATURE GRANULOCYTES: ABNORMAL %
IRON SATURATION: 14 % (ref 20–55)
IRON: 34 UG/DL (ref 59–158)
LYMPHOCYTES # BLD: 43 % (ref 24–44)
MAGNESIUM: 1.9 MG/DL (ref 1.6–2.6)
MCH RBC QN AUTO: 34.5 PG (ref 26–34)
MCH RBC QN AUTO: 34.5 PG (ref 26–34)
MCHC RBC AUTO-ENTMCNC: 34.6 G/DL (ref 31–37)
MCHC RBC AUTO-ENTMCNC: 34.6 G/DL (ref 31–37)
MCV RBC AUTO: 99.8 FL (ref 80–100)
MCV RBC AUTO: 99.8 FL (ref 80–100)
MONOCYTES # BLD: 3 % (ref 2–11)
NRBC AUTOMATED: ABNORMAL PER 100 WBC
NRBC AUTOMATED: ABNORMAL PER 100 WBC
PDW BLD-RTO: 16.7 % (ref 12.5–15.4)
PDW BLD-RTO: 16.7 % (ref 12.5–15.4)
PLATELET # BLD: 151 K/UL (ref 140–450)
PLATELET # BLD: 151 K/UL (ref 140–450)
PLATELET ESTIMATE: ABNORMAL
PMV BLD AUTO: 7.6 FL (ref 6–12)
PMV BLD AUTO: 7.6 FL (ref 6–12)
POTASSIUM SERPL-SCNC: 4.8 MMOL/L (ref 3.7–5.3)
POTASSIUM SERPL-SCNC: 4.8 MMOL/L (ref 3.7–5.3)
RBC # BLD: 3.26 M/UL (ref 4.5–5.9)
RBC # BLD: 3.26 M/UL (ref 4.5–5.9)
RBC # BLD: ABNORMAL 10*6/UL
SEG NEUTROPHILS: 52 % (ref 36–66)
SEGMENTED NEUTROPHILS ABSOLUTE COUNT: 1.6 K/UL (ref 1.8–7.7)
SODIUM BLD-SCNC: 135 MMOL/L (ref 135–144)
SODIUM BLD-SCNC: 135 MMOL/L (ref 135–144)
TOTAL IRON BINDING CAPACITY: 246 UG/DL (ref 250–450)
TOTAL PROTEIN: 7.4 G/DL (ref 6.4–8.3)
TOTAL PROTEIN: 7.4 G/DL (ref 6.4–8.3)
TSH SERPL DL<=0.05 MIU/L-ACNC: 1.91 MIU/L (ref 0.3–5)
UNSATURATED IRON BINDING CAPACITY: 212 UG/DL (ref 112–347)
WBC # BLD: 3 K/UL (ref 3.5–11)
WBC # BLD: 3 K/UL (ref 3.5–11)
WBC # BLD: ABNORMAL 10*3/UL

## 2019-07-19 PROCEDURE — 2580000003 HC RX 258: Performed by: INTERNAL MEDICINE

## 2019-07-19 PROCEDURE — 83735 ASSAY OF MAGNESIUM: CPT

## 2019-07-19 PROCEDURE — 96367 TX/PROPH/DG ADDL SEQ IV INF: CPT

## 2019-07-19 PROCEDURE — 83550 IRON BINDING TEST: CPT

## 2019-07-19 PROCEDURE — 96413 CHEMO IV INFUSION 1 HR: CPT

## 2019-07-19 PROCEDURE — G8427 DOCREV CUR MEDS BY ELIG CLIN: HCPCS | Performed by: INTERNAL MEDICINE

## 2019-07-19 PROCEDURE — 4040F PNEUMOC VAC/ADMIN/RCVD: CPT | Performed by: INTERNAL MEDICINE

## 2019-07-19 PROCEDURE — 6360000002 HC RX W HCPCS: Performed by: INTERNAL MEDICINE

## 2019-07-19 PROCEDURE — 99214 OFFICE O/P EST MOD 30 MIN: CPT | Performed by: INTERNAL MEDICINE

## 2019-07-19 PROCEDURE — 85025 COMPLETE CBC W/AUTO DIFF WBC: CPT

## 2019-07-19 PROCEDURE — 96417 CHEMO IV INFUS EACH ADDL SEQ: CPT

## 2019-07-19 PROCEDURE — 83540 ASSAY OF IRON: CPT

## 2019-07-19 PROCEDURE — 83036 HEMOGLOBIN GLYCOSYLATED A1C: CPT

## 2019-07-19 PROCEDURE — 96361 HYDRATE IV INFUSION ADD-ON: CPT

## 2019-07-19 PROCEDURE — 36591 DRAW BLOOD OFF VENOUS DEVICE: CPT

## 2019-07-19 PROCEDURE — 1123F ACP DISCUSS/DSCN MKR DOCD: CPT | Performed by: INTERNAL MEDICINE

## 2019-07-19 PROCEDURE — 80053 COMPREHEN METABOLIC PANEL: CPT

## 2019-07-19 PROCEDURE — 84443 ASSAY THYROID STIM HORMONE: CPT

## 2019-07-19 PROCEDURE — 96375 TX/PRO/DX INJ NEW DRUG ADDON: CPT

## 2019-07-19 PROCEDURE — 3017F COLORECTAL CA SCREEN DOC REV: CPT | Performed by: INTERNAL MEDICINE

## 2019-07-19 PROCEDURE — G8417 CALC BMI ABV UP PARAM F/U: HCPCS | Performed by: INTERNAL MEDICINE

## 2019-07-19 PROCEDURE — G8598 ASA/ANTIPLAT THER USED: HCPCS | Performed by: INTERNAL MEDICINE

## 2019-07-19 PROCEDURE — 4004F PT TOBACCO SCREEN RCVD TLK: CPT | Performed by: INTERNAL MEDICINE

## 2019-07-19 RX ORDER — DEXAMETHASONE SODIUM PHOSPHATE 10 MG/ML
10 INJECTION INTRAMUSCULAR; INTRAVENOUS ONCE
Status: COMPLETED | OUTPATIENT
Start: 2019-07-19 | End: 2019-07-19

## 2019-07-19 RX ORDER — SODIUM CHLORIDE 0.9 % (FLUSH) 0.9 %
10 SYRINGE (ML) INJECTION PRN
Status: DISCONTINUED | OUTPATIENT
Start: 2019-07-19 | End: 2019-07-20 | Stop reason: HOSPADM

## 2019-07-19 RX ORDER — 0.9 % SODIUM CHLORIDE 0.9 %
500 INTRAVENOUS SOLUTION INTRAVENOUS ONCE
Status: CANCELLED
Start: 2019-07-23

## 2019-07-19 RX ORDER — SODIUM CHLORIDE 9 MG/ML
20 INJECTION, SOLUTION INTRAVENOUS ONCE
Status: COMPLETED | OUTPATIENT
Start: 2019-07-19 | End: 2019-07-19

## 2019-07-19 RX ORDER — HEPARIN SODIUM (PORCINE) LOCK FLUSH IV SOLN 100 UNIT/ML 100 UNIT/ML
500 SOLUTION INTRAVENOUS PRN
Status: DISCONTINUED | OUTPATIENT
Start: 2019-07-19 | End: 2019-07-20 | Stop reason: HOSPADM

## 2019-07-19 RX ORDER — PALONOSETRON 0.05 MG/ML
0.25 INJECTION, SOLUTION INTRAVENOUS ONCE
Status: COMPLETED | OUTPATIENT
Start: 2019-07-19 | End: 2019-07-19

## 2019-07-19 RX ADMIN — GEMCITABINE 2330 MG: 38 INJECTION, SOLUTION INTRAVENOUS at 12:48

## 2019-07-19 RX ADMIN — Medication 10 ML: at 15:23

## 2019-07-19 RX ADMIN — POTASSIUM CHLORIDE: 2 INJECTION, SOLUTION, CONCENTRATE INTRAVENOUS at 11:02

## 2019-07-19 RX ADMIN — Medication 10 ML: at 10:43

## 2019-07-19 RX ADMIN — DEXAMETHASONE SODIUM PHOSPHATE 10 MG: 10 INJECTION INTRAMUSCULAR; INTRAVENOUS at 12:44

## 2019-07-19 RX ADMIN — CISPLATIN: 1 INJECTION INTRAVENOUS at 13:19

## 2019-07-19 RX ADMIN — SODIUM CHLORIDE 20 ML/HR: 9 INJECTION, SOLUTION INTRAVENOUS at 11:02

## 2019-07-19 RX ADMIN — Medication 500 UNITS: at 15:23

## 2019-07-19 RX ADMIN — SODIUM CHLORIDE 150 MG: 900 INJECTION, SOLUTION INTRAVENOUS at 12:05

## 2019-07-19 RX ADMIN — Medication 10 ML: at 10:44

## 2019-07-19 RX ADMIN — PALONOSETRON HYDROCHLORIDE 0.25 MG: 0.25 INJECTION, SOLUTION INTRAVENOUS at 12:43

## 2019-07-19 RX ADMIN — POTASSIUM CHLORIDE: 2 INJECTION, SOLUTION, CONCENTRATE INTRAVENOUS at 14:24

## 2019-07-19 NOTE — PROGRESS NOTES
and iodides. FAMILY HISTORY: Negative for any hematological or oncological conditions. SOCIAL HISTORY:  reports that he has been smoking cigarettes. He started smoking about 152 years ago. He has a 25.00 pack-year smoking history. He has never used smokeless tobacco. He reports that he does not drink alcohol or use drugs. REVIEW OF SYSTEMS:     General: no fever or night sweats, Weight is stable. ENT: No double or blurred vision, no hearing problem, no dysphagia or sore throat   Respiratory: No chest pain, no shortness of breath, no cough or hemoptysis. Cardiovascular: Denies chest pain, PND or orthopnea. No L E swelling or palpitations. Gastrointestinal:    No nausea or vomiting, abdominal pain, diarrhea or constipation. Genitourinary: Denies dysuria, hematuria, frequency, urgency or incontinence. Neurological: Denies headaches, decreased LOC, no sensory or motor focal deficits. Musculoskeletal:  No arthralgia no back pain or joint swelling. Skin: There are no rashes or bleeding. Psych: Denies hallucinations or intentions to harm self      PHYSICAL EXAM: Shows a well appearing 75y.o.-year-old male who is not in pain or distress. Vital Signs: Blood pressure 120/74, pulse 64, temperature 98.6 °F (37 °C), temperature source Oral, weight 234 lb (106.1 kg). HEENT: Normocephalic and atraumatic. Pupils are equal, round, reactive to light and accommodation. Extraocular muscles are intact. Neck: Showed no JVD, no carotid bruit . Lungs: Clear to auscultation bilaterally. Heart: Regular without any murmur. Abdomen: Soft, nontender. No hepatosplenomegaly. Extremities: Lower extremities show no edema, clubbing, or cyanosis. Breasts: Examination not done today.  Neuro exam: intact cranial nerves bilaterally no motor or sensory deficit, gait is normal. Lymphatic: no adenopathy appreciated in the supraclavicular, axillary, cervical or inguinal area    REVIEW OF LABORATORY DATA:   Lab Results   Component Value

## 2019-07-19 NOTE — PROGRESS NOTES
Pt here for C1D8. Denies any new complaints other than fatigue. Labs drawn from port and results reviewed. Pt seen by Dr Blair Sequeira at chair side for follow up, refer to his note. Pt was treated without incident and d/c'd in stable condition. Pt will return on 7-23-19 for hydration.

## 2019-07-23 ENCOUNTER — HOSPITAL ENCOUNTER (OUTPATIENT)
Dept: INFUSION THERAPY | Age: 74
Discharge: HOME | End: 2019-07-23
Payer: MEDICARE

## 2019-07-23 DIAGNOSIS — C67.9 MALIGNANT NEOPLASM OF URINARY BLADDER, UNSPECIFIED SITE (HCC): Primary | ICD-10-CM

## 2019-07-23 DIAGNOSIS — E11.21 TYPE II DIABETES MELLITUS WITH NEPHROPATHY (HCC): ICD-10-CM

## 2019-07-23 PROCEDURE — 96360 HYDRATION IV INFUSION INIT: CPT

## 2019-07-23 PROCEDURE — 6360000002 HC RX W HCPCS: Performed by: INTERNAL MEDICINE

## 2019-07-23 PROCEDURE — 2580000003 HC RX 258: Performed by: INTERNAL MEDICINE

## 2019-07-23 RX ORDER — 0.9 % SODIUM CHLORIDE 0.9 %
500 INTRAVENOUS SOLUTION INTRAVENOUS ONCE
Status: COMPLETED | OUTPATIENT
Start: 2019-07-23 | End: 2019-07-23

## 2019-07-23 RX ORDER — SODIUM CHLORIDE 0.9 % (FLUSH) 0.9 %
10 SYRINGE (ML) INJECTION PRN
Status: CANCELLED | OUTPATIENT
Start: 2019-07-23

## 2019-07-23 RX ORDER — SODIUM CHLORIDE 0.9 % (FLUSH) 0.9 %
20 SYRINGE (ML) INJECTION PRN
Status: CANCELLED | OUTPATIENT
Start: 2019-07-23

## 2019-07-23 RX ORDER — HEPARIN SODIUM (PORCINE) LOCK FLUSH IV SOLN 100 UNIT/ML 100 UNIT/ML
500 SOLUTION INTRAVENOUS PRN
Status: CANCELLED | OUTPATIENT
Start: 2019-07-23

## 2019-07-23 RX ORDER — SODIUM CHLORIDE 0.9 % (FLUSH) 0.9 %
10 SYRINGE (ML) INJECTION PRN
Status: DISCONTINUED | OUTPATIENT
Start: 2019-07-23 | End: 2019-07-24 | Stop reason: HOSPADM

## 2019-07-23 RX ORDER — HEPARIN SODIUM (PORCINE) LOCK FLUSH IV SOLN 100 UNIT/ML 100 UNIT/ML
500 SOLUTION INTRAVENOUS PRN
Status: DISCONTINUED | OUTPATIENT
Start: 2019-07-23 | End: 2019-07-24 | Stop reason: HOSPADM

## 2019-07-23 RX ADMIN — Medication 10 ML: at 14:15

## 2019-07-23 RX ADMIN — SODIUM CHLORIDE 500 ML: 9 INJECTION, SOLUTION INTRAVENOUS at 14:16

## 2019-07-23 RX ADMIN — Medication 10 ML: at 15:22

## 2019-07-23 RX ADMIN — Medication 500 UNITS: at 15:22

## 2019-07-24 ENCOUNTER — TELEPHONE (OUTPATIENT)
Dept: ONCOLOGY | Age: 74
End: 2019-07-24

## 2019-07-26 ENCOUNTER — TELEPHONE (OUTPATIENT)
Dept: PRIMARY CARE CLINIC | Age: 74
End: 2019-07-26

## 2019-07-29 ENCOUNTER — HOSPITAL ENCOUNTER (OUTPATIENT)
Dept: INFUSION THERAPY | Age: 74
Discharge: HOME | End: 2019-07-29
Payer: MEDICARE

## 2019-07-29 VITALS
DIASTOLIC BLOOD PRESSURE: 69 MMHG | SYSTOLIC BLOOD PRESSURE: 109 MMHG | TEMPERATURE: 98.8 F | HEART RATE: 55 BPM | RESPIRATION RATE: 16 BRPM

## 2019-07-29 DIAGNOSIS — C67.9 MALIGNANT NEOPLASM OF URINARY BLADDER, UNSPECIFIED SITE (HCC): Primary | ICD-10-CM

## 2019-07-29 LAB
ABO/RH: NORMAL
ABSOLUTE EOS #: 0 K/UL (ref 0–0.4)
ABSOLUTE IMMATURE GRANULOCYTE: ABNORMAL K/UL (ref 0–0.3)
ABSOLUTE LYMPH #: 1.26 K/UL (ref 1–4.8)
ABSOLUTE MONO #: 0 K/UL (ref 0.1–0.8)
ANTIBODY SCREEN: NEGATIVE
ARM BAND NUMBER: NORMAL
BASOPHILS # BLD: 0 % (ref 0–2)
BASOPHILS ABSOLUTE: 0 K/UL (ref 0–0.2)
DIFFERENTIAL TYPE: ABNORMAL
EOSINOPHILS RELATIVE PERCENT: 0 % (ref 1–4)
EXPIRATION DATE: NORMAL
HCT VFR BLD CALC: 27.7 % (ref 41–53)
HEMOGLOBIN: 9.4 G/DL (ref 13.5–17.5)
IMMATURE GRANULOCYTES: ABNORMAL %
LYMPHOCYTES # BLD: 74 % (ref 24–44)
MCH RBC QN AUTO: 33.2 PG (ref 26–34)
MCHC RBC AUTO-ENTMCNC: 33.9 G/DL (ref 31–37)
MCV RBC AUTO: 97.9 FL (ref 80–100)
MONOCYTES # BLD: 0 % (ref 1–7)
MORPHOLOGY: NORMAL
NRBC AUTOMATED: ABNORMAL PER 100 WBC
PDW BLD-RTO: 14.6 % (ref 12.5–15.4)
PLATELET # BLD: 5 K/UL (ref 140–450)
PLATELET ESTIMATE: ABNORMAL
PMV BLD AUTO: ABNORMAL FL (ref 8–14)
RBC # BLD: 2.83 M/UL (ref 4.5–5.9)
RBC # BLD: ABNORMAL 10*6/UL
SEG NEUTROPHILS: 26 % (ref 36–66)
SEGMENTED NEUTROPHILS ABSOLUTE COUNT: 0.44 K/UL (ref 1.8–7.7)
WBC # BLD: 1.7 K/UL (ref 3.5–11)
WBC # BLD: ABNORMAL 10*3/UL

## 2019-07-29 PROCEDURE — 36430 TRANSFUSION BLD/BLD COMPNT: CPT | Performed by: NURSE PRACTITIONER

## 2019-07-29 PROCEDURE — 86850 RBC ANTIBODY SCREEN: CPT

## 2019-07-29 PROCEDURE — 36591 DRAW BLOOD OFF VENOUS DEVICE: CPT | Performed by: NURSE PRACTITIONER

## 2019-07-29 PROCEDURE — 86900 BLOOD TYPING SEROLOGIC ABO: CPT

## 2019-07-29 PROCEDURE — 2580000003 HC RX 258: Performed by: INTERNAL MEDICINE

## 2019-07-29 PROCEDURE — 85025 COMPLETE CBC W/AUTO DIFF WBC: CPT

## 2019-07-29 PROCEDURE — 96360 HYDRATION IV INFUSION INIT: CPT | Performed by: NURSE PRACTITIONER

## 2019-07-29 PROCEDURE — 86901 BLOOD TYPING SEROLOGIC RH(D): CPT

## 2019-07-29 PROCEDURE — P9037 PLATE PHERES LEUKOREDU IRRAD: HCPCS

## 2019-07-29 PROCEDURE — 2580000003 HC RX 258: Performed by: NURSE PRACTITIONER

## 2019-07-29 PROCEDURE — 6360000002 HC RX W HCPCS: Performed by: INTERNAL MEDICINE

## 2019-07-29 RX ORDER — SODIUM CHLORIDE 0.9 % (FLUSH) 0.9 %
10 SYRINGE (ML) INJECTION PRN
Status: CANCELLED | OUTPATIENT
Start: 2019-07-29

## 2019-07-29 RX ORDER — SODIUM CHLORIDE 0.9 % (FLUSH) 0.9 %
5 SYRINGE (ML) INJECTION PRN
Status: CANCELLED | OUTPATIENT
Start: 2019-07-29

## 2019-07-29 RX ORDER — HEPARIN SODIUM (PORCINE) LOCK FLUSH IV SOLN 100 UNIT/ML 100 UNIT/ML
500 SOLUTION INTRAVENOUS PRN
Status: DISCONTINUED | OUTPATIENT
Start: 2019-07-29 | End: 2019-07-30 | Stop reason: HOSPADM

## 2019-07-29 RX ORDER — SODIUM CHLORIDE 0.9 % (FLUSH) 0.9 %
20 SYRINGE (ML) INJECTION PRN
Status: CANCELLED | OUTPATIENT
Start: 2019-07-29

## 2019-07-29 RX ORDER — 0.9 % SODIUM CHLORIDE 0.9 %
500 INTRAVENOUS SOLUTION INTRAVENOUS ONCE
Status: CANCELLED | OUTPATIENT
Start: 2019-07-29

## 2019-07-29 RX ORDER — 0.9 % SODIUM CHLORIDE 0.9 %
250 INTRAVENOUS SOLUTION INTRAVENOUS ONCE
Status: COMPLETED | OUTPATIENT
Start: 2019-07-29 | End: 2019-07-29

## 2019-07-29 RX ORDER — HEPARIN SODIUM (PORCINE) LOCK FLUSH IV SOLN 100 UNIT/ML 100 UNIT/ML
500 SOLUTION INTRAVENOUS PRN
Status: CANCELLED | OUTPATIENT
Start: 2019-07-29

## 2019-07-29 RX ORDER — 0.9 % SODIUM CHLORIDE 0.9 %
250 INTRAVENOUS SOLUTION INTRAVENOUS ONCE
Status: DISCONTINUED | OUTPATIENT
Start: 2019-07-29 | End: 2019-08-02 | Stop reason: HOSPADM

## 2019-07-29 RX ORDER — 0.9 % SODIUM CHLORIDE 0.9 %
500 INTRAVENOUS SOLUTION INTRAVENOUS ONCE
Status: COMPLETED | OUTPATIENT
Start: 2019-07-29 | End: 2019-07-29

## 2019-07-29 RX ORDER — SODIUM CHLORIDE 0.9 % (FLUSH) 0.9 %
10 SYRINGE (ML) INJECTION PRN
Status: DISCONTINUED | OUTPATIENT
Start: 2019-07-29 | End: 2019-07-30 | Stop reason: HOSPADM

## 2019-07-29 RX ADMIN — SODIUM CHLORIDE 500 ML: 9 INJECTION, SOLUTION INTRAVENOUS at 10:15

## 2019-07-29 RX ADMIN — Medication 10 ML: at 13:33

## 2019-07-29 RX ADMIN — Medication 500 UNITS: at 13:33

## 2019-07-29 RX ADMIN — Medication 10 ML: at 10:14

## 2019-07-29 RX ADMIN — Medication 10 ML: at 10:08

## 2019-07-29 RX ADMIN — SODIUM CHLORIDE 250 ML: 9 INJECTION, SOLUTION INTRAVENOUS at 11:19

## 2019-07-29 NOTE — PROGRESS NOTES
Patient her for hydration, post c1d8 given on 7/19/19. He receives cisplatin and gemzar. Patient stated he had some new symptoms over the weekend. He had a bloody nose from the right nostril multiple times on Saturday. Denied profuse bleeding. He then showed me a rash on his chest and right shin. He explained that it was all over him. He stated it is smooth , does not itch. I explained it was not a rash but something we call petechia. It can be caused by low plts. Which is the best scenario. But it also could be vasculitis which is more serious. It is a reaction caused by one iof his chemo drugs. [facility] NP was asked to see the patient. She agreed with my assessment, cbc drawn to check plts. She explained to the patient we would wait for the plt count and go from there. Hydration started as ordered. Patient tolerated hydration well. His plt ct 5. 1 unit platlets order. Patient updated and agreeable to having plt transfusion. Patient tolerated platlet infusion well. Stable and ambulatory at d/c. Reviewed bleeding precautions, when to go to the ED. Patient verbalized understanding. He is due back Friday 8/2/19 for office visit with Dr. Neida Aranda and chemo to follow that. Labs will be repeated at that time. Patient stable and ambulatory at d/c.

## 2019-07-29 NOTE — TELEPHONE ENCOUNTER
LAST OV 2/25/19    Health Maintenance   Topic Date Due    DTaP/Tdap/Td vaccine (1 - Tdap) 11/[birth year]/1963    Annual Wellness Visit (AWV)  11/25/2007    Shingles Vaccine (2 of 3) 06/25/2015    Diabetic retinal exam  04/12/2017    Diabetic foot exam  02/01/2018    Lipid screen  08/01/2019    Flu vaccine (1) 09/01/2019    A1C test (Diabetic or Prediabetic)  07/19/2020    Potassium monitoring  07/19/2020    Creatinine monitoring  07/19/2020    Colon cancer screen colonoscopy  05/13/2021    Pneumococcal 65+ years Vaccine  Completed             (applicable per patient's age: Cancer Screenings, Depression Screening, Fall Risk Screening, Immunizations)    Hemoglobin A1C (%)   Date Value   07/19/2019 7.0 (H)   02/25/2019 6.3   08/22/2018 6.5     LDL Cholesterol (mg/dL)   Date Value   08/01/2018 61     LDL Calculated (mg/dL)   Date Value   11/18/2015 74     AST (U/L)   Date Value   07/19/2019 16   07/19/2019 16     ALT (U/L)   Date Value   07/19/2019 15   07/19/2019 15     BUN (mg/dL)   Date Value   07/19/2019 34 (H)   07/19/2019 34 (H)      (goal A1C is < 7)   (goal LDL is <100) need 30-50% reduction from baseline     BP Readings from Last 3 Encounters:   07/19/19 120/74   07/19/19 120/74   07/16/19 119/75    (goal /80)      All Future Testing planned in [facility]:  Lab Frequency Next Occurrence   Lipid Panel Once 02/25/2020   Comprehensive Metabolic Panel Once 86/01/3392   CBC With Auto Differential     Comprehensive Metabolic Panel     Magnesium         Next Visit Date:  Future Appointments   Date Time Provider Diandra Horton   7/29/2019 10:00 AM STV PB MED ONC CHAIR 07 STVZ PB [facility] None   8/2/2019  9:20 AM Bruce Gilbert MD [facility]   8/2/2019 10:00 AM STV PB MED ONC CHAIR 12 STVZ PB [facility] None   8/6/2019  2:00 PM STV PB MED ONC CHAIR 08 STVZ PB [facility] None   8/9/2019 10:00 AM STV PB MED ONC CHAIR 07 STVZ PB [facility] None   8/12/2019  8:20 AM [facility]   8/22/2019

## 2019-07-30 ENCOUNTER — TELEPHONE (OUTPATIENT)
Dept: ONCOLOGY | Age: 74
End: 2019-07-30

## 2019-07-31 LAB
BLD PROD TYP BPU: NORMAL
DISPENSE STATUS BLOOD BANK: NORMAL
TRANSFUSION STATUS: NORMAL
UNIT DIVISION: 0
UNIT NUMBER: NORMAL

## 2019-08-02 ENCOUNTER — TELEPHONE (OUTPATIENT)
Dept: ONCOLOGY | Age: 74
End: 2019-08-02

## 2019-08-02 ENCOUNTER — HOSPITAL ENCOUNTER (OUTPATIENT)
Dept: INFUSION THERAPY | Age: 74
Discharge: HOME | End: 2019-08-02
Payer: MEDICARE

## 2019-08-02 ENCOUNTER — OFFICE VISIT (OUTPATIENT)
Dept: ONCOLOGY | Age: 74
End: 2019-08-02
Payer: MEDICARE

## 2019-08-02 VITALS
BODY MASS INDEX: 33 KG/M2 | DIASTOLIC BLOOD PRESSURE: 71 MMHG | HEART RATE: 63 BPM | SYSTOLIC BLOOD PRESSURE: 119 MMHG | WEIGHT: 236.5 LBS | TEMPERATURE: 98.1 F

## 2019-08-02 DIAGNOSIS — C67.9 MALIGNANT NEOPLASM OF URINARY BLADDER, UNSPECIFIED SITE (HCC): ICD-10-CM

## 2019-08-02 DIAGNOSIS — C67.9 MALIGNANT NEOPLASM OF URINARY BLADDER, UNSPECIFIED SITE (HCC): Primary | ICD-10-CM

## 2019-08-02 DIAGNOSIS — T45.1X5A CHEMOTHERAPY-INDUCED NEUTROPENIA (HCC): ICD-10-CM

## 2019-08-02 DIAGNOSIS — D70.1 CHEMOTHERAPY-INDUCED NEUTROPENIA (HCC): ICD-10-CM

## 2019-08-02 LAB
ABSOLUTE EOS #: 0.06 K/UL (ref 0–0.4)
ABSOLUTE IMMATURE GRANULOCYTE: ABNORMAL K/UL (ref 0–0.3)
ABSOLUTE LYMPH #: 1.61 K/UL (ref 1–4.8)
ABSOLUTE MONO #: 0.21 K/UL (ref 0.1–0.8)
ALBUMIN SERPL-MCNC: 3.6 G/DL (ref 3.5–5.2)
ALBUMIN/GLOBULIN RATIO: 1 (ref 1–2.5)
ALP BLD-CCNC: 61 U/L (ref 40–129)
ALT SERPL-CCNC: 10 U/L (ref 5–41)
ANION GAP SERPL CALCULATED.3IONS-SCNC: 11 MMOL/L (ref 9–17)
AST SERPL-CCNC: 10 U/L
BASOPHILS # BLD: 0 % (ref 0–2)
BASOPHILS ABSOLUTE: 0 K/UL (ref 0–0.2)
BILIRUB SERPL-MCNC: 0.27 MG/DL (ref 0.3–1.2)
BUN BLDV-MCNC: 25 MG/DL (ref 8–23)
BUN/CREAT BLD: ABNORMAL (ref 9–20)
CALCIUM SERPL-MCNC: 9.1 MG/DL (ref 8.6–10.4)
CHLORIDE BLD-SCNC: 101 MMOL/L (ref 98–107)
CO2: 25 MMOL/L (ref 20–31)
CREAT SERPL-MCNC: 1.26 MG/DL (ref 0.7–1.2)
DIFFERENTIAL TYPE: ABNORMAL
EOSINOPHILS RELATIVE PERCENT: 3 % (ref 1–4)
GFR AFRICAN AMERICAN: >60 ML/MIN
GFR NON-AFRICAN AMERICAN: 56 ML/MIN
GFR SERPL CREATININE-BSD FRML MDRD: ABNORMAL ML/MIN/{1.73_M2}
GFR SERPL CREATININE-BSD FRML MDRD: ABNORMAL ML/MIN/{1.73_M2}
GLUCOSE BLD-MCNC: 149 MG/DL (ref 70–99)
HCT VFR BLD CALC: 26.1 % (ref 41–53)
HEMOGLOBIN: 8.9 G/DL (ref 13.5–17.5)
IMMATURE GRANULOCYTES: ABNORMAL %
LYMPHOCYTES # BLD: 85 % (ref 24–44)
MAGNESIUM: 1.8 MG/DL (ref 1.6–2.6)
MCH RBC QN AUTO: 33.9 PG (ref 26–34)
MCHC RBC AUTO-ENTMCNC: 34.2 G/DL (ref 31–37)
MCV RBC AUTO: 98.9 FL (ref 80–100)
MONOCYTES # BLD: 11 % (ref 1–7)
MORPHOLOGY: NORMAL
NRBC AUTOMATED: ABNORMAL PER 100 WBC
PDW BLD-RTO: 16.1 % (ref 12.5–15.4)
PLATELET # BLD: 58 K/UL (ref 140–450)
PLATELET ESTIMATE: ABNORMAL
PMV BLD AUTO: 9.7 FL (ref 6–12)
POTASSIUM SERPL-SCNC: 4.4 MMOL/L (ref 3.7–5.3)
RBC # BLD: 2.64 M/UL (ref 4.5–5.9)
RBC # BLD: ABNORMAL 10*6/UL
SEG NEUTROPHILS: 1 % (ref 36–66)
SEGMENTED NEUTROPHILS ABSOLUTE COUNT: 0.02 K/UL (ref 1.8–7.7)
SODIUM BLD-SCNC: 137 MMOL/L (ref 135–144)
TOTAL PROTEIN: 7.1 G/DL (ref 6.4–8.3)
WBC # BLD: 1.9 K/UL (ref 3.5–11)
WBC # BLD: ABNORMAL 10*3/UL

## 2019-08-02 PROCEDURE — 85025 COMPLETE CBC W/AUTO DIFF WBC: CPT

## 2019-08-02 PROCEDURE — 4004F PT TOBACCO SCREEN RCVD TLK: CPT | Performed by: INTERNAL MEDICINE

## 2019-08-02 PROCEDURE — G8427 DOCREV CUR MEDS BY ELIG CLIN: HCPCS | Performed by: INTERNAL MEDICINE

## 2019-08-02 PROCEDURE — 1123F ACP DISCUSS/DSCN MKR DOCD: CPT | Performed by: INTERNAL MEDICINE

## 2019-08-02 PROCEDURE — 80053 COMPREHEN METABOLIC PANEL: CPT

## 2019-08-02 PROCEDURE — 99214 OFFICE O/P EST MOD 30 MIN: CPT | Performed by: INTERNAL MEDICINE

## 2019-08-02 PROCEDURE — 83735 ASSAY OF MAGNESIUM: CPT

## 2019-08-02 PROCEDURE — G8417 CALC BMI ABV UP PARAM F/U: HCPCS | Performed by: INTERNAL MEDICINE

## 2019-08-02 PROCEDURE — 3017F COLORECTAL CA SCREEN DOC REV: CPT | Performed by: INTERNAL MEDICINE

## 2019-08-02 PROCEDURE — G8598 ASA/ANTIPLAT THER USED: HCPCS | Performed by: INTERNAL MEDICINE

## 2019-08-02 PROCEDURE — 4040F PNEUMOC VAC/ADMIN/RCVD: CPT | Performed by: INTERNAL MEDICINE

## 2019-08-02 RX ORDER — AMOXICILLIN 500 MG/1
500 CAPSULE ORAL 3 TIMES DAILY
Qty: 21 CAPSULE | Refills: 0 | OUTPATIENT
Start: 2019-08-02 | End: 2019-08-09

## 2019-08-02 RX ORDER — CIPROFLOXACIN 500 MG/1
500 TABLET, FILM COATED ORAL 2 TIMES DAILY
Qty: 14 TABLET | Refills: 0 | Status: SHIPPED | OUTPATIENT
Start: 2019-08-02 | End: 2019-08-09 | Stop reason: ALTCHOICE

## 2019-08-05 NOTE — PROGRESS NOTES
nephropathy with proteinuria ([facility] [address] Full dentures, GERD (gastroesophageal reflux disease), Gout, Heart attack ([address] Hx of CABG, Hyperlipidemia, Hypertension, Mitral regurgitation, Oropharyngeal dysphagia, Post laminectomy syndrome, Tobacco use, Type 2 diabetes with nephropathy ([address] and Wears glasses. PAST SURGICAL HISTORY: has a past surgical history that includes Coronary artery bypass graft (12/1984); back surgery (2000, 2003); Abdominal aortic aneurysm repair (12/02); Wrist ganglion excision (Left, 2008); tumor removal (Right, 2009); Colonoscopy; Arterial bypass surgry (1984); knee surgery (Left, 2008); Parotidectomy (Left, 2012); malignant skin lesion excision (Right, 04/27/2017); hiatal hernia repair (12/1980); ventral hernia repair (Left, 10/1997); Knee arthroscopy (Left, 2005); Knee arthroscopy (Right, 2006); External ear surgery (Right, 2017); Muscle Repair (2004); Cystocopy (02/05/2018); pr cystourethroscopy,fulgur . 5-2cm lesn (N/A, 2/5/2018); Cardiac surgery; hernia repair; Cystoscopy (N/A, 5/21/2019); and Cystoscopy (N/A, 6/27/2019). CURRENT MEDICATIONS:  has a current medication list which includes the following prescription(s): ciprofloxacin, ranolazine, metformin, blood glucose test strips, lidocaine-prilocaine, ondansetron, prochlorperazine, therapeutic multivitamin-minerals, isosorbide mononitrate, freestyle lancets, furosemide, morphine, dulera, captopril, lansoprazole, diphenhydramine-apap (sleep), atorvastatin, potassium chloride, atenolol, allopurinol, magnesium oxide, ondansetron, blood glucose monitor kit and supplies, lancet device, and amoxicillin, and the following Facility-Administered Medications: sodium chloride flush. ALLERGIES:  is allergic to baclofen; methadone; and iodides. FAMILY HISTORY: Negative for any hematological or oncological conditions. SOCIAL HISTORY:  reports that he has been smoking cigarettes. He started smoking about 152 years ago.  He has a 25.00

## 2019-08-09 ENCOUNTER — OFFICE VISIT (OUTPATIENT)
Dept: ONCOLOGY | Age: 74
End: 2019-08-09
Payer: MEDICARE

## 2019-08-09 ENCOUNTER — HOSPITAL ENCOUNTER (OUTPATIENT)
Dept: INFUSION THERAPY | Age: 74
Discharge: HOME | End: 2019-08-09
Payer: MEDICARE

## 2019-08-09 ENCOUNTER — TELEPHONE (OUTPATIENT)
Dept: ONCOLOGY | Age: 74
End: 2019-08-09

## 2019-08-09 VITALS
TEMPERATURE: 97.8 F | BODY MASS INDEX: 32.62 KG/M2 | WEIGHT: 233.8 LBS | SYSTOLIC BLOOD PRESSURE: 109 MMHG | DIASTOLIC BLOOD PRESSURE: 69 MMHG | HEART RATE: 65 BPM

## 2019-08-09 DIAGNOSIS — N18.30 STAGE 3 CHRONIC KIDNEY DISEASE (HCC): ICD-10-CM

## 2019-08-09 DIAGNOSIS — I50.22 CHRONIC SYSTOLIC CONGESTIVE HEART FAILURE (HCC): ICD-10-CM

## 2019-08-09 DIAGNOSIS — C67.9 MALIGNANT NEOPLASM OF URINARY BLADDER, UNSPECIFIED SITE (HCC): ICD-10-CM

## 2019-08-09 DIAGNOSIS — D70.1 CHEMOTHERAPY-INDUCED NEUTROPENIA (HCC): ICD-10-CM

## 2019-08-09 DIAGNOSIS — R53.1 ASTHENIA: ICD-10-CM

## 2019-08-09 DIAGNOSIS — T45.1X5A CHEMOTHERAPY-INDUCED NEUTROPENIA (HCC): ICD-10-CM

## 2019-08-09 DIAGNOSIS — C67.9 MALIGNANT NEOPLASM OF URINARY BLADDER, UNSPECIFIED SITE (HCC): Primary | ICD-10-CM

## 2019-08-09 LAB
ABSOLUTE EOS #: 0 K/UL (ref 0–0.4)
ABSOLUTE IMMATURE GRANULOCYTE: ABNORMAL K/UL (ref 0–0.3)
ABSOLUTE LYMPH #: 3.07 K/UL (ref 1–4.8)
ABSOLUTE MONO #: 1.22 K/UL (ref 0.1–0.8)
ALBUMIN SERPL-MCNC: 3.8 G/DL (ref 3.5–5.2)
ALBUMIN/GLOBULIN RATIO: 1 (ref 1–2.5)
ALP BLD-CCNC: 67 U/L (ref 40–129)
ALT SERPL-CCNC: 13 U/L (ref 5–41)
ANION GAP SERPL CALCULATED.3IONS-SCNC: 13 MMOL/L (ref 9–17)
AST SERPL-CCNC: 14 U/L
BASOPHILS # BLD: 0 % (ref 0–2)
BASOPHILS ABSOLUTE: 0 K/UL (ref 0–0.2)
BILIRUB SERPL-MCNC: 0.2 MG/DL (ref 0.3–1.2)
BUN BLDV-MCNC: 27 MG/DL (ref 8–23)
BUN/CREAT BLD: ABNORMAL (ref 9–20)
CALCIUM SERPL-MCNC: 9.4 MG/DL (ref 8.6–10.4)
CHLORIDE BLD-SCNC: 102 MMOL/L (ref 98–107)
CO2: 23 MMOL/L (ref 20–31)
CREAT SERPL-MCNC: 1.47 MG/DL (ref 0.7–1.2)
DIFFERENTIAL TYPE: ABNORMAL
EOSINOPHILS RELATIVE PERCENT: 0 % (ref 1–4)
GFR AFRICAN AMERICAN: 57 ML/MIN
GFR NON-AFRICAN AMERICAN: 47 ML/MIN
GFR SERPL CREATININE-BSD FRML MDRD: ABNORMAL ML/MIN/{1.73_M2}
GFR SERPL CREATININE-BSD FRML MDRD: ABNORMAL ML/MIN/{1.73_M2}
GLUCOSE BLD-MCNC: 126 MG/DL (ref 70–99)
HCT VFR BLD CALC: 27.9 % (ref 41–53)
HEMOGLOBIN: 9.6 G/DL (ref 13.5–17.5)
IMMATURE GRANULOCYTES: ABNORMAL %
LYMPHOCYTES # BLD: 48 % (ref 24–44)
MAGNESIUM: 1.9 MG/DL (ref 1.6–2.6)
MCH RBC QN AUTO: 34.2 PG (ref 26–34)
MCHC RBC AUTO-ENTMCNC: 34.3 G/DL (ref 31–37)
MCV RBC AUTO: 99.8 FL (ref 80–100)
MONOCYTES # BLD: 19 % (ref 1–7)
MORPHOLOGY: NORMAL
NRBC AUTOMATED: ABNORMAL PER 100 WBC
NUCLEATED RED BLOOD CELLS: 2 PER 100 WBC
PDW BLD-RTO: 16.3 % (ref 12.5–15.4)
PLATELET # BLD: 290 K/UL (ref 140–450)
PLATELET ESTIMATE: ABNORMAL
PMV BLD AUTO: 7.9 FL (ref 6–12)
POTASSIUM SERPL-SCNC: 4.7 MMOL/L (ref 3.7–5.3)
RBC # BLD: 2.79 M/UL (ref 4.5–5.9)
RBC # BLD: ABNORMAL 10*6/UL
SEG NEUTROPHILS: 33 % (ref 36–66)
SEGMENTED NEUTROPHILS ABSOLUTE COUNT: 2.11 K/UL (ref 1.8–7.7)
SODIUM BLD-SCNC: 138 MMOL/L (ref 135–144)
TOTAL PROTEIN: 7.5 G/DL (ref 6.4–8.3)
WBC # BLD: 6.4 K/UL (ref 3.5–11)
WBC # BLD: ABNORMAL 10*3/UL

## 2019-08-09 PROCEDURE — 80053 COMPREHEN METABOLIC PANEL: CPT

## 2019-08-09 PROCEDURE — G8427 DOCREV CUR MEDS BY ELIG CLIN: HCPCS | Performed by: INTERNAL MEDICINE

## 2019-08-09 PROCEDURE — G8417 CALC BMI ABV UP PARAM F/U: HCPCS | Performed by: INTERNAL MEDICINE

## 2019-08-09 PROCEDURE — 99215 OFFICE O/P EST HI 40 MIN: CPT | Performed by: INTERNAL MEDICINE

## 2019-08-09 PROCEDURE — 83735 ASSAY OF MAGNESIUM: CPT

## 2019-08-09 PROCEDURE — 4004F PT TOBACCO SCREEN RCVD TLK: CPT | Performed by: INTERNAL MEDICINE

## 2019-08-09 PROCEDURE — G8598 ASA/ANTIPLAT THER USED: HCPCS | Performed by: INTERNAL MEDICINE

## 2019-08-09 PROCEDURE — 4040F PNEUMOC VAC/ADMIN/RCVD: CPT | Performed by: INTERNAL MEDICINE

## 2019-08-09 PROCEDURE — 3017F COLORECTAL CA SCREEN DOC REV: CPT | Performed by: INTERNAL MEDICINE

## 2019-08-09 PROCEDURE — 85025 COMPLETE CBC W/AUTO DIFF WBC: CPT

## 2019-08-09 PROCEDURE — 1123F ACP DISCUSS/DSCN MKR DOCD: CPT | Performed by: INTERNAL MEDICINE

## 2019-08-09 NOTE — PROGRESS NOTES
ears.  Denies neuropathy. During this visit patient's allergy, social, medical, surgical history and medications were reviewed and updated. PAST MEDICAL HISTORY: has a past medical history of AAA (abdominal aortic aneurysm) ([address] CAD (coronary artery disease), Cancer ([address] Carotid artery stenosis, Central sleep apnea, CHF (congestive heart failure) ([address] Colon polyps, DDD (degenerative disc disease), lumbar, Diabetic nephropathy with proteinuria ([address] Full dentures, GERD (gastroesophageal reflux disease), Gout, Heart attack ([address] Hx of CABG, Hyperlipidemia, Hypertension, Mitral regurgitation, Oropharyngeal dysphagia, Post laminectomy syndrome, Tobacco use, Type 2 diabetes with nephropathy ([address] and Wears glasses. PAST SURGICAL HISTORY: has a past surgical history that includes Coronary artery bypass graft (12/1984); back surgery (2000, 2003); Abdominal aortic aneurysm repair (12/02); Wrist ganglion excision (Left, 2008); tumor removal (Right, 2009); Colonoscopy; Arterial bypass surgry (1984); knee surgery (Left, 2008); Parotidectomy (Left, 2012); malignant skin lesion excision (Right, 04/27/2017); hiatal hernia repair (12/1980); ventral hernia repair (Left, 10/1997); Knee arthroscopy (Left, 2005); Knee arthroscopy (Right, 2006); External ear surgery (Right, 2017); Muscle Repair (2004); Cystocopy (02/05/2018); pr cystourethroscopy,fulgur . 5-2cm lesn (N/A, 2/5/2018); Cardiac surgery; hernia repair; Cystoscopy (N/A, 5/21/2019); and Cystoscopy (N/A, 6/27/2019).      CURRENT MEDICATIONS:  has a current medication list which includes the following prescription(s): amoxicillin, ranolazine, metformin, blood glucose test strips, lidocaine-prilocaine, ondansetron, prochlorperazine, therapeutic multivitamin-minerals, isosorbide mononitrate, freestyle lancets, furosemide, morphine, dulera, captopril, lansoprazole, diphenhydramine-apap (sleep), atorvastatin, potassium chloride, atenolol, allopurinol, magnesium oxide, ondansetron, blood glucose monitor kit and supplies, and lancet device, and the following Facility-Administered Medications: sodium chloride flush. ALLERGIES:  is allergic to baclofen; methadone; and iodides. FAMILY HISTORY: Negative for any hematological or oncological conditions. SOCIAL HISTORY:  reports that he has been smoking cigarettes. He started smoking about 152 years ago. He has a 25.00 pack-year smoking history. He has never used smokeless tobacco. He reports that he does not drink alcohol or use drugs. REVIEW OF SYSTEMS:     General: no fever or night sweats, positive fatigue  ENT: No double or blurred vision, no hearing problem, no dysphagia or sore throat   Respiratory: No chest pain, no shortness of breath, no cough or hemoptysis. Cardiovascular: Denies chest pain, PND or orthopnea. No L E swelling or palpitations. Gastrointestinal:    No nausea or vomiting, abdominal pain, diarrhea or constipation. Genitourinary: Denies dysuria, hematuria, frequency, urgency or incontinence. Neurological: Denies headaches, decreased LOC, no sensory or motor focal deficits. Musculoskeletal:  No arthralgia no back pain or joint swelling. Skin: There are no rashes or bleeding. Psych: Denies hallucinations or intentions to harm self      PHYSICAL EXAM: Shows a well appearing 75y.o.-year-old male who is not in pain or distress. Vital Signs: Blood pressure 109/69, pulse 65, temperature 97.8 °F (36.6 °C), temperature source Oral, weight 233 lb 12.8 oz (106.1 kg). HEENT: Normocephalic and atraumatic. Pupils are equal, round, reactive to light and accommodation. Extraocular muscles are intact. Neck: Showed no JVD, no carotid bruit . Lungs: Clear to auscultation bilaterally. Heart: Regular without any murmur. Abdomen: Soft, nontender. No hepatosplenomegaly. Extremities: Lower extremities show no edema, clubbing, or cyanosis. Breasts: Examination not done today.  Neuro exam: intact cranial nerves HIGH GRADE UROTHELIAL DYSPLASIA (CARCINOMA IN SITU)     EXTENSIVE ACUTE INFLAMMATION, EDEMA AND NECROSIS     MUSCULARIS PROPRIA (DETRUSOR MUSCLE) PRESENT     IMPRESSION:   Bladder cancer T3 N0 M0  CHF, ejection fraction 40-45%  CKD  Tobacco addiction  Asthenia    PLAN:     I had a detailed discussion with the patient personally went over results with lab work-up imaging studies and other relevant clinical data. Toxicity check performed. Patient blood counts have recovered and are adequate for treatment however patient does not want to proceed with any further treatment. I had a detailed discussion with the patient and his wife, we reviewed goals and expectations. Patient expresses concern that he cannot go through any more further chemotherapy due to the associated side effects. I reviewed clinical data regarding neoadjuvant chemotherapy which is considered standard of care. I also reviewed data regarding improvement in overall survival and decrease in disease recurrence. Patient is very clear in his decision regarding not proceeding with any further neoadjuvant therapy. I also discussed patient's case over the phone with his urologist and informed him about patient's decision. I do recommend post cystectomy follow-up at her office for discussion regarding further treatment plan including surveillance as well as consideration for trial for adjuvant immune therapy if patient is eligible. NCCN guidelines were reviewed and discussed with the patient. The diagnosis and care plan were discussed with the patient in detail. I discussed the natural history of the disease, prognosis, risks and goals of therapy and answered all the patients questions to the best of my ability. Patient expressed understanding and was in agreement. Kalina Hobson        I spent more than 40 minutes examining, evaluating, reviewing data, counseling the patient and coordinating care.   Greater than 50% of time was spent

## 2019-08-12 ENCOUNTER — OFFICE VISIT (OUTPATIENT)
Dept: PRIMARY CARE CLINIC | Age: 74
End: 2019-08-12
Payer: MEDICARE

## 2019-08-12 VITALS
BODY MASS INDEX: 32.98 KG/M2 | WEIGHT: 235.6 LBS | RESPIRATION RATE: 16 BRPM | SYSTOLIC BLOOD PRESSURE: 96 MMHG | OXYGEN SATURATION: 94 % | HEIGHT: 71 IN | DIASTOLIC BLOOD PRESSURE: 62 MMHG

## 2019-08-12 DIAGNOSIS — J02.9 SORE THROAT: ICD-10-CM

## 2019-08-12 DIAGNOSIS — E11.21 TYPE 2 DIABETES WITH NEPHROPATHY (HCC): Primary | ICD-10-CM

## 2019-08-12 PROCEDURE — 3045F PR MOST RECENT HEMOGLOBIN A1C LEVEL 7.0-9.0%: CPT | Performed by: NURSE PRACTITIONER

## 2019-08-12 PROCEDURE — 4040F PNEUMOC VAC/ADMIN/RCVD: CPT | Performed by: NURSE PRACTITIONER

## 2019-08-12 PROCEDURE — 1123F ACP DISCUSS/DSCN MKR DOCD: CPT | Performed by: NURSE PRACTITIONER

## 2019-08-12 PROCEDURE — G8427 DOCREV CUR MEDS BY ELIG CLIN: HCPCS | Performed by: NURSE PRACTITIONER

## 2019-08-12 PROCEDURE — 2022F DILAT RTA XM EVC RTNOPTHY: CPT | Performed by: NURSE PRACTITIONER

## 2019-08-12 PROCEDURE — 99214 OFFICE O/P EST MOD 30 MIN: CPT | Performed by: NURSE PRACTITIONER

## 2019-08-12 PROCEDURE — G8598 ASA/ANTIPLAT THER USED: HCPCS | Performed by: NURSE PRACTITIONER

## 2019-08-12 PROCEDURE — 4004F PT TOBACCO SCREEN RCVD TLK: CPT | Performed by: NURSE PRACTITIONER

## 2019-08-12 PROCEDURE — 3017F COLORECTAL CA SCREEN DOC REV: CPT | Performed by: NURSE PRACTITIONER

## 2019-08-12 PROCEDURE — G8417 CALC BMI ABV UP PARAM F/U: HCPCS | Performed by: NURSE PRACTITIONER

## 2019-08-12 ASSESSMENT — PATIENT HEALTH QUESTIONNAIRE - PHQ9
SUM OF ALL RESPONSES TO PHQ QUESTIONS 1-9: 2
2. FEELING DOWN, DEPRESSED OR HOPELESS: 1
SUM OF ALL RESPONSES TO PHQ QUESTIONS 1-9: 2
SUM OF ALL RESPONSES TO PHQ9 QUESTIONS 1 & 2: 2
1. LITTLE INTEREST OR PLEASURE IN DOING THINGS: 1

## 2019-08-12 ASSESSMENT — ENCOUNTER SYMPTOMS
SHORTNESS OF BREATH: 0
ABDOMINAL PAIN: 0
BACK PAIN: 1
SORE THROAT: 1
COUGH: 0

## 2019-08-12 NOTE — PROGRESS NOTES
MOUTHWASH)     Sig: Swish and swallow 5 mLs 4 times daily as needed for Irritation or Pain     Dispense:  240 mL     Refill:  2     80ml lidocaine 80ml nystatin 80ml benadryl       Patient given educational materials - see patient instructions. Discussed use, benefit, and side effects of prescribed medications. All patientquestions answered. Pt voiced understanding. Reviewed health maintenance. Instructedto continue current medications, diet and exercise. Patient agreed with treatmentplan. Follow up as directed.      Electronicallysigned by JOSE Willis CNP on 8/12/2019 at 8:40 AM

## 2019-08-22 ENCOUNTER — OFFICE VISIT (OUTPATIENT)
Dept: UROLOGY | Age: 74
End: 2019-08-22
Payer: MEDICARE

## 2019-08-22 VITALS
DIASTOLIC BLOOD PRESSURE: 58 MMHG | WEIGHT: 226.2 LBS | TEMPERATURE: 98.2 F | SYSTOLIC BLOOD PRESSURE: 94 MMHG | HEART RATE: 69 BPM | BODY MASS INDEX: 31.67 KG/M2 | HEIGHT: 71 IN

## 2019-08-22 DIAGNOSIS — C67.8 MALIGNANT NEOPLASM OF OVERLAPPING SITES OF BLADDER (HCC): Primary | ICD-10-CM

## 2019-08-22 PROCEDURE — G8598 ASA/ANTIPLAT THER USED: HCPCS | Performed by: UROLOGY

## 2019-08-22 PROCEDURE — G8427 DOCREV CUR MEDS BY ELIG CLIN: HCPCS | Performed by: UROLOGY

## 2019-08-22 PROCEDURE — 4040F PNEUMOC VAC/ADMIN/RCVD: CPT | Performed by: UROLOGY

## 2019-08-22 PROCEDURE — G8417 CALC BMI ABV UP PARAM F/U: HCPCS | Performed by: UROLOGY

## 2019-08-22 PROCEDURE — 4004F PT TOBACCO SCREEN RCVD TLK: CPT | Performed by: UROLOGY

## 2019-08-22 PROCEDURE — 3017F COLORECTAL CA SCREEN DOC REV: CPT | Performed by: UROLOGY

## 2019-08-22 PROCEDURE — 1123F ACP DISCUSS/DSCN MKR DOCD: CPT | Performed by: UROLOGY

## 2019-08-22 PROCEDURE — 99214 OFFICE O/P EST MOD 30 MIN: CPT | Performed by: UROLOGY

## 2019-08-22 ASSESSMENT — ENCOUNTER SYMPTOMS
SHORTNESS OF BREATH: 0
COUGH: 0
NAUSEA: 0
ABDOMINAL PAIN: 0
VOMITING: 0
EYE REDNESS: 0
WHEEZING: 0
COLOR CHANGE: 0
BACK PAIN: 0
EYE PAIN: 0

## 2019-08-22 NOTE — PROGRESS NOTES
of the defined types were placed in this encounter. Orders Placed:  Orders Placed This Encounter   Procedures    CBC With Auto Differential     Standing Status:   Future     Standing Expiration Date:   8/22/2020    Comprehensive Metabolic Panel     Standing Status:   Future     Standing Expiration Date:   8/22/2020           Hiwot Higginbotham MD    Agree with the ROS entered by the MA.

## 2019-08-23 ENCOUNTER — TELEPHONE (OUTPATIENT)
Dept: ONCOLOGY | Age: 74
End: 2019-08-23

## 2019-08-23 ENCOUNTER — TELEPHONE (OUTPATIENT)
Dept: UROLOGY | Age: 74
End: 2019-08-23

## 2019-08-26 RX ORDER — ISOSORBIDE MONONITRATE 60 MG/1
TABLET, EXTENDED RELEASE ORAL
Qty: 90 TABLET | Refills: 0 | Status: SHIPPED | OUTPATIENT
Start: 2019-08-26 | End: 2019-11-22 | Stop reason: SDUPTHER

## 2019-08-26 NOTE — TELEPHONE ENCOUNTER
Last OV 08/12/2019    Health Maintenance   Topic Date Due    DTaP/Tdap/Td vaccine (1 - Tdap) 11/25/1963    Annual Wellness Visit (AWV)  11/25/2007    Shingles Vaccine (2 of 3) 06/25/2015    Diabetic retinal exam  04/12/2017    Diabetic foot exam  02/01/2018    Lipid screen  08/01/2019    Flu vaccine (1) 09/01/2019    A1C test (Diabetic or Prediabetic)  07/19/2020    Potassium monitoring  08/09/2020    Creatinine monitoring  08/09/2020    Colon cancer screen colonoscopy  05/13/2021    Pneumococcal 65+ years Vaccine  Completed             (applicable per patient's age: Cancer Screenings, Depression Screening, Fall Risk Screening, Immunizations)    Hemoglobin A1C (%)   Date Value   07/19/2019 7.0 (H)   02/25/2019 6.3   08/22/2018 6.5     LDL Cholesterol (mg/dL)   Date Value   08/01/2018 61     LDL Calculated (mg/dL)   Date Value   11/18/2015 74     AST (U/L)   Date Value   08/09/2019 14     ALT (U/L)   Date Value   08/09/2019 13     BUN (mg/dL)   Date Value   08/09/2019 27 (H)      (goal A1C is < 7)   (goal LDL is <100) need 30-50% reduction from baseline     BP Readings from Last 3 Encounters:   08/22/19 (!) 94/58   08/12/19 96/62   08/09/19 109/69    (goal /80)      All Future Testing planned in [facility]:  Lab Frequency Next Occurrence   Lipid Panel Once 02/25/2020   Comprehensive Metabolic Panel Once 24/84/8254   CBC With Auto Differential Once 08/22/2019   Comprehensive Metabolic Panel Once 58/06/5308   CBC With Auto Differential     Comprehensive Metabolic Panel     Magnesium         Next Visit Date:  Future Appointments   Date Time Provider Diandra Horton   9/18/2019 10:00 AM [facility] PAT RM 3 [facility] PAT [facility]   9/20/2019  2:30 PM [facility] MED ONC CHAIR 06 [facility] [place]   12/9/2019  7:40 AM JOSE Pink - CNP [facility]            Patient Active Problem List:     AAA (abdominal aortic aneurysm) (HCC)     Hypertension     Hyperlipidemia     OA (osteoarthritis) of

## 2019-08-29 ENCOUNTER — TELEPHONE (OUTPATIENT)
Dept: PRIMARY CARE CLINIC | Age: 74
End: 2019-08-29

## 2019-08-29 NOTE — TELEPHONE ENCOUNTER
patient having 8 hour surgery by DR Mark Damian  (urology) on 10/2/19 , PAT will be done on 9/18/19 at [facility] . You just seen him on 8/12/19,  Does he need appt. For medical clearance? If so I do not have open appt. With you until 9/30/19. Please let him know ?  Thanks

## 2019-09-05 ENCOUNTER — HOSPITAL ENCOUNTER (OUTPATIENT)
Age: 74
Discharge: HOME | End: 2019-09-05
Payer: MEDICARE

## 2019-09-05 DIAGNOSIS — C67.8 MALIGNANT NEOPLASM OF OVERLAPPING SITES OF BLADDER (HCC): ICD-10-CM

## 2019-09-05 LAB
ALBUMIN SERPL-MCNC: 3.8 G/DL (ref 3.5–5.2)
ALBUMIN/GLOBULIN RATIO: 1.1 (ref 1–2.5)
ALP BLD-CCNC: 59 U/L (ref 40–129)
ALT SERPL-CCNC: 9 U/L (ref 5–41)
ANION GAP SERPL CALCULATED.3IONS-SCNC: 17 MMOL/L (ref 9–17)
AST SERPL-CCNC: 12 U/L
BILIRUB SERPL-MCNC: 0.32 MG/DL (ref 0.3–1.2)
BUN BLDV-MCNC: 28 MG/DL (ref 8–23)
BUN/CREAT BLD: ABNORMAL (ref 9–20)
CALCIUM SERPL-MCNC: 9.4 MG/DL (ref 8.6–10.4)
CHLORIDE BLD-SCNC: 100 MMOL/L (ref 98–107)
CO2: 24 MMOL/L (ref 20–31)
CREAT SERPL-MCNC: 1.58 MG/DL (ref 0.7–1.2)
GFR AFRICAN AMERICAN: 52 ML/MIN
GFR NON-AFRICAN AMERICAN: 43 ML/MIN
GFR SERPL CREATININE-BSD FRML MDRD: ABNORMAL ML/MIN/{1.73_M2}
GFR SERPL CREATININE-BSD FRML MDRD: ABNORMAL ML/MIN/{1.73_M2}
GLUCOSE BLD-MCNC: 128 MG/DL (ref 70–99)
POTASSIUM SERPL-SCNC: 4.9 MMOL/L (ref 3.7–5.3)
SODIUM BLD-SCNC: 141 MMOL/L (ref 135–144)
TOTAL PROTEIN: 7.4 G/DL (ref 6.4–8.3)

## 2019-09-05 PROCEDURE — 36415 COLL VENOUS BLD VENIPUNCTURE: CPT

## 2019-09-05 PROCEDURE — 80053 COMPREHEN METABOLIC PANEL: CPT

## 2019-09-06 ENCOUNTER — TELEPHONE (OUTPATIENT)
Dept: UROLOGY | Age: 74
End: 2019-09-06

## 2019-09-06 RX ORDER — METRONIDAZOLE 500 MG/1
TABLET ORAL
Qty: 4 TABLET | Refills: 0 | Status: ON HOLD | OUTPATIENT
Start: 2019-09-06 | End: 2019-10-04 | Stop reason: HOSPADM

## 2019-09-06 RX ORDER — NEOMYCIN SULFATE 500 MG/1
TABLET ORAL
Qty: 1 TABLET | Refills: 0 | Status: ON HOLD | OUTPATIENT
Start: 2019-09-06 | End: 2019-10-04 | Stop reason: HOSPADM

## 2019-09-18 ENCOUNTER — HOSPITAL ENCOUNTER (OUTPATIENT)
Dept: GENERAL RADIOLOGY | Age: 74
Discharge: HOME | End: 2019-09-20
Payer: MEDICARE

## 2019-09-18 ENCOUNTER — HOSPITAL ENCOUNTER (OUTPATIENT)
Dept: PREADMISSION TESTING | Age: 74
Discharge: HOME | End: 2019-09-22
Payer: MEDICARE

## 2019-09-18 VITALS
RESPIRATION RATE: 20 BRPM | BODY MASS INDEX: 31.78 KG/M2 | HEIGHT: 71 IN | OXYGEN SATURATION: 97 % | DIASTOLIC BLOOD PRESSURE: 70 MMHG | TEMPERATURE: 97.4 F | SYSTOLIC BLOOD PRESSURE: 119 MMHG | WEIGHT: 227 LBS | HEART RATE: 64 BPM

## 2019-09-18 LAB
ANION GAP SERPL CALCULATED.3IONS-SCNC: 17 MMOL/L (ref 9–17)
BUN BLDV-MCNC: 27 MG/DL (ref 8–23)
CHLORIDE BLD-SCNC: 98 MMOL/L (ref 98–107)
CO2: 25 MMOL/L (ref 20–31)
CREAT SERPL-MCNC: 1.44 MG/DL (ref 0.7–1.2)
GFR AFRICAN AMERICAN: 58 ML/MIN
GFR NON-AFRICAN AMERICAN: 48 ML/MIN
GFR SERPL CREATININE-BSD FRML MDRD: ABNORMAL ML/MIN/{1.73_M2}
GFR SERPL CREATININE-BSD FRML MDRD: ABNORMAL ML/MIN/{1.73_M2}
GLUCOSE BLD-MCNC: 102 MG/DL (ref 70–99)
HCT VFR BLD CALC: 32.6 % (ref 40.7–50.3)
HEMOGLOBIN: 10.5 G/DL (ref 13–17)
POTASSIUM SERPL-SCNC: 5.4 MMOL/L (ref 3.7–5.3)
SODIUM BLD-SCNC: 140 MMOL/L (ref 135–144)

## 2019-09-18 PROCEDURE — 99212 OFFICE O/P EST SF 10 MIN: CPT

## 2019-09-18 PROCEDURE — 82947 ASSAY GLUCOSE BLOOD QUANT: CPT

## 2019-09-18 PROCEDURE — 84520 ASSAY OF UREA NITROGEN: CPT

## 2019-09-18 PROCEDURE — 85014 HEMATOCRIT: CPT

## 2019-09-18 PROCEDURE — 80051 ELECTROLYTE PANEL: CPT

## 2019-09-18 PROCEDURE — 87086 URINE CULTURE/COLONY COUNT: CPT

## 2019-09-18 PROCEDURE — 85018 HEMOGLOBIN: CPT

## 2019-09-18 PROCEDURE — 71046 X-RAY EXAM CHEST 2 VIEWS: CPT

## 2019-09-18 PROCEDURE — 82565 ASSAY OF CREATININE: CPT

## 2019-09-18 PROCEDURE — 36415 COLL VENOUS BLD VENIPUNCTURE: CPT

## 2019-09-18 PROCEDURE — 93005 ELECTROCARDIOGRAM TRACING: CPT

## 2019-09-18 RX ORDER — SODIUM CHLORIDE, SODIUM LACTATE, POTASSIUM CHLORIDE, CALCIUM CHLORIDE 600; 310; 30; 20 MG/100ML; MG/100ML; MG/100ML; MG/100ML
1000 INJECTION, SOLUTION INTRAVENOUS CONTINUOUS
Status: CANCELLED | OUTPATIENT
Start: 2019-09-18

## 2019-09-18 NOTE — H&P
History and Physical    Pt Name: Surinder Holguin  MRN: [id number]  YOB: 1945  Date of evaluation: 9/18/2019  Primary Care Physician: JOSE Coronado - CNP  Patient evaluated at the request of  Dr. Rhett Orosco     Reason for evaluation:  Bladder cancer   SUBJECTIVE:   History of Chief Complaint:   According to urology note in Aug/2019     Milli Krishnan is a 75 y.o. male  Who was dx with bladder cancer in 2018 approx   Hx of smoking for the last 50 yrs just quit approx 2 weeks ago  ,The patient is a 75 y.o. male who presents todayfor evaluation of the following problems:        Chief Complaint   Patient presents with    Bladder Cancer       between chemo treatments; pt states that oncology opted to stop treatments; discuss cystectomy          HPI  Had 2 rounds of chemo, could't tolerate. So here for cystectomy discussion. Had AAA and abd wall hernia with mesh. Past Medical History      has a past medical history of AAA (abdominal aortic aneurysm) ([address] CAD (coronary artery disease), Cancer ([address] Carotid artery stenosis, Central sleep apnea, CHF (congestive heart failure) ([address] Colon polyps, DDD (degenerative disc disease), lumbar, Diabetic nephropathy with proteinuria ([address] Full dentures, GERD (gastroesophageal reflux disease), Gout, Heart attack ([address] Hx of CABG, Hyperlipidemia, Hypertension, Mitral regurgitation, Oropharyngeal dysphagia, Post laminectomy syndrome, Tobacco use, Type 2 diabetes with nephropathy ([address] and Wears glasses. Past Surgical History   has a past surgical history that includes Coronary artery bypass graft (12/1984); back surgery (2000, 2003); Abdominal aortic aneurysm repair (12/02); Wrist ganglion excision (Left, 2008); tumor removal (Right, 2009); Colonoscopy; Arterial bypass surgry (1984); knee surgery (Left, 2008);  Parotidectomy (Left, 2012); malignant skin lesion excision (Right, 04/27/2017); hiatal hernia repair (12/1980); ventral hernia repair Inhaler 5    captopril (CAPOTEN) 25 MG tablet TAKE ONE TABLET BY MOUTH THREE TIMES A  tablet 3    lansoprazole (PREVACID) 30 MG delayed release capsule TAKE ONE CAPSULE BY MOUTH DAILY 90 capsule 3    atorvastatin (LIPITOR) 40 MG tablet TAKE ONE TABLET BY MOUTH DAILY 90 tablet 3    potassium chloride (KLOR-CON M) 20 MEQ extended release tablet TAKE ONE TABLET BY MOUTH DAILY 90 tablet 3    atenolol (TENORMIN) 25 MG tablet TAKE ONE TABLET BY MOUTH DAILY 90 tablet 3    allopurinol (ZYLOPRIM) 100 MG tablet TAKE ONE TABLET BY MOUTH DAILY 90 tablet 3    Magnesium Oxide 500 MG TABS Take 500 mg by mouth daily 90 tablet 2    ondansetron (ZOFRAN) 8 MG tablet Take 1 tablet by mouth every 12 hours as needed for Nausea or Vomiting 60 tablet 2    Blood Glucose Monitoring Suppl ROMY Glucometer- Free Style Lite 1 Device 0    Lancet Device MISC 1 Device by Lang Ma. (Med.Supl.;Non-Drugs) route 3 times daily To check blood sugars. Free Style Lite 1 Device 0     Continuous Infusions:  PRN Meds:. Allergies  is allergic to baclofen; methadone; and iodides. Family History    family history includes Breast Cancer in his mother; Heart Disease in his brother and father; Heart Surgery in his brother; High Blood Pressure in his father; Stroke in his father.     Family Status   Relation Name Status    Mother      Father      Brother  Alive    Brother      MGM      PGM      MGF      PGF           Social History  Social History     Socioeconomic History    Marital status:      Spouse name: Trenton Tate Number of children: 0    Years of education: Not on file    Highest education level: Not on file   Occupational History    Not on file   Social Needs    Financial resource strain: Not on file    Food insecurity:     Worry: Not on file     Inability: Not on file    Transportation needs:     Medical: Not on file     Non-medical: Not on file   Tobacco Use   

## 2019-09-19 LAB
CULTURE: NO GROWTH
EKG ATRIAL RATE: 58 BPM
EKG P AXIS: 53 DEGREES
EKG P-R INTERVAL: 158 MS
EKG Q-T INTERVAL: 426 MS
EKG QRS DURATION: 96 MS
EKG QTC CALCULATION (BAZETT): 418 MS
EKG R AXIS: 0 DEGREES
EKG T AXIS: 38 DEGREES
EKG VENTRICULAR RATE: 58 BPM
Lab: NORMAL
SPECIMEN DESCRIPTION: NORMAL

## 2019-09-20 ENCOUNTER — HOSPITAL ENCOUNTER (OUTPATIENT)
Dept: INFUSION THERAPY | Age: 74
Discharge: HOME | End: 2019-09-20
Payer: MEDICARE

## 2019-09-20 DIAGNOSIS — C67.9 MALIGNANT NEOPLASM OF URINARY BLADDER, UNSPECIFIED SITE (HCC): Primary | ICD-10-CM

## 2019-09-20 PROCEDURE — 96523 IRRIG DRUG DELIVERY DEVICE: CPT

## 2019-09-20 PROCEDURE — 2580000003 HC RX 258: Performed by: INTERNAL MEDICINE

## 2019-09-20 PROCEDURE — 6360000002 HC RX W HCPCS: Performed by: INTERNAL MEDICINE

## 2019-09-20 RX ORDER — HEPARIN SODIUM (PORCINE) LOCK FLUSH IV SOLN 100 UNIT/ML 100 UNIT/ML
500 SOLUTION INTRAVENOUS PRN
Status: CANCELLED | OUTPATIENT
Start: 2019-09-20

## 2019-09-20 RX ORDER — SODIUM CHLORIDE 0.9 % (FLUSH) 0.9 %
20 SYRINGE (ML) INJECTION PRN
Status: CANCELLED | OUTPATIENT
Start: 2019-09-20

## 2019-09-20 RX ORDER — HEPARIN SODIUM (PORCINE) LOCK FLUSH IV SOLN 100 UNIT/ML 100 UNIT/ML
500 SOLUTION INTRAVENOUS PRN
Status: DISCONTINUED | OUTPATIENT
Start: 2019-09-20 | End: 2019-09-21 | Stop reason: HOSPADM

## 2019-09-20 RX ORDER — SODIUM CHLORIDE 0.9 % (FLUSH) 0.9 %
10 SYRINGE (ML) INJECTION PRN
Status: CANCELLED | OUTPATIENT
Start: 2019-09-20

## 2019-09-20 RX ORDER — SODIUM CHLORIDE 0.9 % (FLUSH) 0.9 %
10 SYRINGE (ML) INJECTION PRN
Status: DISCONTINUED | OUTPATIENT
Start: 2019-09-20 | End: 2019-09-21 | Stop reason: HOSPADM

## 2019-09-20 RX ADMIN — Medication 500 UNITS: at 14:50

## 2019-09-20 RX ADMIN — Medication 10 ML: at 14:50

## 2019-09-28 DIAGNOSIS — J42 CHRONIC BRONCHITIS, UNSPECIFIED CHRONIC BRONCHITIS TYPE (HCC): ICD-10-CM

## 2019-10-01 ENCOUNTER — ANESTHESIA EVENT (OUTPATIENT)
Dept: OPERATING ROOM | Age: 74
DRG: 654 | End: 2019-10-01
Payer: MEDICARE

## 2019-10-02 ENCOUNTER — HOSPITAL ENCOUNTER (INPATIENT)
Age: 74
LOS: 5 days | Discharge: HOME | DRG: 654 | End: 2019-10-07
Attending: UROLOGY | Admitting: UROLOGY
Payer: MEDICARE

## 2019-10-02 ENCOUNTER — ANESTHESIA (OUTPATIENT)
Dept: OPERATING ROOM | Age: 74
DRG: 654 | End: 2019-10-02
Payer: MEDICARE

## 2019-10-02 VITALS — TEMPERATURE: 95 F | SYSTOLIC BLOOD PRESSURE: 105 MMHG | OXYGEN SATURATION: 100 % | DIASTOLIC BLOOD PRESSURE: 73 MMHG

## 2019-10-02 DIAGNOSIS — C67.9 MALIGNANT NEOPLASM OF URINARY BLADDER, UNSPECIFIED SITE (HCC): Primary | ICD-10-CM

## 2019-10-02 LAB
ABO/RH: NORMAL
ANION GAP SERPL CALCULATED.3IONS-SCNC: 12 MMOL/L (ref 9–17)
ANTIBODY SCREEN: NEGATIVE
ARM BAND NUMBER: NORMAL
BLD PROD TYP BPU: NORMAL
BLD PROD TYP BPU: NORMAL
BUN BLDV-MCNC: 23 MG/DL (ref 8–23)
BUN/CREAT BLD: ABNORMAL (ref 9–20)
CALCIUM SERPL-MCNC: 8.9 MG/DL (ref 8.6–10.4)
CHLORIDE BLD-SCNC: 95 MMOL/L (ref 98–107)
CO2: 25 MMOL/L (ref 20–31)
CREAT SERPL-MCNC: 1.35 MG/DL (ref 0.7–1.2)
CROSSMATCH RESULT: NORMAL
CROSSMATCH RESULT: NORMAL
DISPENSE STATUS BLOOD BANK: NORMAL
DISPENSE STATUS BLOOD BANK: NORMAL
ESTIMATED AVERAGE GLUCOSE: 140 MG/DL
EXPIRATION DATE: NORMAL
GFR AFRICAN AMERICAN: >60 ML/MIN
GFR NON-AFRICAN AMERICAN: 52 ML/MIN
GFR SERPL CREATININE-BSD FRML MDRD: ABNORMAL ML/MIN/{1.73_M2}
GFR SERPL CREATININE-BSD FRML MDRD: ABNORMAL ML/MIN/{1.73_M2}
GLUCOSE BLD-MCNC: 126 MG/DL (ref 74–100)
GLUCOSE BLD-MCNC: 157 MG/DL (ref 75–110)
GLUCOSE BLD-MCNC: 171 MG/DL (ref 75–110)
GLUCOSE BLD-MCNC: 179 MG/DL (ref 70–99)
GLUCOSE BLD-MCNC: 179 MG/DL (ref 75–110)
HBA1C MFR BLD: 6.5 % (ref 4–6)
HCT VFR BLD CALC: 28.5 % (ref 40.7–50.3)
HEMOGLOBIN: 9.4 G/DL (ref 13–17)
MCH RBC QN AUTO: 35.1 PG (ref 25.2–33.5)
MCHC RBC AUTO-ENTMCNC: 33 G/DL (ref 28.4–34.8)
MCV RBC AUTO: 106.3 FL (ref 82.6–102.9)
NRBC AUTOMATED: 0 PER 100 WBC
PDW BLD-RTO: 16.9 % (ref 11.8–14.4)
PLATELET # BLD: 138 K/UL (ref 138–453)
PMV BLD AUTO: 10.1 FL (ref 8.1–13.5)
POC POTASSIUM: 4.4 MMOL/L (ref 3.5–4.5)
POTASSIUM SERPL-SCNC: 5.3 MMOL/L (ref 3.7–5.3)
RBC # BLD: 2.68 M/UL (ref 4.21–5.77)
SODIUM BLD-SCNC: 132 MMOL/L (ref 135–144)
TRANSFUSION STATUS: NORMAL
TRANSFUSION STATUS: NORMAL
UNIT DIVISION: 0
UNIT DIVISION: 0
UNIT NUMBER: NORMAL
UNIT NUMBER: NORMAL
WBC # BLD: 13 K/UL (ref 3.5–11.3)

## 2019-10-02 PROCEDURE — 36415 COLL VENOUS BLD VENIPUNCTURE: CPT

## 2019-10-02 PROCEDURE — 8E0W4CZ ROBOTIC ASSISTED PROCEDURE OF TRUNK REGION, PERCUTANEOUS ENDOSCOPIC APPROACH: ICD-10-PCS | Performed by: UROLOGY

## 2019-10-02 PROCEDURE — 2500000003 HC RX 250 WO HCPCS: Performed by: ANESTHESIOLOGY

## 2019-10-02 PROCEDURE — 86901 BLOOD TYPING SEROLOGIC RH(D): CPT

## 2019-10-02 PROCEDURE — 88307 TISSUE EXAM BY PATHOLOGIST: CPT

## 2019-10-02 PROCEDURE — 0DNG4ZZ RELEASE LEFT LARGE INTESTINE, PERCUTANEOUS ENDOSCOPIC APPROACH: ICD-10-PCS | Performed by: SURGERY

## 2019-10-02 PROCEDURE — 2580000003 HC RX 258: Performed by: SURGERY

## 2019-10-02 PROCEDURE — 1200000000 HC SEMI PRIVATE

## 2019-10-02 PROCEDURE — 2580000003 HC RX 258: Performed by: ANESTHESIOLOGY

## 2019-10-02 PROCEDURE — 0TTB4ZZ RESECTION OF BLADDER, PERCUTANEOUS ENDOSCOPIC APPROACH: ICD-10-PCS | Performed by: UROLOGY

## 2019-10-02 PROCEDURE — 3700000000 HC ANESTHESIA ATTENDED CARE: Performed by: UROLOGY

## 2019-10-02 PROCEDURE — 7100000001 HC PACU RECOVERY - ADDTL 15 MIN: Performed by: UROLOGY

## 2019-10-02 PROCEDURE — 6360000002 HC RX W HCPCS: Performed by: STUDENT IN AN ORGANIZED HEALTH CARE EDUCATION/TRAINING PROGRAM

## 2019-10-02 PROCEDURE — 6370000000 HC RX 637 (ALT 250 FOR IP): Performed by: STUDENT IN AN ORGANIZED HEALTH CARE EDUCATION/TRAINING PROGRAM

## 2019-10-02 PROCEDURE — 2709999900 HC NON-CHARGEABLE SUPPLY: Performed by: UROLOGY

## 2019-10-02 PROCEDURE — 0T1847C BYPASS BILATERAL URETERS TO ILEOCUTANEOUS WITH AUTOLOGOUS TISSUE SUBSTITUTE, PERCUTANEOUS ENDOSCOPIC APPROACH: ICD-10-PCS | Performed by: UROLOGY

## 2019-10-02 PROCEDURE — 64462 PVB THORACIC 2ND+ INJ SITE: CPT | Performed by: ANESTHESIOLOGY

## 2019-10-02 PROCEDURE — 2580000003 HC RX 258: Performed by: STUDENT IN AN ORGANIZED HEALTH CARE EDUCATION/TRAINING PROGRAM

## 2019-10-02 PROCEDURE — 2580000003 HC RX 258: Performed by: UROLOGY

## 2019-10-02 PROCEDURE — 82947 ASSAY GLUCOSE BLOOD QUANT: CPT

## 2019-10-02 PROCEDURE — 6360000002 HC RX W HCPCS: Performed by: NURSE ANESTHETIST, CERTIFIED REGISTERED

## 2019-10-02 PROCEDURE — 2780000010 HC IMPLANT OTHER: Performed by: UROLOGY

## 2019-10-02 PROCEDURE — 86850 RBC ANTIBODY SCREEN: CPT

## 2019-10-02 PROCEDURE — 84132 ASSAY OF SERUM POTASSIUM: CPT

## 2019-10-02 PROCEDURE — 2500000003 HC RX 250 WO HCPCS: Performed by: NURSE ANESTHETIST, CERTIFIED REGISTERED

## 2019-10-02 PROCEDURE — 7100000000 HC PACU RECOVERY - FIRST 15 MIN: Performed by: UROLOGY

## 2019-10-02 PROCEDURE — 83036 HEMOGLOBIN GLYCOSYLATED A1C: CPT

## 2019-10-02 PROCEDURE — 6370000000 HC RX 637 (ALT 250 FOR IP): Performed by: UROLOGY

## 2019-10-02 PROCEDURE — 86920 COMPATIBILITY TEST SPIN: CPT

## 2019-10-02 PROCEDURE — 6360000002 HC RX W HCPCS: Performed by: UROLOGY

## 2019-10-02 PROCEDURE — 88309 TISSUE EXAM BY PATHOLOGIST: CPT

## 2019-10-02 PROCEDURE — 80048 BASIC METABOLIC PNL TOTAL CA: CPT

## 2019-10-02 PROCEDURE — S2900 ROBOTIC SURGICAL SYSTEM: HCPCS | Performed by: UROLOGY

## 2019-10-02 PROCEDURE — 0VT04ZZ RESECTION OF PROSTATE, PERCUTANEOUS ENDOSCOPIC APPROACH: ICD-10-PCS | Performed by: UROLOGY

## 2019-10-02 PROCEDURE — 07BC4ZX EXCISION OF PELVIS LYMPHATIC, PERCUTANEOUS ENDOSCOPIC APPROACH, DIAGNOSTIC: ICD-10-PCS | Performed by: UROLOGY

## 2019-10-02 PROCEDURE — 2580000003 HC RX 258: Performed by: NURSE ANESTHETIST, CERTIFIED REGISTERED

## 2019-10-02 PROCEDURE — 2500000003 HC RX 250 WO HCPCS: Performed by: SURGERY

## 2019-10-02 PROCEDURE — 3600000019 HC SURGERY ROBOT ADDTL 15MIN: Performed by: UROLOGY

## 2019-10-02 PROCEDURE — 88305 TISSUE EXAM BY PATHOLOGIST: CPT

## 2019-10-02 PROCEDURE — 6360000002 HC RX W HCPCS

## 2019-10-02 PROCEDURE — 85027 COMPLETE CBC AUTOMATED: CPT

## 2019-10-02 PROCEDURE — 86900 BLOOD TYPING SEROLOGIC ABO: CPT

## 2019-10-02 PROCEDURE — 3600000009 HC SURGERY ROBOT BASE: Performed by: UROLOGY

## 2019-10-02 PROCEDURE — 3700000001 HC ADD 15 MINUTES (ANESTHESIA): Performed by: UROLOGY

## 2019-10-02 PROCEDURE — 88331 PATH CONSLTJ SURG 1 BLK 1SPC: CPT

## 2019-10-02 DEVICE — SEALANT HEMSTAT 5ML HUM FIBRIN THROM 2 VI APPL DEV EVICEL: Type: IMPLANTABLE DEVICE | Site: PELVIS | Status: FUNCTIONAL

## 2019-10-02 RX ORDER — MAGNESIUM CARB/ALUMINUM HYDROX 105-160MG
TABLET,CHEWABLE ORAL PRN
Status: DISCONTINUED | OUTPATIENT
Start: 2019-10-02 | End: 2019-10-02 | Stop reason: ALTCHOICE

## 2019-10-02 RX ORDER — PROPOFOL 10 MG/ML
INJECTION, EMULSION INTRAVENOUS PRN
Status: DISCONTINUED | OUTPATIENT
Start: 2019-10-02 | End: 2019-10-02 | Stop reason: SDUPTHER

## 2019-10-02 RX ORDER — ONDANSETRON 2 MG/ML
INJECTION INTRAMUSCULAR; INTRAVENOUS PRN
Status: DISCONTINUED | OUTPATIENT
Start: 2019-10-02 | End: 2019-10-02 | Stop reason: SDUPTHER

## 2019-10-02 RX ORDER — OXYCODONE HYDROCHLORIDE 5 MG/1
5 TABLET ORAL EVERY 4 HOURS PRN
Status: DISCONTINUED | OUTPATIENT
Start: 2019-10-02 | End: 2019-10-07 | Stop reason: HOSPADM

## 2019-10-02 RX ORDER — ALLOPURINOL 100 MG/1
100 TABLET ORAL NIGHTLY
Status: DISCONTINUED | OUTPATIENT
Start: 2019-10-02 | End: 2019-10-07 | Stop reason: HOSPADM

## 2019-10-02 RX ORDER — LIDOCAINE HYDROCHLORIDE 10 MG/ML
INJECTION, SOLUTION EPIDURAL; INFILTRATION; INTRACAUDAL; PERINEURAL PRN
Status: DISCONTINUED | OUTPATIENT
Start: 2019-10-02 | End: 2019-10-02 | Stop reason: SDUPTHER

## 2019-10-02 RX ORDER — ISOSORBIDE MONONITRATE 60 MG/1
60 TABLET, EXTENDED RELEASE ORAL DAILY
Status: DISCONTINUED | OUTPATIENT
Start: 2019-10-03 | End: 2019-10-07 | Stop reason: HOSPADM

## 2019-10-02 RX ORDER — SODIUM CHLORIDE 0.9 % (FLUSH) 0.9 %
10 SYRINGE (ML) INJECTION PRN
Status: DISCONTINUED | OUTPATIENT
Start: 2019-10-02 | End: 2019-10-07 | Stop reason: HOSPADM

## 2019-10-02 RX ORDER — HEPARIN SODIUM 5000 [USP'U]/ML
5000 INJECTION, SOLUTION INTRAVENOUS; SUBCUTANEOUS EVERY 8 HOURS SCHEDULED
Status: DISCONTINUED | OUTPATIENT
Start: 2019-10-02 | End: 2019-10-02 | Stop reason: SDUPTHER

## 2019-10-02 RX ORDER — LANSOPRAZOLE 30 MG/1
30 CAPSULE, DELAYED RELEASE ORAL
Status: DISCONTINUED | OUTPATIENT
Start: 2019-10-03 | End: 2019-10-02 | Stop reason: CLARIF

## 2019-10-02 RX ORDER — MIDAZOLAM HYDROCHLORIDE 1 MG/ML
INJECTION INTRAMUSCULAR; INTRAVENOUS PRN
Status: DISCONTINUED | OUTPATIENT
Start: 2019-10-02 | End: 2019-10-02 | Stop reason: SDUPTHER

## 2019-10-02 RX ORDER — ALVIMOPAN 12 MG/1
12 CAPSULE ORAL ONCE
Status: COMPLETED | OUTPATIENT
Start: 2019-10-02 | End: 2019-10-02

## 2019-10-02 RX ORDER — CEFAZOLIN SODIUM 2 G/50ML
SOLUTION INTRAVENOUS PRN
Status: DISCONTINUED | OUTPATIENT
Start: 2019-10-02 | End: 2019-10-02 | Stop reason: SDUPTHER

## 2019-10-02 RX ORDER — PROPOFOL 10 MG/ML
INJECTION, EMULSION INTRAVENOUS CONTINUOUS PRN
Status: DISCONTINUED | OUTPATIENT
Start: 2019-10-02 | End: 2019-10-02 | Stop reason: SDUPTHER

## 2019-10-02 RX ORDER — NEOSTIGMINE METHYLSULFATE 5 MG/5 ML
SYRINGE (ML) INTRAVENOUS PRN
Status: DISCONTINUED | OUTPATIENT
Start: 2019-10-02 | End: 2019-10-02 | Stop reason: SDUPTHER

## 2019-10-02 RX ORDER — ATENOLOL 25 MG/1
25 TABLET ORAL DAILY
Status: DISCONTINUED | OUTPATIENT
Start: 2019-10-03 | End: 2019-10-07 | Stop reason: HOSPADM

## 2019-10-02 RX ORDER — HEPARIN SODIUM 5000 [USP'U]/ML
5000 INJECTION, SOLUTION INTRAVENOUS; SUBCUTANEOUS
Status: DISCONTINUED | OUTPATIENT
Start: 2019-10-02 | End: 2019-10-02 | Stop reason: SDUPTHER

## 2019-10-02 RX ORDER — MAGNESIUM HYDROXIDE 1200 MG/15ML
LIQUID ORAL CONTINUOUS PRN
Status: COMPLETED | OUTPATIENT
Start: 2019-10-02 | End: 2019-10-02

## 2019-10-02 RX ORDER — OXYCODONE HYDROCHLORIDE 5 MG/1
10 TABLET ORAL EVERY 4 HOURS PRN
Status: DISCONTINUED | OUTPATIENT
Start: 2019-10-02 | End: 2019-10-07 | Stop reason: HOSPADM

## 2019-10-02 RX ORDER — ATORVASTATIN CALCIUM 40 MG/1
40 TABLET, FILM COATED ORAL NIGHTLY
Status: DISCONTINUED | OUTPATIENT
Start: 2019-10-02 | End: 2019-10-07 | Stop reason: HOSPADM

## 2019-10-02 RX ORDER — GLYCOPYRROLATE 1 MG/5 ML
SYRINGE (ML) INTRAVENOUS PRN
Status: DISCONTINUED | OUTPATIENT
Start: 2019-10-02 | End: 2019-10-02 | Stop reason: SDUPTHER

## 2019-10-02 RX ORDER — GABAPENTIN 600 MG/1
TABLET ORAL
Status: DISCONTINUED
Start: 2019-10-02 | End: 2019-10-02

## 2019-10-02 RX ORDER — ROCURONIUM BROMIDE 10 MG/ML
INJECTION, SOLUTION INTRAVENOUS PRN
Status: DISCONTINUED | OUTPATIENT
Start: 2019-10-02 | End: 2019-10-02 | Stop reason: SDUPTHER

## 2019-10-02 RX ORDER — BUPIVACAINE HYDROCHLORIDE 5 MG/ML
INJECTION, SOLUTION EPIDURAL; INTRACAUDAL
Status: COMPLETED | OUTPATIENT
Start: 2019-10-02 | End: 2019-10-02

## 2019-10-02 RX ORDER — ONDANSETRON 2 MG/ML
4 INJECTION INTRAMUSCULAR; INTRAVENOUS EVERY 6 HOURS PRN
Status: DISCONTINUED | OUTPATIENT
Start: 2019-10-02 | End: 2019-10-07 | Stop reason: HOSPADM

## 2019-10-02 RX ORDER — GABAPENTIN 300 MG/1
600 CAPSULE ORAL ONCE
Status: COMPLETED | OUTPATIENT
Start: 2019-10-02 | End: 2019-10-02

## 2019-10-02 RX ORDER — NICOTINE POLACRILEX 4 MG
15 LOZENGE BUCCAL PRN
Status: DISCONTINUED | OUTPATIENT
Start: 2019-10-02 | End: 2019-10-07 | Stop reason: HOSPADM

## 2019-10-02 RX ORDER — FUROSEMIDE 40 MG/1
80 TABLET ORAL DAILY
Status: DISCONTINUED | OUTPATIENT
Start: 2019-10-02 | End: 2019-10-07 | Stop reason: HOSPADM

## 2019-10-02 RX ORDER — KETOROLAC TROMETHAMINE 30 MG/ML
INJECTION, SOLUTION INTRAMUSCULAR; INTRAVENOUS PRN
Status: DISCONTINUED | OUTPATIENT
Start: 2019-10-02 | End: 2019-10-02 | Stop reason: SDUPTHER

## 2019-10-02 RX ORDER — MORPHINE SULFATE 15 MG/1
30 TABLET, FILM COATED, EXTENDED RELEASE ORAL 3 TIMES DAILY
Status: DISCONTINUED | OUTPATIENT
Start: 2019-10-02 | End: 2019-10-07 | Stop reason: HOSPADM

## 2019-10-02 RX ORDER — KETAMINE HYDROCHLORIDE 10 MG/ML
INJECTION, SOLUTION INTRAMUSCULAR; INTRAVENOUS PRN
Status: DISCONTINUED | OUTPATIENT
Start: 2019-10-02 | End: 2019-10-02 | Stop reason: SDUPTHER

## 2019-10-02 RX ORDER — ACETAMINOPHEN 500 MG
1000 TABLET ORAL ONCE
Status: COMPLETED | OUTPATIENT
Start: 2019-10-02 | End: 2019-10-02

## 2019-10-02 RX ORDER — MAGNESIUM HYDROXIDE 1200 MG/15ML
LIQUID ORAL PRN
Status: DISCONTINUED | OUTPATIENT
Start: 2019-10-02 | End: 2019-10-02 | Stop reason: ALTCHOICE

## 2019-10-02 RX ORDER — MIDAZOLAM HYDROCHLORIDE 1 MG/ML
INJECTION INTRAMUSCULAR; INTRAVENOUS
Status: COMPLETED
Start: 2019-10-02 | End: 2019-10-02

## 2019-10-02 RX ORDER — SODIUM CHLORIDE 0.9 % (FLUSH) 0.9 %
10 SYRINGE (ML) INJECTION EVERY 12 HOURS SCHEDULED
Status: DISCONTINUED | OUTPATIENT
Start: 2019-10-02 | End: 2019-10-02 | Stop reason: HOSPADM

## 2019-10-02 RX ORDER — FENTANYL CITRATE 50 UG/ML
INJECTION, SOLUTION INTRAMUSCULAR; INTRAVENOUS
Status: COMPLETED
Start: 2019-10-02 | End: 2019-10-02

## 2019-10-02 RX ORDER — DEXAMETHASONE SODIUM PHOSPHATE 10 MG/ML
INJECTION INTRAMUSCULAR; INTRAVENOUS PRN
Status: DISCONTINUED | OUTPATIENT
Start: 2019-10-02 | End: 2019-10-02 | Stop reason: SDUPTHER

## 2019-10-02 RX ORDER — ACETAMINOPHEN 325 MG/1
650 TABLET ORAL EVERY 6 HOURS
Status: DISCONTINUED | OUTPATIENT
Start: 2019-10-02 | End: 2019-10-07 | Stop reason: HOSPADM

## 2019-10-02 RX ORDER — DOCUSATE SODIUM 100 MG/1
100 CAPSULE, LIQUID FILLED ORAL 2 TIMES DAILY
Status: DISCONTINUED | OUTPATIENT
Start: 2019-10-02 | End: 2019-10-07 | Stop reason: HOSPADM

## 2019-10-02 RX ORDER — DEXTROSE MONOHYDRATE 25 G/50ML
12.5 INJECTION, SOLUTION INTRAVENOUS PRN
Status: DISCONTINUED | OUTPATIENT
Start: 2019-10-02 | End: 2019-10-07 | Stop reason: HOSPADM

## 2019-10-02 RX ORDER — HEPARIN SODIUM 5000 [USP'U]/ML
5000 INJECTION, SOLUTION INTRAVENOUS; SUBCUTANEOUS ONCE
Status: COMPLETED | OUTPATIENT
Start: 2019-10-02 | End: 2019-10-02

## 2019-10-02 RX ORDER — PROCHLORPERAZINE MALEATE 10 MG
10 TABLET ORAL EVERY 6 HOURS PRN
Status: DISCONTINUED | OUTPATIENT
Start: 2019-10-02 | End: 2019-10-07 | Stop reason: HOSPADM

## 2019-10-02 RX ORDER — POLYETHYLENE GLYCOL 3350 17 G/17G
17 POWDER, FOR SOLUTION ORAL DAILY PRN
Status: DISCONTINUED | OUTPATIENT
Start: 2019-10-02 | End: 2019-10-07 | Stop reason: HOSPADM

## 2019-10-02 RX ORDER — SODIUM CHLORIDE, SODIUM LACTATE, POTASSIUM CHLORIDE, CALCIUM CHLORIDE 600; 310; 30; 20 MG/100ML; MG/100ML; MG/100ML; MG/100ML
INJECTION, SOLUTION INTRAVENOUS CONTINUOUS
Status: DISCONTINUED | OUTPATIENT
Start: 2019-10-02 | End: 2019-10-02

## 2019-10-02 RX ORDER — HEPARIN SODIUM 5000 [USP'U]/ML
5000 INJECTION, SOLUTION INTRAVENOUS; SUBCUTANEOUS EVERY 8 HOURS SCHEDULED
Status: DISCONTINUED | OUTPATIENT
Start: 2019-10-02 | End: 2019-10-07 | Stop reason: HOSPADM

## 2019-10-02 RX ORDER — SODIUM CHLORIDE 9 MG/ML
INJECTION, SOLUTION INTRAVENOUS CONTINUOUS
Status: DISCONTINUED | OUTPATIENT
Start: 2019-10-02 | End: 2019-10-07 | Stop reason: HOSPADM

## 2019-10-02 RX ORDER — SODIUM CHLORIDE, SODIUM LACTATE, POTASSIUM CHLORIDE, CALCIUM CHLORIDE 600; 310; 30; 20 MG/100ML; MG/100ML; MG/100ML; MG/100ML
1000 INJECTION, SOLUTION INTRAVENOUS CONTINUOUS
Status: DISCONTINUED | OUTPATIENT
Start: 2019-10-02 | End: 2019-10-02

## 2019-10-02 RX ORDER — CAPTOPRIL 25 MG/1
25 TABLET ORAL 2 TIMES DAILY
Status: DISCONTINUED | OUTPATIENT
Start: 2019-10-03 | End: 2019-10-07 | Stop reason: HOSPADM

## 2019-10-02 RX ORDER — SYRING-NEEDL,DISP,INSUL,0.3 ML 30 GX5/16"
1 SYRINGE, EMPTY DISPOSABLE MISCELLANEOUS 3 TIMES DAILY
Status: DISCONTINUED | OUTPATIENT
Start: 2019-10-02 | End: 2019-10-02 | Stop reason: RX

## 2019-10-02 RX ORDER — M-VIT,TX,IRON,MINS/CALC/FOLIC 27MG-0.4MG
1 TABLET ORAL DAILY
Status: DISCONTINUED | OUTPATIENT
Start: 2019-10-02 | End: 2019-10-07 | Stop reason: HOSPADM

## 2019-10-02 RX ORDER — SODIUM CHLORIDE 0.9 % (FLUSH) 0.9 %
10 SYRINGE (ML) INJECTION PRN
Status: DISCONTINUED | OUTPATIENT
Start: 2019-10-02 | End: 2019-10-02 | Stop reason: HOSPADM

## 2019-10-02 RX ORDER — SODIUM CHLORIDE 0.9 % (FLUSH) 0.9 %
10 SYRINGE (ML) INJECTION EVERY 12 HOURS SCHEDULED
Status: DISCONTINUED | OUTPATIENT
Start: 2019-10-02 | End: 2019-10-07 | Stop reason: HOSPADM

## 2019-10-02 RX ORDER — RANOLAZINE 500 MG/1
500 TABLET, EXTENDED RELEASE ORAL 2 TIMES DAILY
Status: DISCONTINUED | OUTPATIENT
Start: 2019-10-02 | End: 2019-10-07 | Stop reason: HOSPADM

## 2019-10-02 RX ORDER — DEXTROSE MONOHYDRATE 50 MG/ML
100 INJECTION, SOLUTION INTRAVENOUS PRN
Status: DISCONTINUED | OUTPATIENT
Start: 2019-10-02 | End: 2019-10-07 | Stop reason: HOSPADM

## 2019-10-02 RX ORDER — PANTOPRAZOLE SODIUM 40 MG/1
40 TABLET, DELAYED RELEASE ORAL
Status: DISCONTINUED | OUTPATIENT
Start: 2019-10-03 | End: 2019-10-07 | Stop reason: HOSPADM

## 2019-10-02 RX ADMIN — Medication 500 ML: at 14:28

## 2019-10-02 RX ADMIN — CEFOXITIN SODIUM 1 G: 1 POWDER, FOR SOLUTION INTRAVENOUS at 12:10

## 2019-10-02 RX ADMIN — SODIUM CHLORIDE, POTASSIUM CHLORIDE, SODIUM LACTATE AND CALCIUM CHLORIDE: 600; 310; 30; 20 INJECTION, SOLUTION INTRAVENOUS at 10:36

## 2019-10-02 RX ADMIN — PHENYLEPHRINE HYDROCHLORIDE 300 MCG: 10 INJECTION INTRAVENOUS at 07:58

## 2019-10-02 RX ADMIN — FUROSEMIDE 80 MG: 40 TABLET ORAL at 16:46

## 2019-10-02 RX ADMIN — SODIUM CHLORIDE: 9 INJECTION, SOLUTION INTRAVENOUS at 15:32

## 2019-10-02 RX ADMIN — BUPIVACAINE HYDROCHLORIDE 40 ML: 5 INJECTION, SOLUTION EPIDURAL; INTRACAUDAL; PERINEURAL at 07:24

## 2019-10-02 RX ADMIN — DESMOPRESSIN ACETATE 40 MG: 0.2 TABLET ORAL at 20:12

## 2019-10-02 RX ADMIN — Medication 10 ML: at 22:48

## 2019-10-02 RX ADMIN — PHENYLEPHRINE HYDROCHLORIDE 200 MCG: 10 INJECTION INTRAVENOUS at 07:57

## 2019-10-02 RX ADMIN — HEPARIN SODIUM 5000 UNITS: 5000 INJECTION INTRAVENOUS; SUBCUTANEOUS at 22:50

## 2019-10-02 RX ADMIN — HEPARIN SODIUM 5000 UNITS: 5000 INJECTION INTRAVENOUS; SUBCUTANEOUS at 07:04

## 2019-10-02 RX ADMIN — Medication 500 ML: at 17:38

## 2019-10-02 RX ADMIN — GABAPENTIN 600 MG: 300 CAPSULE ORAL at 06:43

## 2019-10-02 RX ADMIN — DOCUSATE SODIUM 100 MG: 100 CAPSULE, LIQUID FILLED ORAL at 20:12

## 2019-10-02 RX ADMIN — ROCURONIUM BROMIDE 50 MG: 10 INJECTION INTRAVENOUS at 07:49

## 2019-10-02 RX ADMIN — FENTANYL CITRATE 100 MCG: 50 INJECTION INTRAMUSCULAR; INTRAVENOUS at 07:14

## 2019-10-02 RX ADMIN — OXYCODONE HYDROCHLORIDE 10 MG: 5 TABLET ORAL at 16:57

## 2019-10-02 RX ADMIN — PROPOFOL 125 MCG/KG/MIN: 10 INJECTION, EMULSION INTRAVENOUS at 08:00

## 2019-10-02 RX ADMIN — BUPIVACAINE HYDROCHLORIDE 10 ML: 5 INJECTION, SOLUTION EPIDURAL; INTRACAUDAL; PERINEURAL at 12:09

## 2019-10-02 RX ADMIN — KETAMINE HYDROCHLORIDE 5 MCG/KG/MIN: 100 INJECTION, SOLUTION, CONCENTRATE INTRAMUSCULAR; INTRAVENOUS at 08:00

## 2019-10-02 RX ADMIN — PHENYLEPHRINE HYDROCHLORIDE 150 MCG/MIN: 10 INJECTION INTRAVENOUS at 08:00

## 2019-10-02 RX ADMIN — ONDANSETRON 4 MG: 2 INJECTION, SOLUTION INTRAMUSCULAR; INTRAVENOUS at 13:31

## 2019-10-02 RX ADMIN — LIDOCAINE HYDROCHLORIDE 30 ML/HR: 20 INJECTION, SOLUTION INFILTRATION; PERINEURAL at 08:00

## 2019-10-02 RX ADMIN — MIDAZOLAM HYDROCHLORIDE 1 MG: 1 INJECTION, SOLUTION INTRAMUSCULAR; INTRAVENOUS at 08:02

## 2019-10-02 RX ADMIN — ACETAMINOPHEN 650 MG: 325 TABLET ORAL at 20:10

## 2019-10-02 RX ADMIN — SODIUM CHLORIDE, POTASSIUM CHLORIDE, SODIUM LACTATE AND CALCIUM CHLORIDE 1000 ML: 600; 310; 30; 20 INJECTION, SOLUTION INTRAVENOUS at 06:45

## 2019-10-02 RX ADMIN — MULTIPLE VITAMINS W/ MINERALS TAB 1 TABLET: TAB at 16:59

## 2019-10-02 RX ADMIN — ACETAMINOPHEN 650 MG: 325 TABLET ORAL at 16:46

## 2019-10-02 RX ADMIN — ALLOPURINOL 100 MG: 100 TABLET ORAL at 20:12

## 2019-10-02 RX ADMIN — ROCURONIUM BROMIDE 25 MG: 10 INJECTION INTRAVENOUS at 10:34

## 2019-10-02 RX ADMIN — SODIUM CHLORIDE, POTASSIUM CHLORIDE, SODIUM LACTATE AND CALCIUM CHLORIDE: 600; 310; 30; 20 INJECTION, SOLUTION INTRAVENOUS at 09:21

## 2019-10-02 RX ADMIN — SODIUM CHLORIDE, POTASSIUM CHLORIDE, SODIUM LACTATE AND CALCIUM CHLORIDE: 600; 310; 30; 20 INJECTION, SOLUTION INTRAVENOUS at 08:00

## 2019-10-02 RX ADMIN — KETOROLAC TROMETHAMINE 30 MG: 30 INJECTION, SOLUTION INTRAMUSCULAR; INTRAVENOUS at 13:31

## 2019-10-02 RX ADMIN — Medication 3 MG: at 13:33

## 2019-10-02 RX ADMIN — PROPOFOL 80 MG: 10 INJECTION, EMULSION INTRAVENOUS at 07:49

## 2019-10-02 RX ADMIN — Medication 0.2 MG: at 11:35

## 2019-10-02 RX ADMIN — CEFOXITIN SODIUM 1 G: 1 POWDER, FOR SOLUTION INTRAVENOUS at 09:21

## 2019-10-02 RX ADMIN — Medication 500 ML: at 14:36

## 2019-10-02 RX ADMIN — MIDAZOLAM HYDROCHLORIDE 2 MG: 1 INJECTION, SOLUTION INTRAMUSCULAR; INTRAVENOUS at 07:49

## 2019-10-02 RX ADMIN — ROCURONIUM BROMIDE 50 MG: 10 INJECTION INTRAVENOUS at 09:06

## 2019-10-02 RX ADMIN — Medication 0.4 MG: at 13:33

## 2019-10-02 RX ADMIN — CEFAZOLIN SODIUM 2 G: 2 SOLUTION INTRAVENOUS at 08:15

## 2019-10-02 RX ADMIN — ROCURONIUM BROMIDE 25 MG: 10 INJECTION INTRAVENOUS at 12:03

## 2019-10-02 RX ADMIN — RANOLAZINE 500 MG: 500 TABLET, FILM COATED, EXTENDED RELEASE ORAL at 20:12

## 2019-10-02 RX ADMIN — BUPIVACAINE HYDROCHLORIDE 40 ML: 5 INJECTION, SOLUTION EPIDURAL; INTRACAUDAL; PERINEURAL at 07:15

## 2019-10-02 RX ADMIN — LIDOCAINE HYDROCHLORIDE 50 MG: 10 INJECTION, SOLUTION EPIDURAL; INFILTRATION; INTRACAUDAL; PERINEURAL at 07:49

## 2019-10-02 RX ADMIN — INSULIN LISPRO 1 UNITS: 100 INJECTION, SOLUTION INTRAVENOUS; SUBCUTANEOUS at 17:39

## 2019-10-02 RX ADMIN — ALVIMOPAN 12 MG: 12 CAPSULE ORAL at 06:43

## 2019-10-02 RX ADMIN — Medication 0.4 MG: at 08:05

## 2019-10-02 RX ADMIN — KETAMINE HYDROCHLORIDE 50 MG: 10 INJECTION INTRAMUSCULAR; INTRAVENOUS at 07:49

## 2019-10-02 RX ADMIN — MORPHINE SULFATE 30 MG: 15 TABLET, EXTENDED RELEASE ORAL at 20:24

## 2019-10-02 RX ADMIN — MAGNESIUM GLUCONATE 500 MG ORAL TABLET 400 MG: 500 TABLET ORAL at 16:46

## 2019-10-02 RX ADMIN — CEFOXITIN SODIUM 2 G: 2 POWDER, FOR SOLUTION INTRAVENOUS at 20:09

## 2019-10-02 RX ADMIN — ACETAMINOPHEN 1000 MG: 500 TABLET ORAL at 06:43

## 2019-10-02 RX ADMIN — DEXAMETHASONE SODIUM PHOSPHATE 10 MG: 10 INJECTION INTRAMUSCULAR; INTRAVENOUS at 07:49

## 2019-10-02 ASSESSMENT — PULMONARY FUNCTION TESTS
PIF_VALUE: 16
PIF_VALUE: 5
PIF_VALUE: 36
PIF_VALUE: 1
PIF_VALUE: 27
PIF_VALUE: 35
PIF_VALUE: 33
PIF_VALUE: 37
PIF_VALUE: 27
PIF_VALUE: 18
PIF_VALUE: 24
PIF_VALUE: 35
PIF_VALUE: 1
PIF_VALUE: 17
PIF_VALUE: 17
PIF_VALUE: 36
PIF_VALUE: 27
PIF_VALUE: 35
PIF_VALUE: 30
PIF_VALUE: 26
PIF_VALUE: 35
PIF_VALUE: 34
PIF_VALUE: 25
PIF_VALUE: 33
PIF_VALUE: 19
PIF_VALUE: 35
PIF_VALUE: 25
PIF_VALUE: 34
PIF_VALUE: 26
PIF_VALUE: 25
PIF_VALUE: 25
PIF_VALUE: 5
PIF_VALUE: 35
PIF_VALUE: 0
PIF_VALUE: 23
PIF_VALUE: 16
PIF_VALUE: 30
PIF_VALUE: 36
PIF_VALUE: 24
PIF_VALUE: 29
PIF_VALUE: 17
PIF_VALUE: 33
PIF_VALUE: 23
PIF_VALUE: 35
PIF_VALUE: 19
PIF_VALUE: 17
PIF_VALUE: 16
PIF_VALUE: 23
PIF_VALUE: 35
PIF_VALUE: 17
PIF_VALUE: 35
PIF_VALUE: 26
PIF_VALUE: 15
PIF_VALUE: 36
PIF_VALUE: 25
PIF_VALUE: 24
PIF_VALUE: 25
PIF_VALUE: 26
PIF_VALUE: 26
PIF_VALUE: 24
PIF_VALUE: 14
PIF_VALUE: 35
PIF_VALUE: 25
PIF_VALUE: 24
PIF_VALUE: 25
PIF_VALUE: 24
PIF_VALUE: 26
PIF_VALUE: 36
PIF_VALUE: 23
PIF_VALUE: 17
PIF_VALUE: 36
PIF_VALUE: 20
PIF_VALUE: 38
PIF_VALUE: 25
PIF_VALUE: 14
PIF_VALUE: 36
PIF_VALUE: 35
PIF_VALUE: 17
PIF_VALUE: 37
PIF_VALUE: 23
PIF_VALUE: 33
PIF_VALUE: 27
PIF_VALUE: 25
PIF_VALUE: 34
PIF_VALUE: 17
PIF_VALUE: 34
PIF_VALUE: 14
PIF_VALUE: 24
PIF_VALUE: 35
PIF_VALUE: 16
PIF_VALUE: 26
PIF_VALUE: 35
PIF_VALUE: 30
PIF_VALUE: 4
PIF_VALUE: 36
PIF_VALUE: 29
PIF_VALUE: 24
PIF_VALUE: 35
PIF_VALUE: 25
PIF_VALUE: 37
PIF_VALUE: 35
PIF_VALUE: 17
PIF_VALUE: 17
PIF_VALUE: 26
PIF_VALUE: 28
PIF_VALUE: 36
PIF_VALUE: 36
PIF_VALUE: 23
PIF_VALUE: 1
PIF_VALUE: 18
PIF_VALUE: 23
PIF_VALUE: 33
PIF_VALUE: 23
PIF_VALUE: 36
PIF_VALUE: 17
PIF_VALUE: 25
PIF_VALUE: 17
PIF_VALUE: 34
PIF_VALUE: 18
PIF_VALUE: 26
PIF_VALUE: 23
PIF_VALUE: 36
PIF_VALUE: 25
PIF_VALUE: 36
PIF_VALUE: 23
PIF_VALUE: 26
PIF_VALUE: 17
PIF_VALUE: 23
PIF_VALUE: 0
PIF_VALUE: 23
PIF_VALUE: 28
PIF_VALUE: 16
PIF_VALUE: 34
PIF_VALUE: 17
PIF_VALUE: 17
PIF_VALUE: 26
PIF_VALUE: 35
PIF_VALUE: 30
PIF_VALUE: 18
PIF_VALUE: 30
PIF_VALUE: 29
PIF_VALUE: 1
PIF_VALUE: 34
PIF_VALUE: 29
PIF_VALUE: 35
PIF_VALUE: 34
PIF_VALUE: 17
PIF_VALUE: 23
PIF_VALUE: 24
PIF_VALUE: 35
PIF_VALUE: 15
PIF_VALUE: 25
PIF_VALUE: 26
PIF_VALUE: 34
PIF_VALUE: 23
PIF_VALUE: 25
PIF_VALUE: 36
PIF_VALUE: 37
PIF_VALUE: 34
PIF_VALUE: 27
PIF_VALUE: 34
PIF_VALUE: 35
PIF_VALUE: 36
PIF_VALUE: 17
PIF_VALUE: 18
PIF_VALUE: 18
PIF_VALUE: 12
PIF_VALUE: 21
PIF_VALUE: 36
PIF_VALUE: 26
PIF_VALUE: 35
PIF_VALUE: 27
PIF_VALUE: 36
PIF_VALUE: 34
PIF_VALUE: 26
PIF_VALUE: 35
PIF_VALUE: 29
PIF_VALUE: 23
PIF_VALUE: 23
PIF_VALUE: 27
PIF_VALUE: 19
PIF_VALUE: 23
PIF_VALUE: 30
PIF_VALUE: 35
PIF_VALUE: 29
PIF_VALUE: 19
PIF_VALUE: 17
PIF_VALUE: 19
PIF_VALUE: 3
PIF_VALUE: 26
PIF_VALUE: 36
PIF_VALUE: 27
PIF_VALUE: 35
PIF_VALUE: 26
PIF_VALUE: 25
PIF_VALUE: 17
PIF_VALUE: 27
PIF_VALUE: 16
PIF_VALUE: 17
PIF_VALUE: 25
PIF_VALUE: 18
PIF_VALUE: 23
PIF_VALUE: 14
PIF_VALUE: 36
PIF_VALUE: 35
PIF_VALUE: 29
PIF_VALUE: 18
PIF_VALUE: 25
PIF_VALUE: 35
PIF_VALUE: 26
PIF_VALUE: 15
PIF_VALUE: 26
PIF_VALUE: 17
PIF_VALUE: 35
PIF_VALUE: 36
PIF_VALUE: 35
PIF_VALUE: 19
PIF_VALUE: 34
PIF_VALUE: 24
PIF_VALUE: 26
PIF_VALUE: 27
PIF_VALUE: 17
PIF_VALUE: 25
PIF_VALUE: 21
PIF_VALUE: 24
PIF_VALUE: 27
PIF_VALUE: 19
PIF_VALUE: 33
PIF_VALUE: 14
PIF_VALUE: 27
PIF_VALUE: 36
PIF_VALUE: 24
PIF_VALUE: 28
PIF_VALUE: 34
PIF_VALUE: 34
PIF_VALUE: 19
PIF_VALUE: 35
PIF_VALUE: 17
PIF_VALUE: 25
PIF_VALUE: 5
PIF_VALUE: 26
PIF_VALUE: 27
PIF_VALUE: 25
PIF_VALUE: 18
PIF_VALUE: 36
PIF_VALUE: 24
PIF_VALUE: 28
PIF_VALUE: 27
PIF_VALUE: 19
PIF_VALUE: 35
PIF_VALUE: 23
PIF_VALUE: 17
PIF_VALUE: 24
PIF_VALUE: 35
PIF_VALUE: 33
PIF_VALUE: 25
PIF_VALUE: 17
PIF_VALUE: 18
PIF_VALUE: 35
PIF_VALUE: 26
PIF_VALUE: 16
PIF_VALUE: 35
PIF_VALUE: 29
PIF_VALUE: 30
PIF_VALUE: 30
PIF_VALUE: 37
PIF_VALUE: 27
PIF_VALUE: 37
PIF_VALUE: 17
PIF_VALUE: 35
PIF_VALUE: 16
PIF_VALUE: 26
PIF_VALUE: 35
PIF_VALUE: 18
PIF_VALUE: 36
PIF_VALUE: 16
PIF_VALUE: 2
PIF_VALUE: 2
PIF_VALUE: 23
PIF_VALUE: 29
PIF_VALUE: 36
PIF_VALUE: 23
PIF_VALUE: 26
PIF_VALUE: 36
PIF_VALUE: 14
PIF_VALUE: 30
PIF_VALUE: 20
PIF_VALUE: 23
PIF_VALUE: 26
PIF_VALUE: 26
PIF_VALUE: 17
PIF_VALUE: 35
PIF_VALUE: 16
PIF_VALUE: 25
PIF_VALUE: 27
PIF_VALUE: 24
PIF_VALUE: 36
PIF_VALUE: 18
PIF_VALUE: 17
PIF_VALUE: 27
PIF_VALUE: 17
PIF_VALUE: 30
PIF_VALUE: 35
PIF_VALUE: 25
PIF_VALUE: 35
PIF_VALUE: 30
PIF_VALUE: 33
PIF_VALUE: 28
PIF_VALUE: 23
PIF_VALUE: 29
PIF_VALUE: 36
PIF_VALUE: 23
PIF_VALUE: 25
PIF_VALUE: 24
PIF_VALUE: 1
PIF_VALUE: 17
PIF_VALUE: 34
PIF_VALUE: 34
PIF_VALUE: 30
PIF_VALUE: 32
PIF_VALUE: 5
PIF_VALUE: 36
PIF_VALUE: 25
PIF_VALUE: 24
PIF_VALUE: 36
PIF_VALUE: 20
PIF_VALUE: 24
PIF_VALUE: 35
PIF_VALUE: 2
PIF_VALUE: 27
PIF_VALUE: 0
PIF_VALUE: 26
PIF_VALUE: 36
PIF_VALUE: 26
PIF_VALUE: 26
PIF_VALUE: 17
PIF_VALUE: 21
PIF_VALUE: 18
PIF_VALUE: 19
PIF_VALUE: 14
PIF_VALUE: 16
PIF_VALUE: 34
PIF_VALUE: 2
PIF_VALUE: 34
PIF_VALUE: 37
PIF_VALUE: 36
PIF_VALUE: 24
PIF_VALUE: 37
PIF_VALUE: 28
PIF_VALUE: 2
PIF_VALUE: 30
PIF_VALUE: 26
PIF_VALUE: 1
PIF_VALUE: 24
PIF_VALUE: 36
PIF_VALUE: 24
PIF_VALUE: 36
PIF_VALUE: 35
PIF_VALUE: 36
PIF_VALUE: 33
PIF_VALUE: 24
PIF_VALUE: 30

## 2019-10-02 ASSESSMENT — PAIN SCALES - GENERAL
PAINLEVEL_OUTOF10: 5
PAINLEVEL_OUTOF10: 7
PAINLEVEL_OUTOF10: 5
PAINLEVEL_OUTOF10: 4

## 2019-10-02 ASSESSMENT — PAIN DESCRIPTION - PAIN TYPE: TYPE: SURGICAL PAIN;CHRONIC PAIN

## 2019-10-02 ASSESSMENT — PAIN DESCRIPTION - PROGRESSION: CLINICAL_PROGRESSION: NOT CHANGED

## 2019-10-02 ASSESSMENT — PAIN SCALES - WONG BAKER
WONGBAKER_NUMERICALRESPONSE: 0

## 2019-10-02 ASSESSMENT — PAIN DESCRIPTION - LOCATION: LOCATION: ABDOMEN;BACK

## 2019-10-02 ASSESSMENT — PAIN DESCRIPTION - ONSET: ONSET: ON-GOING

## 2019-10-02 ASSESSMENT — PAIN DESCRIPTION - DESCRIPTORS: DESCRIPTORS: ACHING;CRAMPING

## 2019-10-02 ASSESSMENT — PAIN DESCRIPTION - ORIENTATION: ORIENTATION: LEFT;MID

## 2019-10-02 ASSESSMENT — PAIN - FUNCTIONAL ASSESSMENT: PAIN_FUNCTIONAL_ASSESSMENT: 0-10

## 2019-10-02 ASSESSMENT — PAIN DESCRIPTION - FREQUENCY: FREQUENCY: CONTINUOUS

## 2019-10-03 LAB
ANION GAP SERPL CALCULATED.3IONS-SCNC: 15 MMOL/L (ref 9–17)
BUN BLDV-MCNC: 24 MG/DL (ref 8–23)
BUN/CREAT BLD: ABNORMAL (ref 9–20)
CALCIUM SERPL-MCNC: 8.5 MG/DL (ref 8.6–10.4)
CHLORIDE BLD-SCNC: 101 MMOL/L (ref 98–107)
CO2: 20 MMOL/L (ref 20–31)
CREAT SERPL-MCNC: 1.47 MG/DL (ref 0.7–1.2)
GFR AFRICAN AMERICAN: 57 ML/MIN
GFR NON-AFRICAN AMERICAN: 47 ML/MIN
GFR SERPL CREATININE-BSD FRML MDRD: ABNORMAL ML/MIN/{1.73_M2}
GFR SERPL CREATININE-BSD FRML MDRD: ABNORMAL ML/MIN/{1.73_M2}
GLUCOSE BLD-MCNC: 117 MG/DL (ref 75–110)
GLUCOSE BLD-MCNC: 128 MG/DL (ref 70–99)
GLUCOSE BLD-MCNC: 96 MG/DL (ref 75–110)
GLUCOSE BLD-MCNC: 97 MG/DL (ref 75–110)
MAGNESIUM: 1.9 MG/DL (ref 1.6–2.6)
PHOSPHORUS: 4.4 MG/DL (ref 2.5–4.5)
POTASSIUM SERPL-SCNC: 5 MMOL/L (ref 3.7–5.3)
SODIUM BLD-SCNC: 136 MMOL/L (ref 135–144)

## 2019-10-03 PROCEDURE — 36415 COLL VENOUS BLD VENIPUNCTURE: CPT

## 2019-10-03 PROCEDURE — 80048 BASIC METABOLIC PNL TOTAL CA: CPT

## 2019-10-03 PROCEDURE — 94664 DEMO&/EVAL PT USE INHALER: CPT

## 2019-10-03 PROCEDURE — 6360000002 HC RX W HCPCS: Performed by: STUDENT IN AN ORGANIZED HEALTH CARE EDUCATION/TRAINING PROGRAM

## 2019-10-03 PROCEDURE — 83735 ASSAY OF MAGNESIUM: CPT

## 2019-10-03 PROCEDURE — 82947 ASSAY GLUCOSE BLOOD QUANT: CPT

## 2019-10-03 PROCEDURE — 94760 N-INVAS EAR/PLS OXIMETRY 1: CPT

## 2019-10-03 PROCEDURE — 2580000003 HC RX 258: Performed by: STUDENT IN AN ORGANIZED HEALTH CARE EDUCATION/TRAINING PROGRAM

## 2019-10-03 PROCEDURE — 1200000000 HC SEMI PRIVATE

## 2019-10-03 PROCEDURE — 6370000000 HC RX 637 (ALT 250 FOR IP): Performed by: STUDENT IN AN ORGANIZED HEALTH CARE EDUCATION/TRAINING PROGRAM

## 2019-10-03 PROCEDURE — 84100 ASSAY OF PHOSPHORUS: CPT

## 2019-10-03 PROCEDURE — 94640 AIRWAY INHALATION TREATMENT: CPT

## 2019-10-03 RX ADMIN — MORPHINE SULFATE 30 MG: 15 TABLET, EXTENDED RELEASE ORAL at 18:30

## 2019-10-03 RX ADMIN — ACETAMINOPHEN 650 MG: 325 TABLET ORAL at 20:39

## 2019-10-03 RX ADMIN — DOCUSATE SODIUM 100 MG: 100 CAPSULE, LIQUID FILLED ORAL at 10:18

## 2019-10-03 RX ADMIN — MOMETASONE FUROATE AND FORMOTEROL FUMARATE DIHYDRATE 1 PUFF: 100; 5 AEROSOL RESPIRATORY (INHALATION) at 20:46

## 2019-10-03 RX ADMIN — DOCUSATE SODIUM 100 MG: 100 CAPSULE, LIQUID FILLED ORAL at 20:39

## 2019-10-03 RX ADMIN — CAPTOPRIL 25 MG: 25 TABLET ORAL at 10:18

## 2019-10-03 RX ADMIN — HEPARIN SODIUM 5000 UNITS: 5000 INJECTION INTRAVENOUS; SUBCUTANEOUS at 22:58

## 2019-10-03 RX ADMIN — CEFOXITIN SODIUM 2 G: 2 POWDER, FOR SOLUTION INTRAVENOUS at 12:25

## 2019-10-03 RX ADMIN — ACETAMINOPHEN 650 MG: 325 TABLET ORAL at 10:18

## 2019-10-03 RX ADMIN — PANTOPRAZOLE SODIUM 40 MG: 40 TABLET, DELAYED RELEASE ORAL at 06:03

## 2019-10-03 RX ADMIN — ACETAMINOPHEN 650 MG: 325 TABLET ORAL at 18:30

## 2019-10-03 RX ADMIN — MORPHINE SULFATE 30 MG: 15 TABLET, EXTENDED RELEASE ORAL at 22:58

## 2019-10-03 RX ADMIN — RANOLAZINE 500 MG: 500 TABLET, FILM COATED, EXTENDED RELEASE ORAL at 10:18

## 2019-10-03 RX ADMIN — ALLOPURINOL 100 MG: 100 TABLET ORAL at 20:39

## 2019-10-03 RX ADMIN — MORPHINE SULFATE 30 MG: 15 TABLET, EXTENDED RELEASE ORAL at 10:12

## 2019-10-03 RX ADMIN — DESMOPRESSIN ACETATE 40 MG: 0.2 TABLET ORAL at 20:39

## 2019-10-03 RX ADMIN — OXYCODONE HYDROCHLORIDE 10 MG: 5 TABLET ORAL at 19:27

## 2019-10-03 RX ADMIN — ACETAMINOPHEN 650 MG: 325 TABLET ORAL at 02:32

## 2019-10-03 RX ADMIN — MOMETASONE FUROATE AND FORMOTEROL FUMARATE DIHYDRATE 1 PUFF: 100; 5 AEROSOL RESPIRATORY (INHALATION) at 08:53

## 2019-10-03 RX ADMIN — MAGNESIUM GLUCONATE 500 MG ORAL TABLET 400 MG: 500 TABLET ORAL at 10:18

## 2019-10-03 RX ADMIN — FUROSEMIDE 80 MG: 40 TABLET ORAL at 10:18

## 2019-10-03 RX ADMIN — HEPARIN SODIUM 5000 UNITS: 5000 INJECTION INTRAVENOUS; SUBCUTANEOUS at 14:54

## 2019-10-03 RX ADMIN — CEFOXITIN SODIUM 2 G: 2 POWDER, FOR SOLUTION INTRAVENOUS at 04:21

## 2019-10-03 RX ADMIN — OXYCODONE HYDROCHLORIDE 10 MG: 5 TABLET ORAL at 14:51

## 2019-10-03 RX ADMIN — MULTIPLE VITAMINS W/ MINERALS TAB 1 TABLET: TAB at 10:18

## 2019-10-03 RX ADMIN — HEPARIN SODIUM 5000 UNITS: 5000 INJECTION INTRAVENOUS; SUBCUTANEOUS at 06:03

## 2019-10-03 RX ADMIN — OXYCODONE HYDROCHLORIDE 10 MG: 5 TABLET ORAL at 10:12

## 2019-10-03 ASSESSMENT — PAIN SCALES - GENERAL
PAINLEVEL_OUTOF10: 5
PAINLEVEL_OUTOF10: 8
PAINLEVEL_OUTOF10: 5
PAINLEVEL_OUTOF10: 5
PAINLEVEL_OUTOF10: 8
PAINLEVEL_OUTOF10: 5

## 2019-10-04 LAB
ANION GAP SERPL CALCULATED.3IONS-SCNC: 10 MMOL/L (ref 9–17)
BUN BLDV-MCNC: 20 MG/DL (ref 8–23)
BUN/CREAT BLD: ABNORMAL (ref 9–20)
CALCIUM SERPL-MCNC: 8.3 MG/DL (ref 8.6–10.4)
CHLORIDE BLD-SCNC: 108 MMOL/L (ref 98–107)
CO2: 21 MMOL/L (ref 20–31)
CREAT SERPL-MCNC: 1.44 MG/DL (ref 0.7–1.2)
GFR AFRICAN AMERICAN: 58 ML/MIN
GFR NON-AFRICAN AMERICAN: 48 ML/MIN
GFR SERPL CREATININE-BSD FRML MDRD: ABNORMAL ML/MIN/{1.73_M2}
GFR SERPL CREATININE-BSD FRML MDRD: ABNORMAL ML/MIN/{1.73_M2}
GLUCOSE BLD-MCNC: 105 MG/DL (ref 70–99)
GLUCOSE BLD-MCNC: 107 MG/DL (ref 75–110)
GLUCOSE BLD-MCNC: 115 MG/DL (ref 75–110)
GLUCOSE BLD-MCNC: 149 MG/DL (ref 75–110)
GLUCOSE BLD-MCNC: 168 MG/DL (ref 75–110)
HCT VFR BLD CALC: 28.1 % (ref 40.7–50.3)
HEMOGLOBIN: 8.7 G/DL (ref 13–17)
MCH RBC QN AUTO: 35.2 PG (ref 25.2–33.5)
MCHC RBC AUTO-ENTMCNC: 31 G/DL (ref 28.4–34.8)
MCV RBC AUTO: 113.8 FL (ref 82.6–102.9)
NRBC AUTOMATED: 0 PER 100 WBC
PDW BLD-RTO: 17.8 % (ref 11.8–14.4)
PLATELET # BLD: 103 K/UL (ref 138–453)
PMV BLD AUTO: 10.6 FL (ref 8.1–13.5)
POTASSIUM SERPL-SCNC: 4.2 MMOL/L (ref 3.7–5.3)
RBC # BLD: 2.47 M/UL (ref 4.21–5.77)
SODIUM BLD-SCNC: 139 MMOL/L (ref 135–144)
SURGICAL PATHOLOGY REPORT: NORMAL
WBC # BLD: 7.5 K/UL (ref 3.5–11.3)

## 2019-10-04 PROCEDURE — 6360000002 HC RX W HCPCS: Performed by: STUDENT IN AN ORGANIZED HEALTH CARE EDUCATION/TRAINING PROGRAM

## 2019-10-04 PROCEDURE — 6370000000 HC RX 637 (ALT 250 FOR IP): Performed by: STUDENT IN AN ORGANIZED HEALTH CARE EDUCATION/TRAINING PROGRAM

## 2019-10-04 PROCEDURE — 1200000000 HC SEMI PRIVATE

## 2019-10-04 PROCEDURE — 82947 ASSAY GLUCOSE BLOOD QUANT: CPT

## 2019-10-04 PROCEDURE — 94640 AIRWAY INHALATION TREATMENT: CPT

## 2019-10-04 PROCEDURE — 85027 COMPLETE CBC AUTOMATED: CPT

## 2019-10-04 PROCEDURE — 2580000003 HC RX 258: Performed by: STUDENT IN AN ORGANIZED HEALTH CARE EDUCATION/TRAINING PROGRAM

## 2019-10-04 PROCEDURE — 99211 OFF/OP EST MAY X REQ PHY/QHP: CPT

## 2019-10-04 PROCEDURE — 94761 N-INVAS EAR/PLS OXIMETRY MLT: CPT

## 2019-10-04 PROCEDURE — 80048 BASIC METABOLIC PNL TOTAL CA: CPT

## 2019-10-04 PROCEDURE — 2500000003 HC RX 250 WO HCPCS: Performed by: ANESTHESIOLOGY

## 2019-10-04 PROCEDURE — 36415 COLL VENOUS BLD VENIPUNCTURE: CPT

## 2019-10-04 PROCEDURE — 2580000003 HC RX 258: Performed by: ANESTHESIOLOGY

## 2019-10-04 RX ORDER — POLYETHYLENE GLYCOL 3350 17 G/17G
17 POWDER, FOR SOLUTION ORAL DAILY
Qty: 527 G | Refills: 1 | Status: SHIPPED | OUTPATIENT
Start: 2019-10-04 | End: 2019-11-03

## 2019-10-04 RX ORDER — SULFAMETHOXAZOLE AND TRIMETHOPRIM 800; 160 MG/1; MG/1
1 TABLET ORAL 2 TIMES DAILY
Qty: 28 TABLET | Refills: 0 | Status: SHIPPED | OUTPATIENT
Start: 2019-10-04 | End: 2019-10-18

## 2019-10-04 RX ORDER — OXYCODONE HYDROCHLORIDE 5 MG/1
5 TABLET ORAL EVERY 6 HOURS PRN
Qty: 20 TABLET | Refills: 0 | Status: SHIPPED | OUTPATIENT
Start: 2019-10-04 | End: 2019-10-07

## 2019-10-04 RX ADMIN — RANOLAZINE 500 MG: 500 TABLET, FILM COATED, EXTENDED RELEASE ORAL at 08:53

## 2019-10-04 RX ADMIN — Medication 10 ML: at 21:55

## 2019-10-04 RX ADMIN — ACETAMINOPHEN 650 MG: 325 TABLET ORAL at 16:26

## 2019-10-04 RX ADMIN — ATENOLOL 25 MG: 25 TABLET ORAL at 08:53

## 2019-10-04 RX ADMIN — ISOSORBIDE MONONITRATE 60 MG: 60 TABLET ORAL at 08:53

## 2019-10-04 RX ADMIN — CAPTOPRIL 25 MG: 25 TABLET ORAL at 21:53

## 2019-10-04 RX ADMIN — Medication 500 ML: at 10:00

## 2019-10-04 RX ADMIN — MORPHINE SULFATE 30 MG: 15 TABLET, EXTENDED RELEASE ORAL at 23:24

## 2019-10-04 RX ADMIN — MOMETASONE FUROATE AND FORMOTEROL FUMARATE DIHYDRATE 1 PUFF: 100; 5 AEROSOL RESPIRATORY (INHALATION) at 09:00

## 2019-10-04 RX ADMIN — DESMOPRESSIN ACETATE 40 MG: 0.2 TABLET ORAL at 21:54

## 2019-10-04 RX ADMIN — MORPHINE SULFATE 30 MG: 15 TABLET, EXTENDED RELEASE ORAL at 17:39

## 2019-10-04 RX ADMIN — Medication 10 ML: at 08:53

## 2019-10-04 RX ADMIN — MAGNESIUM GLUCONATE 500 MG ORAL TABLET 400 MG: 500 TABLET ORAL at 08:53

## 2019-10-04 RX ADMIN — PANTOPRAZOLE SODIUM 40 MG: 40 TABLET, DELAYED RELEASE ORAL at 06:40

## 2019-10-04 RX ADMIN — INSULIN LISPRO 1 UNITS: 100 INJECTION, SOLUTION INTRAVENOUS; SUBCUTANEOUS at 21:53

## 2019-10-04 RX ADMIN — OXYCODONE HYDROCHLORIDE 10 MG: 5 TABLET ORAL at 07:28

## 2019-10-04 RX ADMIN — ACETAMINOPHEN 650 MG: 325 TABLET ORAL at 01:56

## 2019-10-04 RX ADMIN — OXYCODONE HYDROCHLORIDE 10 MG: 5 TABLET ORAL at 16:27

## 2019-10-04 RX ADMIN — ACETAMINOPHEN 650 MG: 325 TABLET ORAL at 08:53

## 2019-10-04 RX ADMIN — SODIUM CHLORIDE: 9 INJECTION, SOLUTION INTRAVENOUS at 19:51

## 2019-10-04 RX ADMIN — RANOLAZINE 500 MG: 500 TABLET, FILM COATED, EXTENDED RELEASE ORAL at 21:53

## 2019-10-04 RX ADMIN — OXYCODONE HYDROCHLORIDE 10 MG: 5 TABLET ORAL at 12:08

## 2019-10-04 RX ADMIN — MULTIPLE VITAMINS W/ MINERALS TAB 1 TABLET: TAB at 08:53

## 2019-10-04 RX ADMIN — CAPTOPRIL 25 MG: 25 TABLET ORAL at 08:53

## 2019-10-04 RX ADMIN — ALLOPURINOL 100 MG: 100 TABLET ORAL at 21:54

## 2019-10-04 RX ADMIN — HEPARIN SODIUM 5000 UNITS: 5000 INJECTION INTRAVENOUS; SUBCUTANEOUS at 16:27

## 2019-10-04 RX ADMIN — FUROSEMIDE 80 MG: 40 TABLET ORAL at 08:53

## 2019-10-04 RX ADMIN — MORPHINE SULFATE 30 MG: 15 TABLET, EXTENDED RELEASE ORAL at 08:58

## 2019-10-04 RX ADMIN — ACETAMINOPHEN 650 MG: 325 TABLET ORAL at 21:54

## 2019-10-04 RX ADMIN — OXYCODONE HYDROCHLORIDE 10 MG: 5 TABLET ORAL at 21:53

## 2019-10-04 RX ADMIN — HEPARIN SODIUM 5000 UNITS: 5000 INJECTION INTRAVENOUS; SUBCUTANEOUS at 06:40

## 2019-10-04 ASSESSMENT — PAIN SCALES - GENERAL
PAINLEVEL_OUTOF10: 8
PAINLEVEL_OUTOF10: 5
PAINLEVEL_OUTOF10: 7
PAINLEVEL_OUTOF10: 10
PAINLEVEL_OUTOF10: 6
PAINLEVEL_OUTOF10: 7

## 2019-10-05 LAB
ANION GAP SERPL CALCULATED.3IONS-SCNC: 13 MMOL/L (ref 9–17)
BUN BLDV-MCNC: 16 MG/DL (ref 8–23)
BUN/CREAT BLD: ABNORMAL (ref 9–20)
CALCIUM SERPL-MCNC: 8.1 MG/DL (ref 8.6–10.4)
CHLORIDE BLD-SCNC: 108 MMOL/L (ref 98–107)
CO2: 19 MMOL/L (ref 20–31)
CREAT SERPL-MCNC: 1.18 MG/DL (ref 0.7–1.2)
CREATININE FLUID: 1.3 MG/DL
GFR AFRICAN AMERICAN: >60 ML/MIN
GFR NON-AFRICAN AMERICAN: >60 ML/MIN
GFR SERPL CREATININE-BSD FRML MDRD: ABNORMAL ML/MIN/{1.73_M2}
GFR SERPL CREATININE-BSD FRML MDRD: ABNORMAL ML/MIN/{1.73_M2}
GLUCOSE BLD-MCNC: 101 MG/DL (ref 75–110)
GLUCOSE BLD-MCNC: 113 MG/DL (ref 75–110)
GLUCOSE BLD-MCNC: 116 MG/DL (ref 70–99)
GLUCOSE BLD-MCNC: 116 MG/DL (ref 75–110)
GLUCOSE BLD-MCNC: 158 MG/DL (ref 75–110)
HCT VFR BLD CALC: 24.6 % (ref 40.7–50.3)
HEMOGLOBIN: 7.7 G/DL (ref 13–17)
MCH RBC QN AUTO: 34.8 PG (ref 25.2–33.5)
MCHC RBC AUTO-ENTMCNC: 31.3 G/DL (ref 28.4–34.8)
MCV RBC AUTO: 111.3 FL (ref 82.6–102.9)
NRBC AUTOMATED: 0 PER 100 WBC
PDW BLD-RTO: 17.4 % (ref 11.8–14.4)
PLATELET # BLD: 113 K/UL (ref 138–453)
PMV BLD AUTO: 10.3 FL (ref 8.1–13.5)
POTASSIUM SERPL-SCNC: 4 MMOL/L (ref 3.7–5.3)
RBC # BLD: 2.21 M/UL (ref 4.21–5.77)
SODIUM BLD-SCNC: 140 MMOL/L (ref 135–144)
SPECIMEN TYPE: NORMAL
WBC # BLD: 7.6 K/UL (ref 3.5–11.3)

## 2019-10-05 PROCEDURE — 2580000003 HC RX 258: Performed by: STUDENT IN AN ORGANIZED HEALTH CARE EDUCATION/TRAINING PROGRAM

## 2019-10-05 PROCEDURE — 82570 ASSAY OF URINE CREATININE: CPT

## 2019-10-05 PROCEDURE — 80048 BASIC METABOLIC PNL TOTAL CA: CPT

## 2019-10-05 PROCEDURE — 94640 AIRWAY INHALATION TREATMENT: CPT

## 2019-10-05 PROCEDURE — 82947 ASSAY GLUCOSE BLOOD QUANT: CPT

## 2019-10-05 PROCEDURE — 6370000000 HC RX 637 (ALT 250 FOR IP): Performed by: STUDENT IN AN ORGANIZED HEALTH CARE EDUCATION/TRAINING PROGRAM

## 2019-10-05 PROCEDURE — 6360000002 HC RX W HCPCS: Performed by: STUDENT IN AN ORGANIZED HEALTH CARE EDUCATION/TRAINING PROGRAM

## 2019-10-05 PROCEDURE — 85027 COMPLETE CBC AUTOMATED: CPT

## 2019-10-05 PROCEDURE — 36415 COLL VENOUS BLD VENIPUNCTURE: CPT

## 2019-10-05 PROCEDURE — 1200000000 HC SEMI PRIVATE

## 2019-10-05 RX ADMIN — RANOLAZINE 500 MG: 500 TABLET, FILM COATED, EXTENDED RELEASE ORAL at 22:42

## 2019-10-05 RX ADMIN — ACETAMINOPHEN 650 MG: 325 TABLET ORAL at 22:42

## 2019-10-05 RX ADMIN — OXYCODONE HYDROCHLORIDE 10 MG: 5 TABLET ORAL at 18:36

## 2019-10-05 RX ADMIN — OXYCODONE HYDROCHLORIDE 10 MG: 5 TABLET ORAL at 14:27

## 2019-10-05 RX ADMIN — PANTOPRAZOLE SODIUM 40 MG: 40 TABLET, DELAYED RELEASE ORAL at 05:56

## 2019-10-05 RX ADMIN — MORPHINE SULFATE 30 MG: 15 TABLET, EXTENDED RELEASE ORAL at 09:57

## 2019-10-05 RX ADMIN — HEPARIN SODIUM 5000 UNITS: 5000 INJECTION INTRAVENOUS; SUBCUTANEOUS at 22:44

## 2019-10-05 RX ADMIN — CAPTOPRIL 25 MG: 25 TABLET ORAL at 22:43

## 2019-10-05 RX ADMIN — ISOSORBIDE MONONITRATE 60 MG: 60 TABLET ORAL at 09:23

## 2019-10-05 RX ADMIN — ATENOLOL 25 MG: 25 TABLET ORAL at 09:24

## 2019-10-05 RX ADMIN — ACETAMINOPHEN 650 MG: 325 TABLET ORAL at 02:11

## 2019-10-05 RX ADMIN — INSULIN LISPRO 1 UNITS: 100 INJECTION, SOLUTION INTRAVENOUS; SUBCUTANEOUS at 11:42

## 2019-10-05 RX ADMIN — DOCUSATE SODIUM 100 MG: 100 CAPSULE, LIQUID FILLED ORAL at 22:42

## 2019-10-05 RX ADMIN — MOMETASONE FUROATE AND FORMOTEROL FUMARATE DIHYDRATE 1 PUFF: 100; 5 AEROSOL RESPIRATORY (INHALATION) at 20:47

## 2019-10-05 RX ADMIN — MULTIPLE VITAMINS W/ MINERALS TAB 1 TABLET: TAB at 09:23

## 2019-10-05 RX ADMIN — Medication 10 ML: at 22:44

## 2019-10-05 RX ADMIN — CAPTOPRIL 25 MG: 25 TABLET ORAL at 09:23

## 2019-10-05 RX ADMIN — DESMOPRESSIN ACETATE 40 MG: 0.2 TABLET ORAL at 22:42

## 2019-10-05 RX ADMIN — ACETAMINOPHEN 650 MG: 325 TABLET ORAL at 09:24

## 2019-10-05 RX ADMIN — FUROSEMIDE 80 MG: 40 TABLET ORAL at 09:23

## 2019-10-05 RX ADMIN — MORPHINE SULFATE 30 MG: 15 TABLET, EXTENDED RELEASE ORAL at 23:52

## 2019-10-05 RX ADMIN — OXYCODONE HYDROCHLORIDE 10 MG: 5 TABLET ORAL at 09:26

## 2019-10-05 RX ADMIN — SODIUM CHLORIDE: 9 INJECTION, SOLUTION INTRAVENOUS at 11:42

## 2019-10-05 RX ADMIN — ACETAMINOPHEN 650 MG: 325 TABLET ORAL at 14:25

## 2019-10-05 RX ADMIN — MORPHINE SULFATE 30 MG: 15 TABLET, EXTENDED RELEASE ORAL at 16:18

## 2019-10-05 RX ADMIN — ALLOPURINOL 100 MG: 100 TABLET ORAL at 22:42

## 2019-10-05 RX ADMIN — MAGNESIUM GLUCONATE 500 MG ORAL TABLET 400 MG: 500 TABLET ORAL at 09:23

## 2019-10-05 RX ADMIN — OXYCODONE HYDROCHLORIDE 10 MG: 5 TABLET ORAL at 22:43

## 2019-10-05 RX ADMIN — RANOLAZINE 500 MG: 500 TABLET, FILM COATED, EXTENDED RELEASE ORAL at 09:23

## 2019-10-05 RX ADMIN — Medication 10 ML: at 09:27

## 2019-10-05 ASSESSMENT — PAIN SCALES - GENERAL
PAINLEVEL_OUTOF10: 5
PAINLEVEL_OUTOF10: 8
PAINLEVEL_OUTOF10: 3
PAINLEVEL_OUTOF10: 8
PAINLEVEL_OUTOF10: 7
PAINLEVEL_OUTOF10: 5
PAINLEVEL_OUTOF10: 8
PAINLEVEL_OUTOF10: 5
PAINLEVEL_OUTOF10: 7
PAINLEVEL_OUTOF10: 7
PAINLEVEL_OUTOF10: 5
PAINLEVEL_OUTOF10: 8

## 2019-10-06 LAB
ANION GAP SERPL CALCULATED.3IONS-SCNC: 13 MMOL/L (ref 9–17)
BUN BLDV-MCNC: 16 MG/DL (ref 8–23)
BUN/CREAT BLD: ABNORMAL (ref 9–20)
CALCIUM SERPL-MCNC: 8.7 MG/DL (ref 8.6–10.4)
CHLORIDE BLD-SCNC: 108 MMOL/L (ref 98–107)
CO2: 19 MMOL/L (ref 20–31)
CREAT SERPL-MCNC: 1.25 MG/DL (ref 0.7–1.2)
GFR AFRICAN AMERICAN: >60 ML/MIN
GFR NON-AFRICAN AMERICAN: 56 ML/MIN
GFR SERPL CREATININE-BSD FRML MDRD: ABNORMAL ML/MIN/{1.73_M2}
GFR SERPL CREATININE-BSD FRML MDRD: ABNORMAL ML/MIN/{1.73_M2}
GLUCOSE BLD-MCNC: 123 MG/DL (ref 75–110)
GLUCOSE BLD-MCNC: 126 MG/DL (ref 70–99)
GLUCOSE BLD-MCNC: 130 MG/DL (ref 75–110)
GLUCOSE BLD-MCNC: 134 MG/DL (ref 75–110)
GLUCOSE BLD-MCNC: 155 MG/DL (ref 75–110)
HCT VFR BLD CALC: 25.7 % (ref 40.7–50.3)
HEMOGLOBIN: 8.2 G/DL (ref 13–17)
MAGNESIUM: 1.8 MG/DL (ref 1.6–2.6)
MCH RBC QN AUTO: 34.7 PG (ref 25.2–33.5)
MCHC RBC AUTO-ENTMCNC: 31.9 G/DL (ref 28.4–34.8)
MCV RBC AUTO: 108.9 FL (ref 82.6–102.9)
NRBC AUTOMATED: 0 PER 100 WBC
PDW BLD-RTO: 17.6 % (ref 11.8–14.4)
PHOSPHORUS: 3 MG/DL (ref 2.5–4.5)
PLATELET # BLD: 129 K/UL (ref 138–453)
PMV BLD AUTO: 10.4 FL (ref 8.1–13.5)
POTASSIUM SERPL-SCNC: 4 MMOL/L (ref 3.7–5.3)
RBC # BLD: 2.36 M/UL (ref 4.21–5.77)
SODIUM BLD-SCNC: 140 MMOL/L (ref 135–144)
WBC # BLD: 7.6 K/UL (ref 3.5–11.3)

## 2019-10-06 PROCEDURE — 36415 COLL VENOUS BLD VENIPUNCTURE: CPT

## 2019-10-06 PROCEDURE — 80048 BASIC METABOLIC PNL TOTAL CA: CPT

## 2019-10-06 PROCEDURE — 82947 ASSAY GLUCOSE BLOOD QUANT: CPT

## 2019-10-06 PROCEDURE — 85027 COMPLETE CBC AUTOMATED: CPT

## 2019-10-06 PROCEDURE — 84100 ASSAY OF PHOSPHORUS: CPT

## 2019-10-06 PROCEDURE — 1200000000 HC SEMI PRIVATE

## 2019-10-06 PROCEDURE — 2580000003 HC RX 258: Performed by: STUDENT IN AN ORGANIZED HEALTH CARE EDUCATION/TRAINING PROGRAM

## 2019-10-06 PROCEDURE — 6370000000 HC RX 637 (ALT 250 FOR IP): Performed by: STUDENT IN AN ORGANIZED HEALTH CARE EDUCATION/TRAINING PROGRAM

## 2019-10-06 PROCEDURE — 83735 ASSAY OF MAGNESIUM: CPT

## 2019-10-06 PROCEDURE — 6360000002 HC RX W HCPCS: Performed by: STUDENT IN AN ORGANIZED HEALTH CARE EDUCATION/TRAINING PROGRAM

## 2019-10-06 PROCEDURE — 94640 AIRWAY INHALATION TREATMENT: CPT

## 2019-10-06 RX ADMIN — SODIUM CHLORIDE: 9 INJECTION, SOLUTION INTRAVENOUS at 06:54

## 2019-10-06 RX ADMIN — OXYCODONE HYDROCHLORIDE 10 MG: 5 TABLET ORAL at 08:10

## 2019-10-06 RX ADMIN — FUROSEMIDE 80 MG: 40 TABLET ORAL at 08:10

## 2019-10-06 RX ADMIN — HEPARIN SODIUM 5000 UNITS: 5000 INJECTION INTRAVENOUS; SUBCUTANEOUS at 21:40

## 2019-10-06 RX ADMIN — DOCUSATE SODIUM 100 MG: 100 CAPSULE, LIQUID FILLED ORAL at 21:33

## 2019-10-06 RX ADMIN — Medication 10 ML: at 08:09

## 2019-10-06 RX ADMIN — MORPHINE SULFATE 30 MG: 15 TABLET, EXTENDED RELEASE ORAL at 16:19

## 2019-10-06 RX ADMIN — HEPARIN SODIUM 5000 UNITS: 5000 INJECTION INTRAVENOUS; SUBCUTANEOUS at 06:54

## 2019-10-06 RX ADMIN — MULTIPLE VITAMINS W/ MINERALS TAB 1 TABLET: TAB at 08:10

## 2019-10-06 RX ADMIN — OXYCODONE HYDROCHLORIDE 10 MG: 5 TABLET ORAL at 12:11

## 2019-10-06 RX ADMIN — OXYCODONE HYDROCHLORIDE 10 MG: 5 TABLET ORAL at 16:20

## 2019-10-06 RX ADMIN — OXYCODONE HYDROCHLORIDE 10 MG: 5 TABLET ORAL at 02:41

## 2019-10-06 RX ADMIN — ACETAMINOPHEN 650 MG: 325 TABLET ORAL at 02:39

## 2019-10-06 RX ADMIN — DESMOPRESSIN ACETATE 40 MG: 0.2 TABLET ORAL at 21:32

## 2019-10-06 RX ADMIN — ISOSORBIDE MONONITRATE 60 MG: 60 TABLET ORAL at 08:10

## 2019-10-06 RX ADMIN — ACETAMINOPHEN 650 MG: 325 TABLET ORAL at 21:33

## 2019-10-06 RX ADMIN — MAGNESIUM GLUCONATE 500 MG ORAL TABLET 400 MG: 500 TABLET ORAL at 08:10

## 2019-10-06 RX ADMIN — CAPTOPRIL 25 MG: 25 TABLET ORAL at 21:32

## 2019-10-06 RX ADMIN — ACETAMINOPHEN 650 MG: 325 TABLET ORAL at 08:10

## 2019-10-06 RX ADMIN — DOCUSATE SODIUM 100 MG: 100 CAPSULE, LIQUID FILLED ORAL at 08:10

## 2019-10-06 RX ADMIN — MORPHINE SULFATE 30 MG: 15 TABLET, EXTENDED RELEASE ORAL at 08:09

## 2019-10-06 RX ADMIN — RANOLAZINE 500 MG: 500 TABLET, FILM COATED, EXTENDED RELEASE ORAL at 08:10

## 2019-10-06 RX ADMIN — ATENOLOL 25 MG: 25 TABLET ORAL at 08:10

## 2019-10-06 RX ADMIN — SODIUM CHLORIDE: 9 INJECTION, SOLUTION INTRAVENOUS at 23:44

## 2019-10-06 RX ADMIN — PANTOPRAZOLE SODIUM 40 MG: 40 TABLET, DELAYED RELEASE ORAL at 06:54

## 2019-10-06 RX ADMIN — OXYCODONE HYDROCHLORIDE 10 MG: 5 TABLET ORAL at 21:32

## 2019-10-06 RX ADMIN — MORPHINE SULFATE 30 MG: 15 TABLET, EXTENDED RELEASE ORAL at 23:44

## 2019-10-06 RX ADMIN — MOMETASONE FUROATE AND FORMOTEROL FUMARATE DIHYDRATE 1 PUFF: 100; 5 AEROSOL RESPIRATORY (INHALATION) at 11:31

## 2019-10-06 RX ADMIN — RANOLAZINE 500 MG: 500 TABLET, FILM COATED, EXTENDED RELEASE ORAL at 21:32

## 2019-10-06 RX ADMIN — CAPTOPRIL 25 MG: 25 TABLET ORAL at 08:10

## 2019-10-06 RX ADMIN — HEPARIN SODIUM 5000 UNITS: 5000 INJECTION INTRAVENOUS; SUBCUTANEOUS at 13:53

## 2019-10-06 RX ADMIN — MOMETASONE FUROATE AND FORMOTEROL FUMARATE DIHYDRATE 1 PUFF: 100; 5 AEROSOL RESPIRATORY (INHALATION) at 20:28

## 2019-10-06 RX ADMIN — ALLOPURINOL 100 MG: 100 TABLET ORAL at 22:34

## 2019-10-06 RX ADMIN — INSULIN LISPRO 1 UNITS: 100 INJECTION, SOLUTION INTRAVENOUS; SUBCUTANEOUS at 12:11

## 2019-10-06 RX ADMIN — ACETAMINOPHEN 650 MG: 325 TABLET ORAL at 13:53

## 2019-10-06 ASSESSMENT — PAIN SCALES - GENERAL
PAINLEVEL_OUTOF10: 4
PAINLEVEL_OUTOF10: 2
PAINLEVEL_OUTOF10: 7
PAINLEVEL_OUTOF10: 4
PAINLEVEL_OUTOF10: 7
PAINLEVEL_OUTOF10: 7
PAINLEVEL_OUTOF10: 8
PAINLEVEL_OUTOF10: 4
PAINLEVEL_OUTOF10: 8
PAINLEVEL_OUTOF10: 7
PAINLEVEL_OUTOF10: 8
PAINLEVEL_OUTOF10: 4
PAINLEVEL_OUTOF10: 5

## 2019-10-07 VITALS
SYSTOLIC BLOOD PRESSURE: 112 MMHG | TEMPERATURE: 97.9 F | RESPIRATION RATE: 20 BRPM | DIASTOLIC BLOOD PRESSURE: 65 MMHG | BODY MASS INDEX: 35.06 KG/M2 | WEIGHT: 250.44 LBS | HEIGHT: 71 IN | HEART RATE: 64 BPM | OXYGEN SATURATION: 98 %

## 2019-10-07 LAB
ANION GAP SERPL CALCULATED.3IONS-SCNC: 12 MMOL/L (ref 9–17)
BUN BLDV-MCNC: 17 MG/DL (ref 8–23)
BUN/CREAT BLD: ABNORMAL (ref 9–20)
CALCIUM SERPL-MCNC: 8.6 MG/DL (ref 8.6–10.4)
CHLORIDE BLD-SCNC: 109 MMOL/L (ref 98–107)
CO2: 20 MMOL/L (ref 20–31)
CREAT SERPL-MCNC: 1.16 MG/DL (ref 0.7–1.2)
GFR AFRICAN AMERICAN: >60 ML/MIN
GFR NON-AFRICAN AMERICAN: >60 ML/MIN
GFR SERPL CREATININE-BSD FRML MDRD: ABNORMAL ML/MIN/{1.73_M2}
GFR SERPL CREATININE-BSD FRML MDRD: ABNORMAL ML/MIN/{1.73_M2}
GLUCOSE BLD-MCNC: 124 MG/DL (ref 75–110)
GLUCOSE BLD-MCNC: 126 MG/DL (ref 70–99)
GLUCOSE BLD-MCNC: 132 MG/DL (ref 75–110)
GLUCOSE BLD-MCNC: 142 MG/DL (ref 75–110)
HCT VFR BLD CALC: 24.4 % (ref 40.7–50.3)
HEMOGLOBIN: 7.7 G/DL (ref 13–17)
MAGNESIUM: 1.9 MG/DL (ref 1.6–2.6)
MCH RBC QN AUTO: 34.4 PG (ref 25.2–33.5)
MCHC RBC AUTO-ENTMCNC: 31.6 G/DL (ref 28.4–34.8)
MCV RBC AUTO: 108.9 FL (ref 82.6–102.9)
NRBC AUTOMATED: 0 PER 100 WBC
PDW BLD-RTO: 17.5 % (ref 11.8–14.4)
PHOSPHORUS: 3.5 MG/DL (ref 2.5–4.5)
PLATELET # BLD: 133 K/UL (ref 138–453)
PMV BLD AUTO: 10.7 FL (ref 8.1–13.5)
POTASSIUM SERPL-SCNC: 4 MMOL/L (ref 3.7–5.3)
RBC # BLD: 2.24 M/UL (ref 4.21–5.77)
SODIUM BLD-SCNC: 141 MMOL/L (ref 135–144)
WBC # BLD: 5.4 K/UL (ref 3.5–11.3)

## 2019-10-07 PROCEDURE — 94640 AIRWAY INHALATION TREATMENT: CPT

## 2019-10-07 PROCEDURE — 80048 BASIC METABOLIC PNL TOTAL CA: CPT

## 2019-10-07 PROCEDURE — 6370000000 HC RX 637 (ALT 250 FOR IP): Performed by: STUDENT IN AN ORGANIZED HEALTH CARE EDUCATION/TRAINING PROGRAM

## 2019-10-07 PROCEDURE — 83735 ASSAY OF MAGNESIUM: CPT

## 2019-10-07 PROCEDURE — 36415 COLL VENOUS BLD VENIPUNCTURE: CPT

## 2019-10-07 PROCEDURE — 82947 ASSAY GLUCOSE BLOOD QUANT: CPT

## 2019-10-07 PROCEDURE — 84100 ASSAY OF PHOSPHORUS: CPT

## 2019-10-07 PROCEDURE — 85027 COMPLETE CBC AUTOMATED: CPT

## 2019-10-07 PROCEDURE — 6360000002 HC RX W HCPCS: Performed by: STUDENT IN AN ORGANIZED HEALTH CARE EDUCATION/TRAINING PROGRAM

## 2019-10-07 PROCEDURE — 94760 N-INVAS EAR/PLS OXIMETRY 1: CPT

## 2019-10-07 RX ADMIN — RANOLAZINE 500 MG: 500 TABLET, FILM COATED, EXTENDED RELEASE ORAL at 08:11

## 2019-10-07 RX ADMIN — ACETAMINOPHEN 650 MG: 325 TABLET ORAL at 14:27

## 2019-10-07 RX ADMIN — DOCUSATE SODIUM 100 MG: 100 CAPSULE, LIQUID FILLED ORAL at 08:11

## 2019-10-07 RX ADMIN — MORPHINE SULFATE 30 MG: 15 TABLET, EXTENDED RELEASE ORAL at 08:10

## 2019-10-07 RX ADMIN — HEPARIN SODIUM 5000 UNITS: 5000 INJECTION INTRAVENOUS; SUBCUTANEOUS at 06:03

## 2019-10-07 RX ADMIN — MAGNESIUM GLUCONATE 500 MG ORAL TABLET 400 MG: 500 TABLET ORAL at 08:11

## 2019-10-07 RX ADMIN — ACETAMINOPHEN 650 MG: 325 TABLET ORAL at 02:10

## 2019-10-07 RX ADMIN — ACETAMINOPHEN 650 MG: 325 TABLET ORAL at 08:11

## 2019-10-07 RX ADMIN — MULTIPLE VITAMINS W/ MINERALS TAB 1 TABLET: TAB at 08:11

## 2019-10-07 RX ADMIN — HEPARIN SODIUM 5000 UNITS: 5000 INJECTION INTRAVENOUS; SUBCUTANEOUS at 14:27

## 2019-10-07 RX ADMIN — OXYCODONE HYDROCHLORIDE 10 MG: 5 TABLET ORAL at 14:27

## 2019-10-07 RX ADMIN — CAPTOPRIL 25 MG: 25 TABLET ORAL at 08:10

## 2019-10-07 RX ADMIN — ATENOLOL 25 MG: 25 TABLET ORAL at 08:11

## 2019-10-07 RX ADMIN — PANTOPRAZOLE SODIUM 40 MG: 40 TABLET, DELAYED RELEASE ORAL at 06:02

## 2019-10-07 RX ADMIN — FUROSEMIDE 80 MG: 40 TABLET ORAL at 08:10

## 2019-10-07 RX ADMIN — ISOSORBIDE MONONITRATE 60 MG: 60 TABLET ORAL at 08:11

## 2019-10-07 RX ADMIN — OXYCODONE HYDROCHLORIDE 10 MG: 5 TABLET ORAL at 08:10

## 2019-10-07 RX ADMIN — MOMETASONE FUROATE AND FORMOTEROL FUMARATE DIHYDRATE 1 PUFF: 100; 5 AEROSOL RESPIRATORY (INHALATION) at 09:02

## 2019-10-07 ASSESSMENT — PAIN SCALES - GENERAL
PAINLEVEL_OUTOF10: 3
PAINLEVEL_OUTOF10: 8
PAINLEVEL_OUTOF10: 8
PAINLEVEL_OUTOF10: 5

## 2019-10-08 ENCOUNTER — CARE COORDINATION (OUTPATIENT)
Dept: CASE MANAGEMENT | Age: 74
End: 2019-10-08

## 2019-10-08 DIAGNOSIS — C67.9 MALIGNANT NEOPLASM OF URINARY BLADDER, UNSPECIFIED SITE (HCC): Primary | ICD-10-CM

## 2019-10-08 PROCEDURE — 1111F DSCHRG MED/CURRENT MED MERGE: CPT | Performed by: NURSE PRACTITIONER

## 2019-10-09 ENCOUNTER — TELEPHONE (OUTPATIENT)
Dept: UROLOGY | Age: 74
End: 2019-10-09

## 2019-10-10 ENCOUNTER — OFFICE VISIT (OUTPATIENT)
Dept: UROLOGY | Age: 74
End: 2019-10-10

## 2019-10-10 VITALS
TEMPERATURE: 97.9 F | WEIGHT: 224 LBS | BODY MASS INDEX: 31.36 KG/M2 | DIASTOLIC BLOOD PRESSURE: 63 MMHG | SYSTOLIC BLOOD PRESSURE: 109 MMHG | HEIGHT: 71 IN | HEART RATE: 70 BPM

## 2019-10-10 DIAGNOSIS — Z98.890 HISTORY OF ILEAL CONDUIT: ICD-10-CM

## 2019-10-10 DIAGNOSIS — C67.8 MALIGNANT NEOPLASM OF OVERLAPPING SITES OF BLADDER (HCC): Primary | ICD-10-CM

## 2019-10-10 DIAGNOSIS — K59.00 CONSTIPATION, UNSPECIFIED CONSTIPATION TYPE: ICD-10-CM

## 2019-10-10 PROCEDURE — 99024 POSTOP FOLLOW-UP VISIT: CPT | Performed by: UROLOGY

## 2019-10-10 ASSESSMENT — ENCOUNTER SYMPTOMS
CONSTIPATION: 1
COUGH: 0
EYE REDNESS: 0
ABDOMINAL PAIN: 0
SHORTNESS OF BREATH: 0
NAUSEA: 0
WHEEZING: 0
EYE PAIN: 0
VOMITING: 0
BACK PAIN: 1
DIARRHEA: 0

## 2019-10-11 ENCOUNTER — CARE COORDINATION (OUTPATIENT)
Dept: CASE MANAGEMENT | Age: 74
End: 2019-10-11

## 2019-10-15 ENCOUNTER — OFFICE VISIT (OUTPATIENT)
Dept: UROLOGY | Age: 74
End: 2019-10-15

## 2019-10-15 VITALS
SYSTOLIC BLOOD PRESSURE: 105 MMHG | BODY MASS INDEX: 33.26 KG/M2 | WEIGHT: 237.6 LBS | HEART RATE: 70 BPM | DIASTOLIC BLOOD PRESSURE: 62 MMHG | HEIGHT: 71 IN | TEMPERATURE: 97.9 F

## 2019-10-15 DIAGNOSIS — C67.9 MALIGNANT NEOPLASM OF URINARY BLADDER, UNSPECIFIED SITE (HCC): Primary | ICD-10-CM

## 2019-10-15 PROCEDURE — 99024 POSTOP FOLLOW-UP VISIT: CPT | Performed by: UROLOGY

## 2019-10-15 RX ORDER — DIPHENHYDRAMINE HCL 25 MG
50 TABLET ORAL ONCE
Qty: 2 TABLET | Refills: 0 | Status: SHIPPED | OUTPATIENT
Start: 2019-10-15 | End: 2019-10-15

## 2019-10-15 RX ORDER — PREDNISONE 50 MG/1
50 TABLET ORAL EVERY 6 HOURS
Qty: 3 TABLET | Refills: 0 | Status: SHIPPED | OUTPATIENT
Start: 2019-10-15 | End: 2019-10-16

## 2019-10-15 ASSESSMENT — ENCOUNTER SYMPTOMS
EYE REDNESS: 0
SHORTNESS OF BREATH: 0
VOMITING: 0
COUGH: 0
BACK PAIN: 1
EYE PAIN: 0
DIARRHEA: 0
WHEEZING: 0
NAUSEA: 0
ABDOMINAL PAIN: 0
CONSTIPATION: 0

## 2019-10-16 ENCOUNTER — CARE COORDINATION (OUTPATIENT)
Dept: CASE MANAGEMENT | Age: 74
End: 2019-10-16

## 2019-10-18 ENCOUNTER — CARE COORDINATION (OUTPATIENT)
Dept: CASE MANAGEMENT | Age: 74
End: 2019-10-18

## 2019-10-21 ENCOUNTER — HOSPITAL ENCOUNTER (OUTPATIENT)
Age: 74
Discharge: HOME | End: 2019-10-21
Payer: MEDICARE

## 2019-10-21 ENCOUNTER — CARE COORDINATION (OUTPATIENT)
Dept: CASE MANAGEMENT | Age: 74
End: 2019-10-21

## 2019-10-21 DIAGNOSIS — C67.9 MALIGNANT NEOPLASM OF URINARY BLADDER, UNSPECIFIED SITE (HCC): ICD-10-CM

## 2019-10-21 LAB
HCT VFR BLD CALC: 28.2 % (ref 40.7–50.3)
HEMOGLOBIN: 8.8 G/DL (ref 13–17)
MCH RBC QN AUTO: 35.3 PG (ref 25.2–33.5)
MCHC RBC AUTO-ENTMCNC: 31.2 G/DL (ref 28.4–34.8)
MCV RBC AUTO: 113.3 FL (ref 82.6–102.9)
NRBC AUTOMATED: 0 PER 100 WBC
PDW BLD-RTO: 17.5 % (ref 11.8–14.4)
PLATELET # BLD: 193 K/UL (ref 138–453)
PMV BLD AUTO: 10.4 FL (ref 8.1–13.5)
RBC # BLD: 2.49 M/UL (ref 4.21–5.77)
WBC # BLD: 6.4 K/UL (ref 3.5–11.3)

## 2019-10-21 PROCEDURE — 36415 COLL VENOUS BLD VENIPUNCTURE: CPT

## 2019-10-21 PROCEDURE — 85027 COMPLETE CBC AUTOMATED: CPT

## 2019-10-25 ENCOUNTER — TELEPHONE (OUTPATIENT)
Dept: ONCOLOGY | Age: 74
End: 2019-10-25

## 2019-10-25 ENCOUNTER — OFFICE VISIT (OUTPATIENT)
Dept: ONCOLOGY | Age: 74
End: 2019-10-25
Payer: MEDICARE

## 2019-10-25 VITALS
WEIGHT: 228.5 LBS | TEMPERATURE: 98 F | HEART RATE: 71 BPM | SYSTOLIC BLOOD PRESSURE: 110 MMHG | BODY MASS INDEX: 31.87 KG/M2 | DIASTOLIC BLOOD PRESSURE: 67 MMHG

## 2019-10-25 DIAGNOSIS — C67.9 MALIGNANT NEOPLASM OF URINARY BLADDER, UNSPECIFIED SITE (HCC): Primary | ICD-10-CM

## 2019-10-25 DIAGNOSIS — C61 PROSTATE CANCER (HCC): ICD-10-CM

## 2019-10-25 PROCEDURE — 99214 OFFICE O/P EST MOD 30 MIN: CPT | Performed by: INTERNAL MEDICINE

## 2019-10-25 PROCEDURE — G8598 ASA/ANTIPLAT THER USED: HCPCS | Performed by: INTERNAL MEDICINE

## 2019-10-25 PROCEDURE — G8427 DOCREV CUR MEDS BY ELIG CLIN: HCPCS | Performed by: INTERNAL MEDICINE

## 2019-10-25 PROCEDURE — 1123F ACP DISCUSS/DSCN MKR DOCD: CPT | Performed by: INTERNAL MEDICINE

## 2019-10-25 PROCEDURE — 1111F DSCHRG MED/CURRENT MED MERGE: CPT | Performed by: INTERNAL MEDICINE

## 2019-10-25 PROCEDURE — 4040F PNEUMOC VAC/ADMIN/RCVD: CPT | Performed by: INTERNAL MEDICINE

## 2019-10-25 PROCEDURE — 99211 OFF/OP EST MAY X REQ PHY/QHP: CPT | Performed by: INTERNAL MEDICINE

## 2019-10-25 PROCEDURE — G8482 FLU IMMUNIZE ORDER/ADMIN: HCPCS | Performed by: INTERNAL MEDICINE

## 2019-10-25 PROCEDURE — G8417 CALC BMI ABV UP PARAM F/U: HCPCS | Performed by: INTERNAL MEDICINE

## 2019-10-25 PROCEDURE — 3017F COLORECTAL CA SCREEN DOC REV: CPT | Performed by: INTERNAL MEDICINE

## 2019-10-25 PROCEDURE — 1036F TOBACCO NON-USER: CPT | Performed by: INTERNAL MEDICINE

## 2019-10-29 ENCOUNTER — OFFICE VISIT (OUTPATIENT)
Dept: UROLOGY | Age: 74
End: 2019-10-29

## 2019-10-29 VITALS
HEART RATE: 76 BPM | WEIGHT: 227.07 LBS | HEIGHT: 71 IN | SYSTOLIC BLOOD PRESSURE: 94 MMHG | DIASTOLIC BLOOD PRESSURE: 48 MMHG | TEMPERATURE: 98.3 F | BODY MASS INDEX: 31.79 KG/M2

## 2019-10-29 DIAGNOSIS — Z98.890 HISTORY OF ILEAL CONDUIT: Primary | ICD-10-CM

## 2019-10-29 DIAGNOSIS — C67.9 MALIGNANT NEOPLASM OF URINARY BLADDER, UNSPECIFIED SITE (HCC): ICD-10-CM

## 2019-10-29 PROCEDURE — 99024 POSTOP FOLLOW-UP VISIT: CPT | Performed by: NURSE PRACTITIONER

## 2019-10-29 ASSESSMENT — ENCOUNTER SYMPTOMS
BACK PAIN: 0
WHEEZING: 0
NAUSEA: 0
VOMITING: 0
COLOR CHANGE: 0
EYE REDNESS: 0
COUGH: 0
SHORTNESS OF BREATH: 0
ABDOMINAL PAIN: 0
EYE PAIN: 0

## 2019-10-30 ENCOUNTER — TELEPHONE (OUTPATIENT)
Dept: UROLOGY | Age: 74
End: 2019-10-30

## 2019-10-30 ENCOUNTER — APPOINTMENT (OUTPATIENT)
Dept: CT IMAGING | Age: 74
DRG: 698 | End: 2019-10-30
Payer: MEDICARE

## 2019-10-30 ENCOUNTER — HOSPITAL ENCOUNTER (INPATIENT)
Age: 74
LOS: 3 days | Discharge: HOME | DRG: 698 | End: 2019-11-02
Attending: EMERGENCY MEDICINE | Admitting: INTERNAL MEDICINE
Payer: MEDICARE

## 2019-10-30 DIAGNOSIS — N17.9 AKI (ACUTE KIDNEY INJURY) (HCC): ICD-10-CM

## 2019-10-30 DIAGNOSIS — E86.0 DEHYDRATION: ICD-10-CM

## 2019-10-30 DIAGNOSIS — R11.0 NAUSEA: Primary | ICD-10-CM

## 2019-10-30 DIAGNOSIS — N39.0 ACUTE UTI: ICD-10-CM

## 2019-10-30 LAB
-: ABNORMAL
ABSOLUTE EOS #: 0 K/UL (ref 0–0.4)
ABSOLUTE IMMATURE GRANULOCYTE: ABNORMAL K/UL (ref 0–0.3)
ABSOLUTE LYMPH #: 0.6 K/UL (ref 1–4.8)
ABSOLUTE MONO #: 0.5 K/UL (ref 0.1–1.2)
ALBUMIN SERPL-MCNC: 3.8 G/DL (ref 3.5–5.2)
ALBUMIN/GLOBULIN RATIO: 0.9 (ref 1–2.5)
ALP BLD-CCNC: 59 U/L (ref 40–129)
ALT SERPL-CCNC: 5 U/L (ref 5–41)
AMORPHOUS: ABNORMAL
AMYLASE: 16 U/L (ref 28–100)
ANION GAP SERPL CALCULATED.3IONS-SCNC: 17 MMOL/L (ref 9–17)
AST SERPL-CCNC: 13 U/L
BACTERIA: ABNORMAL
BASOPHILS # BLD: 1 % (ref 0–2)
BASOPHILS ABSOLUTE: 0.1 K/UL (ref 0–0.2)
BILIRUB SERPL-MCNC: 0.7 MG/DL (ref 0.3–1.2)
BILIRUBIN DIRECT: 0.35 MG/DL
BILIRUBIN URINE: NEGATIVE
BILIRUBIN, INDIRECT: 0.35 MG/DL (ref 0–1)
BUN BLDV-MCNC: 38 MG/DL (ref 8–23)
BUN/CREAT BLD: ABNORMAL (ref 9–20)
CALCIUM SERPL-MCNC: 9.8 MG/DL (ref 8.6–10.4)
CASTS UA: ABNORMAL /LPF
CHLORIDE BLD-SCNC: 96 MMOL/L (ref 98–107)
CO2: 23 MMOL/L (ref 20–31)
COLOR: YELLOW
COMMENT UA: ABNORMAL
CREAT SERPL-MCNC: 2.06 MG/DL (ref 0.7–1.2)
CRYSTALS, UA: ABNORMAL /HPF
DIFFERENTIAL TYPE: ABNORMAL
EOSINOPHILS RELATIVE PERCENT: 0 % (ref 1–4)
EPITHELIAL CELLS UA: ABNORMAL /HPF (ref 0–5)
GFR AFRICAN AMERICAN: 38 ML/MIN
GFR NON-AFRICAN AMERICAN: 32 ML/MIN
GFR SERPL CREATININE-BSD FRML MDRD: ABNORMAL ML/MIN/{1.73_M2}
GFR SERPL CREATININE-BSD FRML MDRD: ABNORMAL ML/MIN/{1.73_M2}
GLOBULIN: ABNORMAL G/DL (ref 1.5–3.8)
GLUCOSE BLD-MCNC: 143 MG/DL (ref 70–99)
GLUCOSE URINE: NEGATIVE
HCT VFR BLD CALC: 29.3 % (ref 41–53)
HEMOGLOBIN: 9.6 G/DL (ref 13.5–17.5)
IMMATURE GRANULOCYTES: ABNORMAL %
KETONES, URINE: NEGATIVE
LACTIC ACID, WHOLE BLOOD: 0.8 MMOL/L (ref 0.7–2.1)
LACTIC ACID: 1.5 MMOL/L (ref 0.5–2.2)
LACTIC ACID: NORMAL MMOL/L
LEUKOCYTE ESTERASE, URINE: ABNORMAL
LIPASE: 5 U/L (ref 13–60)
LYMPHOCYTES # BLD: 9 % (ref 24–44)
MCH RBC QN AUTO: 34.8 PG (ref 26–34)
MCHC RBC AUTO-ENTMCNC: 32.7 G/DL (ref 31–37)
MCV RBC AUTO: 106.5 FL (ref 80–100)
MONOCYTES # BLD: 8 % (ref 2–11)
MUCUS: ABNORMAL
NITRITE, URINE: POSITIVE
NRBC AUTOMATED: ABNORMAL PER 100 WBC
OTHER OBSERVATIONS UA: ABNORMAL
PDW BLD-RTO: 18 % (ref 12.5–15.4)
PH UA: 7 (ref 5–8)
PLATELET # BLD: 202 K/UL (ref 140–450)
PLATELET ESTIMATE: ABNORMAL
PMV BLD AUTO: 8.3 FL (ref 6–12)
POTASSIUM SERPL-SCNC: 4.1 MMOL/L (ref 3.7–5.3)
PROTEIN UA: NEGATIVE
RBC # BLD: 2.75 M/UL (ref 4.5–5.9)
RBC # BLD: ABNORMAL 10*6/UL
RBC UA: ABNORMAL /HPF (ref 0–2)
RENAL EPITHELIAL, UA: ABNORMAL /HPF
SEG NEUTROPHILS: 82 % (ref 36–66)
SEGMENTED NEUTROPHILS ABSOLUTE COUNT: 5.7 K/UL (ref 1.8–7.7)
SODIUM BLD-SCNC: 136 MMOL/L (ref 135–144)
SPECIFIC GRAVITY UA: 1.01 (ref 1–1.03)
TOTAL PROTEIN: 7.9 G/DL (ref 6.4–8.3)
TRICHOMONAS: ABNORMAL
TROPONIN INTERP: ABNORMAL
TROPONIN INTERP: ABNORMAL
TROPONIN T: ABNORMAL NG/ML
TROPONIN T: ABNORMAL NG/ML
TROPONIN, HIGH SENSITIVITY: 35 NG/L (ref 0–22)
TROPONIN, HIGH SENSITIVITY: 37 NG/L (ref 0–22)
TURBIDITY: ABNORMAL
URINE HGB: ABNORMAL
UROBILINOGEN, URINE: NORMAL
WBC # BLD: 6.9 K/UL (ref 3.5–11)
WBC # BLD: ABNORMAL 10*3/UL
WBC UA: ABNORMAL /HPF (ref 0–5)
YEAST: ABNORMAL

## 2019-10-30 PROCEDURE — 99285 EMERGENCY DEPT VISIT HI MDM: CPT

## 2019-10-30 PROCEDURE — 1200000000 HC SEMI PRIVATE

## 2019-10-30 PROCEDURE — 85025 COMPLETE CBC W/AUTO DIFF WBC: CPT

## 2019-10-30 PROCEDURE — 87086 URINE CULTURE/COLONY COUNT: CPT

## 2019-10-30 PROCEDURE — 96375 TX/PRO/DX INJ NEW DRUG ADDON: CPT

## 2019-10-30 PROCEDURE — 6360000002 HC RX W HCPCS: Performed by: NURSE PRACTITIONER

## 2019-10-30 PROCEDURE — 93005 ELECTROCARDIOGRAM TRACING: CPT | Performed by: EMERGENCY MEDICINE

## 2019-10-30 PROCEDURE — 83605 ASSAY OF LACTIC ACID: CPT

## 2019-10-30 PROCEDURE — 6370000000 HC RX 637 (ALT 250 FOR IP): Performed by: NURSE PRACTITIONER

## 2019-10-30 PROCEDURE — 2580000003 HC RX 258: Performed by: EMERGENCY MEDICINE

## 2019-10-30 PROCEDURE — 84484 ASSAY OF TROPONIN QUANT: CPT

## 2019-10-30 PROCEDURE — 81001 URINALYSIS AUTO W/SCOPE: CPT

## 2019-10-30 PROCEDURE — 87077 CULTURE AEROBIC IDENTIFY: CPT

## 2019-10-30 PROCEDURE — 96365 THER/PROPH/DIAG IV INF INIT: CPT

## 2019-10-30 PROCEDURE — 74176 CT ABD & PELVIS W/O CONTRAST: CPT

## 2019-10-30 PROCEDURE — 6360000002 HC RX W HCPCS: Performed by: EMERGENCY MEDICINE

## 2019-10-30 PROCEDURE — 2580000003 HC RX 258: Performed by: NURSE PRACTITIONER

## 2019-10-30 PROCEDURE — 82150 ASSAY OF AMYLASE: CPT

## 2019-10-30 PROCEDURE — 80048 BASIC METABOLIC PNL TOTAL CA: CPT

## 2019-10-30 PROCEDURE — 99223 1ST HOSP IP/OBS HIGH 75: CPT | Performed by: NURSE PRACTITIONER

## 2019-10-30 PROCEDURE — 94640 AIRWAY INHALATION TREATMENT: CPT

## 2019-10-30 PROCEDURE — 80076 HEPATIC FUNCTION PANEL: CPT

## 2019-10-30 PROCEDURE — 36415 COLL VENOUS BLD VENIPUNCTURE: CPT

## 2019-10-30 PROCEDURE — 87186 SC STD MICRODIL/AGAR DIL: CPT

## 2019-10-30 PROCEDURE — 83690 ASSAY OF LIPASE: CPT

## 2019-10-30 RX ORDER — ATORVASTATIN CALCIUM 40 MG/1
40 TABLET, FILM COATED ORAL DAILY
Status: DISCONTINUED | OUTPATIENT
Start: 2019-10-30 | End: 2019-11-02 | Stop reason: HOSPADM

## 2019-10-30 RX ORDER — M-VIT,TX,IRON,MINS/CALC/FOLIC 27MG-0.4MG
1 TABLET ORAL DAILY
Status: DISCONTINUED | OUTPATIENT
Start: 2019-10-30 | End: 2019-11-02 | Stop reason: HOSPADM

## 2019-10-30 RX ORDER — ALLOPURINOL 100 MG/1
100 TABLET ORAL DAILY
Status: DISCONTINUED | OUTPATIENT
Start: 2019-10-30 | End: 2019-11-02 | Stop reason: HOSPADM

## 2019-10-30 RX ORDER — FUROSEMIDE 40 MG/1
80 TABLET ORAL DAILY
Status: DISCONTINUED | OUTPATIENT
Start: 2019-10-30 | End: 2019-11-02 | Stop reason: HOSPADM

## 2019-10-30 RX ORDER — CAPTOPRIL 25 MG/1
25 TABLET ORAL 3 TIMES DAILY
Status: DISCONTINUED | OUTPATIENT
Start: 2019-10-30 | End: 2019-11-02 | Stop reason: HOSPADM

## 2019-10-30 RX ORDER — SODIUM CHLORIDE 0.9 % (FLUSH) 0.9 %
10 SYRINGE (ML) INJECTION PRN
Status: DISCONTINUED | OUTPATIENT
Start: 2019-10-30 | End: 2019-11-02 | Stop reason: HOSPADM

## 2019-10-30 RX ORDER — MORPHINE SULFATE 15 MG/1
30 TABLET, FILM COATED, EXTENDED RELEASE ORAL 3 TIMES DAILY
Status: DISCONTINUED | OUTPATIENT
Start: 2019-10-30 | End: 2019-11-01

## 2019-10-30 RX ORDER — ONDANSETRON 2 MG/ML
4 INJECTION INTRAMUSCULAR; INTRAVENOUS EVERY 6 HOURS PRN
Status: DISCONTINUED | OUTPATIENT
Start: 2019-10-30 | End: 2019-11-02 | Stop reason: HOSPADM

## 2019-10-30 RX ORDER — ATENOLOL 25 MG/1
25 TABLET ORAL DAILY
Status: DISCONTINUED | OUTPATIENT
Start: 2019-10-30 | End: 2019-11-02 | Stop reason: HOSPADM

## 2019-10-30 RX ORDER — SODIUM CHLORIDE 0.9 % (FLUSH) 0.9 %
10 SYRINGE (ML) INJECTION EVERY 12 HOURS SCHEDULED
Status: DISCONTINUED | OUTPATIENT
Start: 2019-10-30 | End: 2019-11-02 | Stop reason: HOSPADM

## 2019-10-30 RX ORDER — SODIUM CHLORIDE 9 MG/ML
INJECTION, SOLUTION INTRAVENOUS CONTINUOUS
Status: DISCONTINUED | OUTPATIENT
Start: 2019-10-30 | End: 2019-11-02

## 2019-10-30 RX ORDER — 0.9 % SODIUM CHLORIDE 0.9 %
500 INTRAVENOUS SOLUTION INTRAVENOUS ONCE
Status: COMPLETED | OUTPATIENT
Start: 2019-10-30 | End: 2019-10-30

## 2019-10-30 RX ORDER — ACETAMINOPHEN 325 MG/1
650 TABLET ORAL EVERY 4 HOURS PRN
Status: DISCONTINUED | OUTPATIENT
Start: 2019-10-30 | End: 2019-11-02 | Stop reason: HOSPADM

## 2019-10-30 RX ORDER — ONDANSETRON 2 MG/ML
4 INJECTION INTRAMUSCULAR; INTRAVENOUS ONCE
Status: COMPLETED | OUTPATIENT
Start: 2019-10-30 | End: 2019-10-30

## 2019-10-30 RX ORDER — ISOSORBIDE MONONITRATE 60 MG/1
60 TABLET, EXTENDED RELEASE ORAL DAILY
Status: DISCONTINUED | OUTPATIENT
Start: 2019-10-30 | End: 2019-11-02 | Stop reason: HOSPADM

## 2019-10-30 RX ORDER — NICOTINE 21 MG/24HR
1 PATCH, TRANSDERMAL 24 HOURS TRANSDERMAL DAILY PRN
Status: DISCONTINUED | OUTPATIENT
Start: 2019-10-30 | End: 2019-11-02 | Stop reason: HOSPADM

## 2019-10-30 RX ORDER — POTASSIUM CHLORIDE 20 MEQ/1
20 TABLET, EXTENDED RELEASE ORAL DAILY
Status: DISCONTINUED | OUTPATIENT
Start: 2019-10-30 | End: 2019-11-02 | Stop reason: HOSPADM

## 2019-10-30 RX ADMIN — SODIUM CHLORIDE 500 ML: 9 INJECTION, SOLUTION INTRAVENOUS at 11:50

## 2019-10-30 RX ADMIN — Medication 10 ML: at 20:51

## 2019-10-30 RX ADMIN — SODIUM CHLORIDE: 9 INJECTION, SOLUTION INTRAVENOUS at 20:21

## 2019-10-30 RX ADMIN — MULTIPLE VITAMINS W/ MINERALS TAB 1 TABLET: TAB at 20:49

## 2019-10-30 RX ADMIN — ONDANSETRON 4 MG: 2 INJECTION INTRAMUSCULAR; INTRAVENOUS at 11:51

## 2019-10-30 RX ADMIN — MOMETASONE FUROATE AND FORMOTEROL FUMARATE DIHYDRATE 2 PUFF: 100; 5 AEROSOL RESPIRATORY (INHALATION) at 20:18

## 2019-10-30 RX ADMIN — MORPHINE SULFATE 30 MG: 15 TABLET, EXTENDED RELEASE ORAL at 20:24

## 2019-10-30 RX ADMIN — ENOXAPARIN SODIUM 40 MG: 40 INJECTION, SOLUTION INTRAVENOUS; SUBCUTANEOUS at 20:49

## 2019-10-30 RX ADMIN — CEFTRIAXONE SODIUM 1 G: 1 INJECTION, POWDER, FOR SOLUTION INTRAMUSCULAR; INTRAVENOUS at 13:00

## 2019-10-30 ASSESSMENT — PAIN DESCRIPTION - PAIN TYPE
TYPE: ACUTE PAIN
TYPE: ACUTE PAIN

## 2019-10-30 ASSESSMENT — PAIN DESCRIPTION - LOCATION
LOCATION: ABDOMEN
LOCATION: ABDOMEN

## 2019-10-30 ASSESSMENT — PAIN SCALES - GENERAL
PAINLEVEL_OUTOF10: 4
PAINLEVEL_OUTOF10: 4

## 2019-10-31 ENCOUNTER — APPOINTMENT (OUTPATIENT)
Dept: GENERAL RADIOLOGY | Age: 74
DRG: 698 | End: 2019-10-31
Payer: MEDICARE

## 2019-10-31 PROBLEM — N18.30 STAGE 3 CHRONIC KIDNEY DISEASE (HCC): Status: ACTIVE | Noted: 2019-10-31

## 2019-10-31 PROBLEM — G93.41 ACUTE METABOLIC ENCEPHALOPATHY: Status: ACTIVE | Noted: 2019-10-31

## 2019-10-31 PROBLEM — N17.9 AKI (ACUTE KIDNEY INJURY) (HCC): Status: ACTIVE | Noted: 2019-10-31

## 2019-10-31 LAB
ANION GAP SERPL CALCULATED.3IONS-SCNC: 14 MMOL/L (ref 9–17)
BNP INTERPRETATION: ABNORMAL
BUN BLDV-MCNC: 36 MG/DL (ref 8–23)
BUN/CREAT BLD: ABNORMAL (ref 9–20)
CALCIUM SERPL-MCNC: 8.5 MG/DL (ref 8.6–10.4)
CHLORIDE BLD-SCNC: 104 MMOL/L (ref 98–107)
CO2: 19 MMOL/L (ref 20–31)
CREAT SERPL-MCNC: 1.79 MG/DL (ref 0.7–1.2)
EKG ATRIAL RATE: 84 BPM
EKG P AXIS: 48 DEGREES
EKG P-R INTERVAL: 104 MS
EKG Q-T INTERVAL: 358 MS
EKG QRS DURATION: 94 MS
EKG QTC CALCULATION (BAZETT): 423 MS
EKG R AXIS: -7 DEGREES
EKG T AXIS: 25 DEGREES
EKG VENTRICULAR RATE: 84 BPM
GFR AFRICAN AMERICAN: 45 ML/MIN
GFR NON-AFRICAN AMERICAN: 37 ML/MIN
GFR SERPL CREATININE-BSD FRML MDRD: ABNORMAL ML/MIN/{1.73_M2}
GFR SERPL CREATININE-BSD FRML MDRD: ABNORMAL ML/MIN/{1.73_M2}
GLUCOSE BLD-MCNC: 106 MG/DL (ref 75–110)
GLUCOSE BLD-MCNC: 113 MG/DL (ref 75–110)
GLUCOSE BLD-MCNC: 120 MG/DL (ref 70–99)
GLUCOSE BLD-MCNC: 150 MG/DL (ref 75–110)
GLUCOSE BLD-MCNC: 179 MG/DL (ref 75–110)
HCT VFR BLD CALC: 27.8 % (ref 40.7–50.3)
HEMOGLOBIN: 8.5 G/DL (ref 13–17)
INR BLD: 1
LACTIC ACID, WHOLE BLOOD: 0.8 MMOL/L (ref 0.7–2.1)
LACTIC ACID: NORMAL MMOL/L
MCH RBC QN AUTO: 34.4 PG (ref 25.2–33.5)
MCHC RBC AUTO-ENTMCNC: 30.6 G/DL (ref 28.4–34.8)
MCV RBC AUTO: 112.6 FL (ref 82.6–102.9)
NRBC AUTOMATED: 0 PER 100 WBC
PDW BLD-RTO: 16.7 % (ref 11.8–14.4)
PLATELET # BLD: 151 K/UL (ref 138–453)
PMV BLD AUTO: 10.4 FL (ref 8.1–13.5)
POTASSIUM SERPL-SCNC: 3.9 MMOL/L (ref 3.7–5.3)
PRO-BNP: 1824 PG/ML
PROTHROMBIN TIME: 11 SEC (ref 9–12)
RBC # BLD: 2.47 M/UL (ref 4.21–5.77)
SODIUM BLD-SCNC: 137 MMOL/L (ref 135–144)
WBC # BLD: 5.1 K/UL (ref 3.5–11.3)

## 2019-10-31 PROCEDURE — 90686 IIV4 VACC NO PRSV 0.5 ML IM: CPT | Performed by: INTERNAL MEDICINE

## 2019-10-31 PROCEDURE — G0008 ADMIN INFLUENZA VIRUS VAC: HCPCS | Performed by: INTERNAL MEDICINE

## 2019-10-31 PROCEDURE — 36415 COLL VENOUS BLD VENIPUNCTURE: CPT

## 2019-10-31 PROCEDURE — APPNB15 APP NON BILLABLE TIME 0-15 MINS: Performed by: NURSE PRACTITIONER

## 2019-10-31 PROCEDURE — 2580000003 HC RX 258: Performed by: INTERNAL MEDICINE

## 2019-10-31 PROCEDURE — 82947 ASSAY GLUCOSE BLOOD QUANT: CPT

## 2019-10-31 PROCEDURE — 6370000000 HC RX 637 (ALT 250 FOR IP): Performed by: NURSE PRACTITIONER

## 2019-10-31 PROCEDURE — 1200000000 HC SEMI PRIVATE

## 2019-10-31 PROCEDURE — 85610 PROTHROMBIN TIME: CPT

## 2019-10-31 PROCEDURE — 6360000002 HC RX W HCPCS: Performed by: NURSE PRACTITIONER

## 2019-10-31 PROCEDURE — 85027 COMPLETE CBC AUTOMATED: CPT

## 2019-10-31 PROCEDURE — 99232 SBSQ HOSP IP/OBS MODERATE 35: CPT | Performed by: INTERNAL MEDICINE

## 2019-10-31 PROCEDURE — 80048 BASIC METABOLIC PNL TOTAL CA: CPT

## 2019-10-31 PROCEDURE — 83605 ASSAY OF LACTIC ACID: CPT

## 2019-10-31 PROCEDURE — 94640 AIRWAY INHALATION TREATMENT: CPT

## 2019-10-31 PROCEDURE — 6360000002 HC RX W HCPCS: Performed by: INTERNAL MEDICINE

## 2019-10-31 PROCEDURE — 2580000003 HC RX 258: Performed by: NURSE PRACTITIONER

## 2019-10-31 PROCEDURE — 94760 N-INVAS EAR/PLS OXIMETRY 1: CPT

## 2019-10-31 PROCEDURE — 83880 ASSAY OF NATRIURETIC PEPTIDE: CPT

## 2019-10-31 PROCEDURE — 71045 X-RAY EXAM CHEST 1 VIEW: CPT

## 2019-10-31 RX ORDER — 0.9 % SODIUM CHLORIDE 0.9 %
500 INTRAVENOUS SOLUTION INTRAVENOUS ONCE
Status: COMPLETED | OUTPATIENT
Start: 2019-10-31 | End: 2019-10-31

## 2019-10-31 RX ORDER — DEXTROSE MONOHYDRATE 50 MG/ML
100 INJECTION, SOLUTION INTRAVENOUS PRN
Status: DISCONTINUED | OUTPATIENT
Start: 2019-10-31 | End: 2019-11-02 | Stop reason: HOSPADM

## 2019-10-31 RX ORDER — MIDODRINE HYDROCHLORIDE 5 MG/1
10 TABLET ORAL ONCE
Status: COMPLETED | OUTPATIENT
Start: 2019-10-31 | End: 2019-10-31

## 2019-10-31 RX ORDER — DEXTROSE MONOHYDRATE 25 G/50ML
12.5 INJECTION, SOLUTION INTRAVENOUS PRN
Status: DISCONTINUED | OUTPATIENT
Start: 2019-10-31 | End: 2019-11-02 | Stop reason: HOSPADM

## 2019-10-31 RX ORDER — 0.9 % SODIUM CHLORIDE 0.9 %
1000 INTRAVENOUS SOLUTION INTRAVENOUS ONCE
Status: COMPLETED | OUTPATIENT
Start: 2019-10-31 | End: 2019-10-31

## 2019-10-31 RX ORDER — NICOTINE POLACRILEX 4 MG
15 LOZENGE BUCCAL PRN
Status: DISCONTINUED | OUTPATIENT
Start: 2019-10-31 | End: 2019-11-02 | Stop reason: HOSPADM

## 2019-10-31 RX ADMIN — ALLOPURINOL 100 MG: 100 TABLET ORAL at 09:49

## 2019-10-31 RX ADMIN — MAGNESIUM GLUCONATE 500 MG ORAL TABLET 400 MG: 500 TABLET ORAL at 09:51

## 2019-10-31 RX ADMIN — INFLUENZA A VIRUS A/BRISBANE/02/2018 IVR-190 (H1N1) ANTIGEN (PROPIOLACTONE INACTIVATED), INFLUENZA A VIRUS A/KANSAS/14/2017 X-327 (H3N2) ANTIGEN (PROPIOLACTONE INACTIVATED), INFLUENZA B VIRUS B/MARYLAND/15/2016 ANTIGEN (PROPIOLACTONE INACTIVATED), INFLUENZA B VIRUS B/PHUKET/3073/2013 BVR-1B ANTIGEN (PROPIOLACTONE INACTIVATED) 0.5 ML: 15; 15; 15; 15 INJECTION, SUSPENSION INTRAMUSCULAR at 10:57

## 2019-10-31 RX ADMIN — MOMETASONE FUROATE AND FORMOTEROL FUMARATE DIHYDRATE 2 PUFF: 100; 5 AEROSOL RESPIRATORY (INHALATION) at 09:19

## 2019-10-31 RX ADMIN — ACETAMINOPHEN 650 MG: 325 TABLET ORAL at 18:17

## 2019-10-31 RX ADMIN — SODIUM CHLORIDE 500 ML: 9 INJECTION, SOLUTION INTRAVENOUS at 12:26

## 2019-10-31 RX ADMIN — MIDODRINE HYDROCHLORIDE 10 MG: 5 TABLET ORAL at 20:39

## 2019-10-31 RX ADMIN — MOMETASONE FUROATE AND FORMOTEROL FUMARATE DIHYDRATE 2 PUFF: 100; 5 AEROSOL RESPIRATORY (INHALATION) at 21:24

## 2019-10-31 RX ADMIN — ENOXAPARIN SODIUM 40 MG: 40 INJECTION, SOLUTION INTRAVENOUS; SUBCUTANEOUS at 09:50

## 2019-10-31 RX ADMIN — SODIUM CHLORIDE: 9 INJECTION, SOLUTION INTRAVENOUS at 23:02

## 2019-10-31 RX ADMIN — MULTIPLE VITAMINS W/ MINERALS TAB 1 TABLET: TAB at 09:51

## 2019-10-31 RX ADMIN — MORPHINE SULFATE 30 MG: 15 TABLET, EXTENDED RELEASE ORAL at 10:09

## 2019-10-31 RX ADMIN — SODIUM CHLORIDE 1000 ML: 9 INJECTION, SOLUTION INTRAVENOUS at 16:00

## 2019-10-31 RX ADMIN — SODIUM CHLORIDE 500 ML: 9 INJECTION, SOLUTION INTRAVENOUS at 18:42

## 2019-10-31 RX ADMIN — CEFTRIAXONE SODIUM 1 G: 1 INJECTION, POWDER, FOR SOLUTION INTRAMUSCULAR; INTRAVENOUS at 14:28

## 2019-10-31 RX ADMIN — MORPHINE SULFATE 30 MG: 15 TABLET, EXTENDED RELEASE ORAL at 20:52

## 2019-10-31 RX ADMIN — DESMOPRESSIN ACETATE 40 MG: 0.2 TABLET ORAL at 09:49

## 2019-10-31 ASSESSMENT — PAIN DESCRIPTION - DESCRIPTORS: DESCRIPTORS: JABBING

## 2019-10-31 ASSESSMENT — PAIN DESCRIPTION - FREQUENCY: FREQUENCY: INTERMITTENT

## 2019-10-31 ASSESSMENT — PAIN SCALES - GENERAL
PAINLEVEL_OUTOF10: 5
PAINLEVEL_OUTOF10: 7
PAINLEVEL_OUTOF10: 4

## 2019-10-31 ASSESSMENT — PAIN DESCRIPTION - LOCATION
LOCATION: ABDOMEN
LOCATION: BACK

## 2019-10-31 ASSESSMENT — PAIN - FUNCTIONAL ASSESSMENT: PAIN_FUNCTIONAL_ASSESSMENT: PREVENTS OR INTERFERES SOME ACTIVE ACTIVITIES AND ADLS

## 2019-10-31 ASSESSMENT — PAIN DESCRIPTION - PROGRESSION: CLINICAL_PROGRESSION: GRADUALLY WORSENING

## 2019-10-31 ASSESSMENT — PAIN DESCRIPTION - PAIN TYPE
TYPE: ACUTE PAIN
TYPE: ACUTE PAIN

## 2019-10-31 ASSESSMENT — PAIN DESCRIPTION - ORIENTATION: ORIENTATION: LOWER

## 2019-10-31 ASSESSMENT — PAIN DESCRIPTION - ONSET: ONSET: UNABLE TO TELL

## 2019-11-01 LAB
ABSOLUTE EOS #: 0.08 K/UL (ref 0–0.4)
ABSOLUTE IMMATURE GRANULOCYTE: 0.08 K/UL (ref 0–0.3)
ABSOLUTE LYMPH #: 0.43 K/UL (ref 1–4.8)
ABSOLUTE MONO #: 0.23 K/UL (ref 0.1–0.8)
ANION GAP SERPL CALCULATED.3IONS-SCNC: 11 MMOL/L (ref 9–17)
BASOPHILS # BLD: 1 % (ref 0–2)
BASOPHILS ABSOLUTE: 0.04 K/UL (ref 0–0.2)
BUN BLDV-MCNC: 28 MG/DL (ref 8–23)
BUN/CREAT BLD: ABNORMAL (ref 9–20)
CALCIUM SERPL-MCNC: 8 MG/DL (ref 8.6–10.4)
CHLORIDE BLD-SCNC: 108 MMOL/L (ref 98–107)
CO2: 19 MMOL/L (ref 20–31)
CREAT SERPL-MCNC: 1.26 MG/DL (ref 0.7–1.2)
CULTURE: ABNORMAL
DIFFERENTIAL TYPE: ABNORMAL
EOSINOPHILS RELATIVE PERCENT: 2 % (ref 1–4)
GFR AFRICAN AMERICAN: >60 ML/MIN
GFR NON-AFRICAN AMERICAN: 56 ML/MIN
GFR SERPL CREATININE-BSD FRML MDRD: ABNORMAL ML/MIN/{1.73_M2}
GFR SERPL CREATININE-BSD FRML MDRD: ABNORMAL ML/MIN/{1.73_M2}
GLUCOSE BLD-MCNC: 112 MG/DL (ref 75–110)
GLUCOSE BLD-MCNC: 117 MG/DL (ref 75–110)
GLUCOSE BLD-MCNC: 119 MG/DL (ref 70–99)
GLUCOSE BLD-MCNC: 133 MG/DL (ref 75–110)
GLUCOSE BLD-MCNC: 154 MG/DL (ref 75–110)
HCT VFR BLD CALC: 24 % (ref 40.7–50.3)
HEMOGLOBIN: 7.5 G/DL (ref 13–17)
IMMATURE GRANULOCYTES: 2 %
LYMPHOCYTES # BLD: 11 % (ref 24–44)
Lab: ABNORMAL
MCH RBC QN AUTO: 34.6 PG (ref 25.2–33.5)
MCHC RBC AUTO-ENTMCNC: 31.3 G/DL (ref 28.4–34.8)
MCV RBC AUTO: 110.6 FL (ref 82.6–102.9)
MONOCYTES # BLD: 6 % (ref 1–7)
MORPHOLOGY: ABNORMAL
NRBC AUTOMATED: 0 PER 100 WBC
PDW BLD-RTO: 16.6 % (ref 11.8–14.4)
PLATELET # BLD: 150 K/UL (ref 138–453)
PLATELET ESTIMATE: ABNORMAL
PMV BLD AUTO: 10.8 FL (ref 8.1–13.5)
POTASSIUM SERPL-SCNC: 4 MMOL/L (ref 3.7–5.3)
RBC # BLD: 2.17 M/UL (ref 4.21–5.77)
RBC # BLD: ABNORMAL 10*6/UL
SEG NEUTROPHILS: 78 % (ref 36–66)
SEGMENTED NEUTROPHILS ABSOLUTE COUNT: 3.04 K/UL (ref 1.8–7.7)
SODIUM BLD-SCNC: 138 MMOL/L (ref 135–144)
SPECIMEN DESCRIPTION: ABNORMAL
WBC # BLD: 3.9 K/UL (ref 3.5–11.3)
WBC # BLD: ABNORMAL 10*3/UL

## 2019-11-01 PROCEDURE — 85025 COMPLETE CBC W/AUTO DIFF WBC: CPT

## 2019-11-01 PROCEDURE — 99232 SBSQ HOSP IP/OBS MODERATE 35: CPT | Performed by: INTERNAL MEDICINE

## 2019-11-01 PROCEDURE — 80048 BASIC METABOLIC PNL TOTAL CA: CPT

## 2019-11-01 PROCEDURE — 6360000002 HC RX W HCPCS: Performed by: NURSE PRACTITIONER

## 2019-11-01 PROCEDURE — 82947 ASSAY GLUCOSE BLOOD QUANT: CPT

## 2019-11-01 PROCEDURE — 36415 COLL VENOUS BLD VENIPUNCTURE: CPT

## 2019-11-01 PROCEDURE — 6370000000 HC RX 637 (ALT 250 FOR IP): Performed by: INTERNAL MEDICINE

## 2019-11-01 PROCEDURE — 94640 AIRWAY INHALATION TREATMENT: CPT

## 2019-11-01 PROCEDURE — 2580000003 HC RX 258: Performed by: NURSE PRACTITIONER

## 2019-11-01 PROCEDURE — 1200000000 HC SEMI PRIVATE

## 2019-11-01 PROCEDURE — 6370000000 HC RX 637 (ALT 250 FOR IP): Performed by: NURSE PRACTITIONER

## 2019-11-01 RX ORDER — MORPHINE SULFATE 15 MG/1
30 TABLET, FILM COATED, EXTENDED RELEASE ORAL 3 TIMES DAILY
Status: DISCONTINUED | OUTPATIENT
Start: 2019-11-01 | End: 2019-11-02 | Stop reason: HOSPADM

## 2019-11-01 RX ADMIN — MORPHINE SULFATE 30 MG: 15 TABLET, EXTENDED RELEASE ORAL at 21:50

## 2019-11-01 RX ADMIN — INSULIN LISPRO 1 UNITS: 100 INJECTION, SOLUTION INTRAVENOUS; SUBCUTANEOUS at 21:01

## 2019-11-01 RX ADMIN — MORPHINE SULFATE 30 MG: 15 TABLET, EXTENDED RELEASE ORAL at 09:28

## 2019-11-01 RX ADMIN — MORPHINE SULFATE 30 MG: 15 TABLET, EXTENDED RELEASE ORAL at 13:59

## 2019-11-01 RX ADMIN — CEFTRIAXONE SODIUM 1 G: 1 INJECTION, POWDER, FOR SOLUTION INTRAMUSCULAR; INTRAVENOUS at 12:42

## 2019-11-01 RX ADMIN — MAGNESIUM GLUCONATE 500 MG ORAL TABLET 400 MG: 500 TABLET ORAL at 09:10

## 2019-11-01 RX ADMIN — MULTIPLE VITAMINS W/ MINERALS TAB 1 TABLET: TAB at 09:10

## 2019-11-01 RX ADMIN — MOMETASONE FUROATE AND FORMOTEROL FUMARATE DIHYDRATE 2 PUFF: 100; 5 AEROSOL RESPIRATORY (INHALATION) at 22:29

## 2019-11-01 RX ADMIN — ENOXAPARIN SODIUM 40 MG: 40 INJECTION, SOLUTION INTRAVENOUS; SUBCUTANEOUS at 09:10

## 2019-11-01 RX ADMIN — ALLOPURINOL 100 MG: 100 TABLET ORAL at 09:10

## 2019-11-01 RX ADMIN — MOMETASONE FUROATE AND FORMOTEROL FUMARATE DIHYDRATE 2 PUFF: 100; 5 AEROSOL RESPIRATORY (INHALATION) at 08:09

## 2019-11-01 RX ADMIN — DESMOPRESSIN ACETATE 40 MG: 0.2 TABLET ORAL at 09:10

## 2019-11-01 ASSESSMENT — PAIN DESCRIPTION - DESCRIPTORS: DESCRIPTORS: ACHING

## 2019-11-01 ASSESSMENT — PAIN DESCRIPTION - PAIN TYPE
TYPE: CHRONIC PAIN

## 2019-11-01 ASSESSMENT — PAIN SCALES - GENERAL
PAINLEVEL_OUTOF10: 5
PAINLEVEL_OUTOF10: 3
PAINLEVEL_OUTOF10: 5
PAINLEVEL_OUTOF10: 8
PAINLEVEL_OUTOF10: 3

## 2019-11-01 ASSESSMENT — PAIN DESCRIPTION - ORIENTATION: ORIENTATION: LOWER

## 2019-11-01 ASSESSMENT — PAIN DESCRIPTION - LOCATION
LOCATION: BACK

## 2019-11-02 VITALS
OXYGEN SATURATION: 100 % | BODY MASS INDEX: 29.82 KG/M2 | SYSTOLIC BLOOD PRESSURE: 108 MMHG | HEART RATE: 79 BPM | RESPIRATION RATE: 16 BRPM | WEIGHT: 213 LBS | TEMPERATURE: 98 F | DIASTOLIC BLOOD PRESSURE: 64 MMHG | HEIGHT: 71 IN

## 2019-11-02 LAB
ABSOLUTE EOS #: 0.2 K/UL (ref 0–0.44)
ABSOLUTE IMMATURE GRANULOCYTE: <0.03 K/UL (ref 0–0.3)
ABSOLUTE LYMPH #: 0.99 K/UL (ref 1.1–3.7)
ABSOLUTE MONO #: 0.54 K/UL (ref 0.1–1.2)
ANION GAP SERPL CALCULATED.3IONS-SCNC: 13 MMOL/L (ref 9–17)
BASOPHILS # BLD: 0 % (ref 0–2)
BASOPHILS ABSOLUTE: <0.03 K/UL (ref 0–0.2)
BUN BLDV-MCNC: 19 MG/DL (ref 8–23)
BUN/CREAT BLD: ABNORMAL (ref 9–20)
CALCIUM SERPL-MCNC: 8.3 MG/DL (ref 8.6–10.4)
CHLORIDE BLD-SCNC: 112 MMOL/L (ref 98–107)
CO2: 17 MMOL/L (ref 20–31)
CREAT SERPL-MCNC: 1.12 MG/DL (ref 0.7–1.2)
DIFFERENTIAL TYPE: ABNORMAL
EOSINOPHILS RELATIVE PERCENT: 5 % (ref 1–4)
GFR AFRICAN AMERICAN: >60 ML/MIN
GFR NON-AFRICAN AMERICAN: >60 ML/MIN
GFR SERPL CREATININE-BSD FRML MDRD: ABNORMAL ML/MIN/{1.73_M2}
GFR SERPL CREATININE-BSD FRML MDRD: ABNORMAL ML/MIN/{1.73_M2}
GLUCOSE BLD-MCNC: 111 MG/DL (ref 70–99)
GLUCOSE BLD-MCNC: 212 MG/DL (ref 75–110)
HCT VFR BLD CALC: 24.7 % (ref 40.7–50.3)
HEMOGLOBIN: 7.9 G/DL (ref 13–17)
IMMATURE GRANULOCYTES: 0 %
LYMPHOCYTES # BLD: 24 % (ref 24–43)
MCH RBC QN AUTO: 34.8 PG (ref 25.2–33.5)
MCHC RBC AUTO-ENTMCNC: 32 G/DL (ref 28.4–34.8)
MCV RBC AUTO: 108.8 FL (ref 82.6–102.9)
MONOCYTES # BLD: 13 % (ref 3–12)
NRBC AUTOMATED: 0 PER 100 WBC
PDW BLD-RTO: 16.5 % (ref 11.8–14.4)
PLATELET # BLD: 145 K/UL (ref 138–453)
PLATELET ESTIMATE: ABNORMAL
PMV BLD AUTO: 11.3 FL (ref 8.1–13.5)
POTASSIUM SERPL-SCNC: 3.9 MMOL/L (ref 3.7–5.3)
RBC # BLD: 2.27 M/UL (ref 4.21–5.77)
RBC # BLD: ABNORMAL 10*6/UL
SEG NEUTROPHILS: 57 % (ref 36–65)
SEGMENTED NEUTROPHILS ABSOLUTE COUNT: 2.32 K/UL (ref 1.5–8.1)
SODIUM BLD-SCNC: 142 MMOL/L (ref 135–144)
WBC # BLD: 4.1 K/UL (ref 3.5–11.3)
WBC # BLD: ABNORMAL 10*3/UL

## 2019-11-02 PROCEDURE — 6370000000 HC RX 637 (ALT 250 FOR IP): Performed by: NURSE PRACTITIONER

## 2019-11-02 PROCEDURE — 85025 COMPLETE CBC W/AUTO DIFF WBC: CPT

## 2019-11-02 PROCEDURE — 80048 BASIC METABOLIC PNL TOTAL CA: CPT

## 2019-11-02 PROCEDURE — 6360000002 HC RX W HCPCS: Performed by: NURSE PRACTITIONER

## 2019-11-02 PROCEDURE — 2580000003 HC RX 258: Performed by: NURSE PRACTITIONER

## 2019-11-02 PROCEDURE — 6370000000 HC RX 637 (ALT 250 FOR IP): Performed by: INTERNAL MEDICINE

## 2019-11-02 PROCEDURE — 6360000002 HC RX W HCPCS: Performed by: INTERNAL MEDICINE

## 2019-11-02 PROCEDURE — 36415 COLL VENOUS BLD VENIPUNCTURE: CPT

## 2019-11-02 PROCEDURE — 99239 HOSP IP/OBS DSCHRG MGMT >30: CPT | Performed by: INTERNAL MEDICINE

## 2019-11-02 PROCEDURE — 82947 ASSAY GLUCOSE BLOOD QUANT: CPT

## 2019-11-02 RX ORDER — HEPARIN SODIUM,PORCINE 10 UNIT/ML
3 VIAL (ML) INTRAVENOUS ONCE
Status: COMPLETED | OUTPATIENT
Start: 2019-11-02 | End: 2019-11-02

## 2019-11-02 RX ORDER — CIPROFLOXACIN 500 MG/1
500 TABLET, FILM COATED ORAL 2 TIMES DAILY
Qty: 28 TABLET | Refills: 0 | Status: SHIPPED | OUTPATIENT
Start: 2019-11-02 | End: 2019-11-16

## 2019-11-02 RX ADMIN — MORPHINE SULFATE 30 MG: 15 TABLET, EXTENDED RELEASE ORAL at 13:39

## 2019-11-02 RX ADMIN — ENOXAPARIN SODIUM 40 MG: 40 INJECTION, SOLUTION INTRAVENOUS; SUBCUTANEOUS at 09:10

## 2019-11-02 RX ADMIN — MAGNESIUM GLUCONATE 500 MG ORAL TABLET 400 MG: 500 TABLET ORAL at 08:57

## 2019-11-02 RX ADMIN — MORPHINE SULFATE 30 MG: 15 TABLET, EXTENDED RELEASE ORAL at 08:57

## 2019-11-02 RX ADMIN — ALLOPURINOL 100 MG: 100 TABLET ORAL at 08:57

## 2019-11-02 RX ADMIN — INSULIN LISPRO 4 UNITS: 100 INJECTION, SOLUTION INTRAVENOUS; SUBCUTANEOUS at 12:12

## 2019-11-02 RX ADMIN — DESMOPRESSIN ACETATE 40 MG: 0.2 TABLET ORAL at 08:57

## 2019-11-02 RX ADMIN — Medication 30 UNITS: at 13:30

## 2019-11-02 RX ADMIN — CEFTRIAXONE SODIUM 1 G: 1 INJECTION, POWDER, FOR SOLUTION INTRAMUSCULAR; INTRAVENOUS at 12:12

## 2019-11-02 RX ADMIN — MOMETASONE FUROATE AND FORMOTEROL FUMARATE DIHYDRATE 2 PUFF: 100; 5 AEROSOL RESPIRATORY (INHALATION) at 09:29

## 2019-11-02 RX ADMIN — MULTIPLE VITAMINS W/ MINERALS TAB 1 TABLET: TAB at 08:57

## 2019-11-02 ASSESSMENT — PAIN SCALES - GENERAL
PAINLEVEL_OUTOF10: 5

## 2019-11-03 ENCOUNTER — CARE COORDINATION (OUTPATIENT)
Dept: CASE MANAGEMENT | Age: 74
End: 2019-11-03

## 2019-11-03 DIAGNOSIS — N39.0 URINARY TRACT INFECTION WITHOUT HEMATURIA, SITE UNSPECIFIED: Primary | ICD-10-CM

## 2019-11-03 PROCEDURE — 1111F DSCHRG MED/CURRENT MED MERGE: CPT | Performed by: NURSE PRACTITIONER

## 2019-11-04 ENCOUNTER — HOSPITAL ENCOUNTER (OUTPATIENT)
Age: 74
Discharge: HOME | End: 2019-11-04
Payer: MEDICARE

## 2019-11-04 DIAGNOSIS — N17.9 AKI (ACUTE KIDNEY INJURY) (HCC): ICD-10-CM

## 2019-11-04 LAB
ABSOLUTE EOS #: 0.45 K/UL (ref 0–0.4)
ABSOLUTE IMMATURE GRANULOCYTE: 0 K/UL (ref 0–0.3)
ABSOLUTE LYMPH #: 2.48 K/UL (ref 1–4.8)
ABSOLUTE MONO #: 0.6 K/UL (ref 0.1–0.8)
ANION GAP SERPL CALCULATED.3IONS-SCNC: 15 MMOL/L (ref 9–17)
BASOPHILS # BLD: 0 % (ref 0–2)
BASOPHILS ABSOLUTE: 0 K/UL (ref 0–0.2)
BUN BLDV-MCNC: 17 MG/DL (ref 8–23)
BUN/CREAT BLD: ABNORMAL (ref 9–20)
CALCIUM SERPL-MCNC: 8.9 MG/DL (ref 8.6–10.4)
CHLORIDE BLD-SCNC: 109 MMOL/L (ref 98–107)
CO2: 19 MMOL/L (ref 20–31)
CREAT SERPL-MCNC: 1.27 MG/DL (ref 0.7–1.2)
DIFFERENTIAL TYPE: ABNORMAL
EOSINOPHILS RELATIVE PERCENT: 6 % (ref 1–4)
GFR AFRICAN AMERICAN: >60 ML/MIN
GFR NON-AFRICAN AMERICAN: 55 ML/MIN
GFR SERPL CREATININE-BSD FRML MDRD: ABNORMAL ML/MIN/{1.73_M2}
GFR SERPL CREATININE-BSD FRML MDRD: ABNORMAL ML/MIN/{1.73_M2}
GLUCOSE BLD-MCNC: 123 MG/DL (ref 70–99)
HCT VFR BLD CALC: 26.4 % (ref 40.7–50.3)
HEMOGLOBIN: 8.3 G/DL (ref 13–17)
IMMATURE GRANULOCYTES: 0 %
LYMPHOCYTES # BLD: 33 % (ref 24–44)
MCH RBC QN AUTO: 34.3 PG (ref 25.2–33.5)
MCHC RBC AUTO-ENTMCNC: 31.4 G/DL (ref 28.4–34.8)
MCV RBC AUTO: 109.1 FL (ref 82.6–102.9)
MONOCYTES # BLD: 8 % (ref 1–7)
MORPHOLOGY: ABNORMAL
MORPHOLOGY: ABNORMAL
NRBC AUTOMATED: 0 PER 100 WBC
PDW BLD-RTO: 16.7 % (ref 11.8–14.4)
PLATELET # BLD: 186 K/UL (ref 138–453)
PLATELET ESTIMATE: ABNORMAL
PMV BLD AUTO: 11.4 FL (ref 8.1–13.5)
POTASSIUM SERPL-SCNC: 3.6 MMOL/L (ref 3.7–5.3)
RBC # BLD: 2.42 M/UL (ref 4.21–5.77)
RBC # BLD: ABNORMAL 10*6/UL
SEG NEUTROPHILS: 53 % (ref 36–66)
SEGMENTED NEUTROPHILS ABSOLUTE COUNT: 3.97 K/UL (ref 1.8–7.7)
SODIUM BLD-SCNC: 143 MMOL/L (ref 135–144)
WBC # BLD: 7.5 K/UL (ref 3.5–11.3)
WBC # BLD: ABNORMAL 10*3/UL

## 2019-11-04 PROCEDURE — 80048 BASIC METABOLIC PNL TOTAL CA: CPT

## 2019-11-04 PROCEDURE — 36415 COLL VENOUS BLD VENIPUNCTURE: CPT

## 2019-11-04 PROCEDURE — 85025 COMPLETE CBC W/AUTO DIFF WBC: CPT

## 2019-11-07 ENCOUNTER — CARE COORDINATION (OUTPATIENT)
Dept: CASE MANAGEMENT | Age: 74
End: 2019-11-07

## 2019-11-11 ENCOUNTER — CARE COORDINATION (OUTPATIENT)
Dept: CASE MANAGEMENT | Age: 74
End: 2019-11-11

## 2019-11-12 ENCOUNTER — CARE COORDINATION (OUTPATIENT)
Dept: CASE MANAGEMENT | Age: 74
End: 2019-11-12

## 2019-11-13 ENCOUNTER — CARE COORDINATION (OUTPATIENT)
Dept: CASE MANAGEMENT | Age: 74
End: 2019-11-13

## 2019-11-13 ENCOUNTER — TELEPHONE (OUTPATIENT)
Dept: PRIMARY CARE CLINIC | Age: 74
End: 2019-11-13

## 2019-11-15 ENCOUNTER — CARE COORDINATION (OUTPATIENT)
Dept: CASE MANAGEMENT | Age: 74
End: 2019-11-15

## 2019-11-21 ENCOUNTER — HOSPITAL ENCOUNTER (OUTPATIENT)
Age: 74
Setting detail: SPECIMEN
Discharge: HOME | End: 2019-11-21
Payer: MEDICARE

## 2019-11-21 ENCOUNTER — OFFICE VISIT (OUTPATIENT)
Dept: PRIMARY CARE CLINIC | Age: 74
End: 2019-11-21
Payer: MEDICARE

## 2019-11-21 VITALS — WEIGHT: 213 LBS | HEIGHT: 71 IN | BODY MASS INDEX: 29.82 KG/M2

## 2019-11-21 DIAGNOSIS — N39.0 URINARY TRACT INFECTION WITHOUT HEMATURIA, SITE UNSPECIFIED: Primary | ICD-10-CM

## 2019-11-21 DIAGNOSIS — N39.0 URINARY TRACT INFECTION WITHOUT HEMATURIA, SITE UNSPECIFIED: ICD-10-CM

## 2019-11-21 LAB
-: NORMAL
AMORPHOUS: NORMAL
BACTERIA: NORMAL
BILIRUBIN URINE: NEGATIVE
BILIRUBIN, POC: NORMAL
BLOOD URINE, POC: NORMAL
CASTS UA: NORMAL /LPF (ref 0–8)
CLARITY, POC: CLEAR
COLOR, POC: YELLOW
COLOR: YELLOW
COMMENT UA: ABNORMAL
CRYSTALS, UA: NORMAL /HPF
EPITHELIAL CELLS UA: NORMAL /HPF (ref 0–5)
GLUCOSE URINE, POC: NORMAL
GLUCOSE URINE: NEGATIVE
KETONES, POC: NORMAL
KETONES, URINE: NEGATIVE
LEUKOCYTE EST, POC: NORMAL
LEUKOCYTE ESTERASE, URINE: ABNORMAL
MUCUS: NORMAL
NITRITE, POC: NORMAL
NITRITE, URINE: NEGATIVE
OTHER OBSERVATIONS UA: NORMAL
PH UA: 8 (ref 5–8)
PH, POC: 7
PROTEIN UA: ABNORMAL
PROTEIN, POC: NORMAL
RBC UA: NORMAL /HPF (ref 0–4)
RENAL EPITHELIAL, UA: NORMAL /HPF
SPECIFIC GRAVITY UA: 1.02 (ref 1–1.03)
SPECIFIC GRAVITY, POC: 1.01
TRICHOMONAS: NORMAL
TURBIDITY: ABNORMAL
URINE HGB: NEGATIVE
UROBILINOGEN, POC: 0.2
UROBILINOGEN, URINE: NORMAL
WBC UA: NORMAL /HPF (ref 0–5)
YEAST: NORMAL

## 2019-11-21 PROCEDURE — 81002 URINALYSIS NONAUTO W/O SCOPE: CPT | Performed by: NURSE PRACTITIONER

## 2019-11-21 PROCEDURE — 99214 OFFICE O/P EST MOD 30 MIN: CPT | Performed by: NURSE PRACTITIONER

## 2019-11-22 ENCOUNTER — OFFICE VISIT (OUTPATIENT)
Dept: ONCOLOGY | Age: 74
End: 2019-11-22
Payer: MEDICARE

## 2019-11-22 VITALS
DIASTOLIC BLOOD PRESSURE: 76 MMHG | TEMPERATURE: 97.8 F | SYSTOLIC BLOOD PRESSURE: 113 MMHG | WEIGHT: 212.8 LBS | BODY MASS INDEX: 29.68 KG/M2 | HEART RATE: 63 BPM

## 2019-11-22 DIAGNOSIS — C67.9 MALIGNANT NEOPLASM OF URINARY BLADDER, UNSPECIFIED SITE (HCC): Primary | ICD-10-CM

## 2019-11-22 LAB
CULTURE: NORMAL
Lab: NORMAL
SPECIMEN DESCRIPTION: NORMAL

## 2019-11-22 PROCEDURE — G8482 FLU IMMUNIZE ORDER/ADMIN: HCPCS | Performed by: INTERNAL MEDICINE

## 2019-11-22 PROCEDURE — 1111F DSCHRG MED/CURRENT MED MERGE: CPT | Performed by: INTERNAL MEDICINE

## 2019-11-22 PROCEDURE — G8598 ASA/ANTIPLAT THER USED: HCPCS | Performed by: INTERNAL MEDICINE

## 2019-11-22 PROCEDURE — 1036F TOBACCO NON-USER: CPT | Performed by: INTERNAL MEDICINE

## 2019-11-22 PROCEDURE — 99214 OFFICE O/P EST MOD 30 MIN: CPT | Performed by: INTERNAL MEDICINE

## 2019-11-22 PROCEDURE — 4040F PNEUMOC VAC/ADMIN/RCVD: CPT | Performed by: INTERNAL MEDICINE

## 2019-11-22 PROCEDURE — 3017F COLORECTAL CA SCREEN DOC REV: CPT | Performed by: INTERNAL MEDICINE

## 2019-11-22 PROCEDURE — G8427 DOCREV CUR MEDS BY ELIG CLIN: HCPCS | Performed by: INTERNAL MEDICINE

## 2019-11-22 PROCEDURE — 99211 OFF/OP EST MAY X REQ PHY/QHP: CPT | Performed by: INTERNAL MEDICINE

## 2019-11-22 PROCEDURE — G8417 CALC BMI ABV UP PARAM F/U: HCPCS | Performed by: INTERNAL MEDICINE

## 2019-11-22 PROCEDURE — 1123F ACP DISCUSS/DSCN MKR DOCD: CPT | Performed by: INTERNAL MEDICINE

## 2019-11-22 RX ORDER — ISOSORBIDE MONONITRATE 60 MG/1
60 TABLET, EXTENDED RELEASE ORAL DAILY
Qty: 90 TABLET | Refills: 1 | Status: SHIPPED | OUTPATIENT
Start: 2019-11-22 | End: 2020-05-07 | Stop reason: SDUPTHER

## 2019-11-27 ENCOUNTER — HOSPITAL ENCOUNTER (OUTPATIENT)
Dept: INTERVENTIONAL RADIOLOGY/VASCULAR | Age: 74
Discharge: HOME | End: 2019-11-29
Payer: MEDICARE

## 2019-11-27 VITALS
SYSTOLIC BLOOD PRESSURE: 94 MMHG | RESPIRATION RATE: 14 BRPM | DIASTOLIC BLOOD PRESSURE: 64 MMHG | OXYGEN SATURATION: 92 % | HEART RATE: 58 BPM

## 2019-11-27 DIAGNOSIS — C67.9 MALIGNANT NEOPLASM OF URINARY BLADDER, UNSPECIFIED SITE (HCC): ICD-10-CM

## 2019-11-27 PROCEDURE — 36590 REMOVAL TUNNELED CV CATH: CPT | Performed by: RADIOLOGY

## 2019-11-27 PROCEDURE — 2709999900 IR REMOVE TUNNELED CVAD W SQ PORT/PUMP INSERT

## 2019-11-27 PROCEDURE — 2500000003 HC RX 250 WO HCPCS: Performed by: RADIOLOGY

## 2019-11-27 PROCEDURE — 77001 FLUOROGUIDE FOR VEIN DEVICE: CPT | Performed by: RADIOLOGY

## 2019-11-27 RX ORDER — LIDOCAINE HYDROCHLORIDE 10 MG/ML
INJECTION, SOLUTION INFILTRATION; PERINEURAL
Status: COMPLETED | OUTPATIENT
Start: 2019-11-27 | End: 2019-11-27

## 2019-11-27 RX ADMIN — LIDOCAINE HYDROCHLORIDE 5 ML: 10 INJECTION, SOLUTION INFILTRATION; PERINEURAL at 10:09

## 2019-11-30 PROBLEM — N39.0 UTI (URINARY TRACT INFECTION): Status: RESOLVED | Noted: 2019-10-30 | Resolved: 2019-11-30

## 2019-12-19 ENCOUNTER — OFFICE VISIT (OUTPATIENT)
Dept: UROLOGY | Age: 74
End: 2019-12-19
Payer: MEDICARE

## 2019-12-19 VITALS
HEART RATE: 75 BPM | WEIGHT: 212.74 LBS | DIASTOLIC BLOOD PRESSURE: 72 MMHG | BODY MASS INDEX: 29.78 KG/M2 | HEIGHT: 71 IN | SYSTOLIC BLOOD PRESSURE: 109 MMHG

## 2019-12-19 DIAGNOSIS — C67.1 MALIGNANT NEOPLASM OF DOME OF URINARY BLADDER (HCC): Primary | ICD-10-CM

## 2019-12-19 DIAGNOSIS — Z98.890 HISTORY OF ILEAL CONDUIT: ICD-10-CM

## 2019-12-19 PROCEDURE — G8417 CALC BMI ABV UP PARAM F/U: HCPCS | Performed by: NURSE PRACTITIONER

## 2019-12-19 PROCEDURE — G8598 ASA/ANTIPLAT THER USED: HCPCS | Performed by: NURSE PRACTITIONER

## 2019-12-19 PROCEDURE — 1036F TOBACCO NON-USER: CPT | Performed by: NURSE PRACTITIONER

## 2019-12-19 PROCEDURE — G8482 FLU IMMUNIZE ORDER/ADMIN: HCPCS | Performed by: NURSE PRACTITIONER

## 2019-12-19 PROCEDURE — 4040F PNEUMOC VAC/ADMIN/RCVD: CPT | Performed by: NURSE PRACTITIONER

## 2019-12-19 PROCEDURE — 3017F COLORECTAL CA SCREEN DOC REV: CPT | Performed by: NURSE PRACTITIONER

## 2019-12-19 PROCEDURE — 1123F ACP DISCUSS/DSCN MKR DOCD: CPT | Performed by: NURSE PRACTITIONER

## 2019-12-19 PROCEDURE — 99214 OFFICE O/P EST MOD 30 MIN: CPT | Performed by: NURSE PRACTITIONER

## 2019-12-19 PROCEDURE — G8427 DOCREV CUR MEDS BY ELIG CLIN: HCPCS | Performed by: NURSE PRACTITIONER

## 2019-12-19 ASSESSMENT — ENCOUNTER SYMPTOMS
ABDOMINAL PAIN: 0
WHEEZING: 0
EYE PAIN: 0
NAUSEA: 0
VOMITING: 0
BACK PAIN: 0
CONSTIPATION: 0
EYE REDNESS: 0
DIARRHEA: 0
COUGH: 0
SHORTNESS OF BREATH: 0

## 2019-12-26 ENCOUNTER — OFFICE VISIT (OUTPATIENT)
Dept: UROLOGY | Age: 74
End: 2019-12-26

## 2019-12-26 ENCOUNTER — HOSPITAL ENCOUNTER (OUTPATIENT)
Age: 74
Setting detail: SPECIMEN
Discharge: HOME | End: 2019-12-26
Payer: MEDICARE

## 2019-12-26 VITALS
DIASTOLIC BLOOD PRESSURE: 70 MMHG | HEART RATE: 71 BPM | BODY MASS INDEX: 29.78 KG/M2 | SYSTOLIC BLOOD PRESSURE: 119 MMHG | HEIGHT: 71 IN | WEIGHT: 212.74 LBS

## 2019-12-26 DIAGNOSIS — R89.5 WOUND SWAB CULTURE POSITIVE: ICD-10-CM

## 2019-12-26 DIAGNOSIS — Z98.890 HISTORY OF ILEAL CONDUIT: ICD-10-CM

## 2019-12-26 DIAGNOSIS — T14.8XXA WOUND, OPEN: Primary | ICD-10-CM

## 2019-12-26 DIAGNOSIS — C67.1 MALIGNANT NEOPLASM OF DOME OF URINARY BLADDER (HCC): ICD-10-CM

## 2019-12-26 PROCEDURE — 99024 POSTOP FOLLOW-UP VISIT: CPT | Performed by: NURSE PRACTITIONER

## 2019-12-26 RX ORDER — DOXYCYCLINE 100 MG/1
100 CAPSULE ORAL 2 TIMES DAILY
Qty: 20 CAPSULE | Refills: 0 | Status: SHIPPED | OUTPATIENT
Start: 2019-12-26 | End: 2020-01-07 | Stop reason: ALTCHOICE

## 2019-12-26 ASSESSMENT — ENCOUNTER SYMPTOMS
NAUSEA: 0
EYE REDNESS: 0
ABDOMINAL PAIN: 0
EYE PAIN: 0
SHORTNESS OF BREATH: 0
BACK PAIN: 0
CONSTIPATION: 0
VOMITING: 0
WHEEZING: 0
COUGH: 0
DIARRHEA: 0

## 2019-12-27 LAB
CULTURE: NORMAL
DIRECT EXAM: NORMAL
Lab: NORMAL
SPECIMEN DESCRIPTION: NORMAL

## 2019-12-28 LAB
CULTURE: ABNORMAL
DIRECT EXAM: ABNORMAL
DIRECT EXAM: ABNORMAL
Lab: ABNORMAL
SPECIMEN DESCRIPTION: ABNORMAL

## 2019-12-31 ENCOUNTER — OFFICE VISIT (OUTPATIENT)
Dept: UROLOGY | Age: 74
End: 2019-12-31

## 2019-12-31 VITALS
WEIGHT: 209.2 LBS | HEIGHT: 71 IN | BODY MASS INDEX: 29.29 KG/M2 | SYSTOLIC BLOOD PRESSURE: 100 MMHG | TEMPERATURE: 98.3 F | DIASTOLIC BLOOD PRESSURE: 70 MMHG

## 2019-12-31 DIAGNOSIS — C67.4 MALIGNANT NEOPLASM OF POSTERIOR WALL OF URINARY BLADDER (HCC): Primary | ICD-10-CM

## 2019-12-31 PROCEDURE — 99024 POSTOP FOLLOW-UP VISIT: CPT | Performed by: UROLOGY

## 2020-01-06 RX ORDER — LANCETS 28 GAUGE
EACH MISCELLANEOUS
Qty: 300 EACH | Refills: 1 | Status: CANCELLED | OUTPATIENT
Start: 2020-01-06

## 2020-01-06 RX ORDER — LANCETS 28 GAUGE
EACH MISCELLANEOUS
Qty: 300 EACH | Refills: 1 | Status: SHIPPED | OUTPATIENT
Start: 2020-01-06 | End: 2020-05-07 | Stop reason: SDUPTHER

## 2020-01-06 NOTE — TELEPHONE ENCOUNTER
[facility]   3/23/2020  8:20 AM George Wetzel, JOSE - [facility]            Patient Active Problem List:     AAA (abdominal aortic aneurysm) (HCC)     Hypertension     Hyperlipidemia     OA (osteoarthritis) of hip     Colon polyps     Gout     GERD (gastroesophageal reflux disease)     Oropharyngeal dysphagia     Post laminectomy syndrome     Tobacco use     Ventral hernia     Obese     Chronic back pain sees Dr Steve Scott     DDD (degenerative disc disease), lumbar     Diabetic nephropathy with proteinuria ([address]     Type 2 diabetes with nephropathy (HCC)     Mitral regurgitation     Hx of CABG     Macrocytosis without anemia     Coronary artery disease involving native coronary artery of native heart without angina pectoris     Ear lesion     Chondrodermatitis nodularis chronica helicis     Actinic keratosis     Neoplasm of uncertain behavior of skin     CHF (congestive heart failure) (HCC)     CAD (coronary artery disease)     Obesity (BMI 30-39. 9)     History of congestive heart failure     Central sleep apnea     Malignant neoplasm of urinary bladder (HCC)     Bladder cancer (HCC)     MAVERICK (acute kidney injury) ([address]     Stage 3 chronic kidney disease (HCC)     Acute metabolic encephalopathy

## 2020-01-07 ENCOUNTER — OFFICE VISIT (OUTPATIENT)
Dept: UROLOGY | Age: 75
End: 2020-01-07
Payer: MEDICARE

## 2020-01-07 VITALS
WEIGHT: 209.44 LBS | HEART RATE: 66 BPM | BODY MASS INDEX: 29.32 KG/M2 | SYSTOLIC BLOOD PRESSURE: 111 MMHG | TEMPERATURE: 98 F | HEIGHT: 71 IN | DIASTOLIC BLOOD PRESSURE: 67 MMHG

## 2020-01-07 PROCEDURE — G8482 FLU IMMUNIZE ORDER/ADMIN: HCPCS | Performed by: NURSE PRACTITIONER

## 2020-01-07 PROCEDURE — 1036F TOBACCO NON-USER: CPT | Performed by: NURSE PRACTITIONER

## 2020-01-07 PROCEDURE — 3017F COLORECTAL CA SCREEN DOC REV: CPT | Performed by: NURSE PRACTITIONER

## 2020-01-07 PROCEDURE — 99213 OFFICE O/P EST LOW 20 MIN: CPT | Performed by: NURSE PRACTITIONER

## 2020-01-07 PROCEDURE — 4040F PNEUMOC VAC/ADMIN/RCVD: CPT | Performed by: NURSE PRACTITIONER

## 2020-01-07 PROCEDURE — G8427 DOCREV CUR MEDS BY ELIG CLIN: HCPCS | Performed by: NURSE PRACTITIONER

## 2020-01-07 PROCEDURE — 1123F ACP DISCUSS/DSCN MKR DOCD: CPT | Performed by: NURSE PRACTITIONER

## 2020-01-07 PROCEDURE — G8417 CALC BMI ABV UP PARAM F/U: HCPCS | Performed by: NURSE PRACTITIONER

## 2020-01-07 RX ORDER — DOXYCYCLINE 100 MG/1
100 CAPSULE ORAL 2 TIMES DAILY
Qty: 20 CAPSULE | Refills: 0 | Status: SHIPPED | OUTPATIENT
Start: 2020-01-07 | End: 2020-05-07 | Stop reason: ALTCHOICE

## 2020-01-07 ASSESSMENT — ENCOUNTER SYMPTOMS
BACK PAIN: 0
SHORTNESS OF BREATH: 0
VOMITING: 0
COLOR CHANGE: 0
ABDOMINAL PAIN: 0
WHEEZING: 0
EYE REDNESS: 0
COUGH: 0
EYE PAIN: 0
NAUSEA: 0

## 2020-01-07 NOTE — PATIENT INSTRUCTIONS
will update CT  In the next few days, as prior  planned for later this month     Will continue Doxy for 2 more weeks. Follow up as sched with Dr Jax Alford on the 1/31/2020.

## 2020-01-07 NOTE — PROGRESS NOTES
ONE CAPSULE BY MOUTH DAILY 90 capsule 3    atorvastatin (LIPITOR) 40 MG tablet TAKE ONE TABLET BY MOUTH DAILY 90 tablet 3    allopurinol (ZYLOPRIM) 100 MG tablet TAKE ONE TABLET BY MOUTH DAILY 90 tablet 3    Magnesium Oxide 500 MG TABS Take 500 mg by mouth daily 90 tablet 2    ondansetron (ZOFRAN) 8 MG tablet Take 1 tablet by mouth every 12 hours as needed for Nausea or Vomiting 60 tablet 2    Blood Glucose Monitoring Suppl ROMY Glucometer- Free Style Lite 1 Device 0    Lancet Device MISC 1 Device by Miscell. (Med.Supl.;Non-Drugs) route 3 times daily To check blood sugars. Free Style Lite 1 Device 0       Baclofen; Methadone; and Iodides  Social History     Tobacco Use   Smoking Status Former Smoker    Packs/day: 0.50    Years: 50.00    Pack years: 25.00    Types: Cigarettes    Start date: 1867   Smokeless Tobacco Never Used   Tobacco Comment    quit 2 weeks ago     (Ifpatient a smoker, smoking cessation counseling offered)    Social History     Substance and Sexual Activity   Alcohol Use No       REVIEW OF SYSTEMS:  Review of Systems    Physical Exam:      Vitals:    01/07/20 1029   BP: 111/67   Pulse: 66   Temp: 98 °F (36.7 °C)     Body mass index is 29.22 kg/m². Patient is a 75 y.o. male in no acute distress and alert and oriented to person, place and time. Physical Exam  Constitutional: Patient in no acute distress. Neuro: Alert and oriented to person, place and time. Psych: Mood normal, affect normal  Skin: warm, pink, No rash noted  HEENT: Head: Normocephalic andatraumatic  Conjunctivae and EOM are normal. Pupils are equal, round  Nose:Normal  Right External Ear: Normal; Left External Ear: Normal  Mouth: Mucosa Moist  Neck: Supple  Lungs: Respiratory effort is normal  Cardiovascular: strong and reg, no edema  Abdomen: Soft, non-tender, non-distended. Small open are mid incision, no induration, no erythema. Bladder non-tender and not distended.   Musculoskeletal: Normal gait and station      Assessment and Plan      1. Malignant neoplasm of posterior wall of urinary bladder (HCC)    2. Wound, open           Plan:     will update CT  In the next few days, as prior  planned for later this month     Will continue Doxy for 2 more weeks. Follow up as sched with Dr Gely Ackerman on the 1/31/2020. No follow-ups on file. Prescriptions Ordered:  Orders Placed This Encounter   Medications    doxycycline monohydrate (MONODOX) 100 MG capsule     Sig: Take 1 capsule by mouth 2 times daily     Dispense:  20 capsule     Refill:  0     Orders Placed:  No orders of the defined types were placed in this encounter. JOSE Miguel - JASON    Reviewed and Agree with the ROS entered by the MA.

## 2020-01-08 ENCOUNTER — HOSPITAL ENCOUNTER (OUTPATIENT)
Age: 75
End: 2020-01-08
Payer: MEDICARE

## 2020-01-08 ENCOUNTER — HOSPITAL ENCOUNTER (OUTPATIENT)
Dept: GENERAL RADIOLOGY | Age: 75
Discharge: HOME | End: 2020-01-10
Payer: MEDICARE

## 2020-01-08 ENCOUNTER — HOSPITAL ENCOUNTER (OUTPATIENT)
Age: 75
Discharge: HOME | End: 2020-01-10
Payer: MEDICARE

## 2020-01-08 ENCOUNTER — HOSPITAL ENCOUNTER (OUTPATIENT)
Age: 75
Discharge: HOME | End: 2020-01-08
Payer: MEDICARE

## 2020-01-08 LAB — PROSTATE SPECIFIC ANTIGEN: <0.01 UG/L

## 2020-01-08 PROCEDURE — 71045 X-RAY EXAM CHEST 1 VIEW: CPT

## 2020-01-08 PROCEDURE — 36415 COLL VENOUS BLD VENIPUNCTURE: CPT

## 2020-01-08 PROCEDURE — 84153 ASSAY OF PSA TOTAL: CPT

## 2020-01-09 ENCOUNTER — HOSPITAL ENCOUNTER (OUTPATIENT)
Dept: CT IMAGING | Age: 75
Discharge: HOME | End: 2020-01-11
Payer: MEDICARE

## 2020-01-09 PROCEDURE — 74176 CT ABD & PELVIS W/O CONTRAST: CPT

## 2020-01-22 RX ORDER — POTASSIUM CHLORIDE 20 MEQ/1
TABLET, EXTENDED RELEASE ORAL
Qty: 90 TABLET | Refills: 2 | Status: SHIPPED | OUTPATIENT
Start: 2020-01-22 | End: 2020-05-07 | Stop reason: SDUPTHER

## 2020-02-06 ENCOUNTER — TELEPHONE (OUTPATIENT)
Dept: UROLOGY | Age: 75
End: 2020-02-06

## 2020-02-06 ENCOUNTER — OFFICE VISIT (OUTPATIENT)
Dept: UROLOGY | Age: 75
End: 2020-02-06
Payer: MEDICARE

## 2020-02-06 ENCOUNTER — TELEPHONE (OUTPATIENT)
Dept: PRIMARY CARE CLINIC | Age: 75
End: 2020-02-06

## 2020-02-06 VITALS
DIASTOLIC BLOOD PRESSURE: 82 MMHG | BODY MASS INDEX: 29.91 KG/M2 | SYSTOLIC BLOOD PRESSURE: 126 MMHG | HEIGHT: 71 IN | TEMPERATURE: 97.7 F | WEIGHT: 213.63 LBS

## 2020-02-06 PROCEDURE — 1036F TOBACCO NON-USER: CPT | Performed by: UROLOGY

## 2020-02-06 PROCEDURE — G8427 DOCREV CUR MEDS BY ELIG CLIN: HCPCS | Performed by: UROLOGY

## 2020-02-06 PROCEDURE — 3017F COLORECTAL CA SCREEN DOC REV: CPT | Performed by: UROLOGY

## 2020-02-06 PROCEDURE — 99213 OFFICE O/P EST LOW 20 MIN: CPT | Performed by: UROLOGY

## 2020-02-06 PROCEDURE — G8482 FLU IMMUNIZE ORDER/ADMIN: HCPCS | Performed by: UROLOGY

## 2020-02-06 PROCEDURE — 1123F ACP DISCUSS/DSCN MKR DOCD: CPT | Performed by: UROLOGY

## 2020-02-06 PROCEDURE — G8417 CALC BMI ABV UP PARAM F/U: HCPCS | Performed by: UROLOGY

## 2020-02-06 PROCEDURE — 4040F PNEUMOC VAC/ADMIN/RCVD: CPT | Performed by: UROLOGY

## 2020-02-06 ASSESSMENT — ENCOUNTER SYMPTOMS
ABDOMINAL PAIN: 1
DIARRHEA: 0
COUGH: 0
NAUSEA: 0
CONSTIPATION: 0
WHEEZING: 1
SHORTNESS OF BREATH: 1
EYE PAIN: 0
VOMITING: 0
BACK PAIN: 1
EYE REDNESS: 0

## 2020-02-06 NOTE — TELEPHONE ENCOUNTER
Abi Stevens from [facility] called, patient had his bladder removed and has a wound not healing so referred to wound care. Patient is feeling depressed and hopeless so she advised patient to call our office and schedule appt. Wanted you to be aware of this.

## 2020-02-06 NOTE — TELEPHONE ENCOUNTER
[facility] called the pt will have to go in to get fitted for the binder due to his ostomy.

## 2020-02-06 NOTE — TELEPHONE ENCOUNTER
He understands that and picks up ostomy supplies there. Thank you. Did you ask them if they have the code to answer the phone. Give [facility] number to him because they wont have his code either.

## 2020-02-06 NOTE — PROGRESS NOTES
Review of Systems   Constitutional: Positive for fatigue. Negative for appetite change and chills. Eyes: Negative for pain, redness and visual disturbance. Respiratory: Positive for shortness of breath and wheezing. Negative for cough. Cardiovascular: Negative for chest pain and leg swelling. Gastrointestinal: Positive for abdominal pain. Negative for constipation, diarrhea, nausea and vomiting. Genitourinary: Negative for difficulty urinating, dysuria, flank pain, frequency, hematuria and urgency. Musculoskeletal: Positive for back pain. Negative for joint swelling and myalgias. Skin: Positive for wound. Negative for rash. Neurological: Positive for dizziness and weakness. Negative for numbness. Hematological: Does not bruise/bleed easily.

## 2020-02-06 NOTE — PROGRESS NOTES
[facility]  [facility] - [facility]  [address] [address]  Dept: [address] [facility] Office Note - Established    Patient:  Mannie Lugo  YOB: 1945  Date: 2/6/2020    The patient is a 75 y.o. male who presents todayfor evaluation of the following problems:   Chief Complaint   Patient presents with    Wound Check       HPI  Has wound infection with cont drainage, mrsa. Not healing well and making him depressed, no other issues currently    Summary of old records: N/A    Additional History: N/A    Procedures Today: N/A    Urinalysis today:  No results found for this visit on 02/06/20.   Last several PSA's:  Lab Results   Component Value Date    PSA <0.01 01/08/2020    PSA 0.97 01/05/2018     Last total testosterone:  No results found for: TESTOSTERONE    AUA Symptom Score (2/6/2020):                               Last BUN and creatinine:  Lab Results   Component Value Date    BUN 17 11/04/2019     Lab Results   Component Value Date    CREATININE 1.27 (H) 11/04/2019       Additional Lab/Culture results: none    Imaging Reviewed during this Office Visit: none  (results were independently reviewed by physician and radiology report verified)    PAST MEDICAL, FAMILY AND SOCIAL HISTORY UPDATE:  Past Medical History:   Diagnosis Date    AAA (abdominal aortic aneurysm) ([address]     Dr Chivo Arias, repaired    CAD (coronary artery disease)     Dr Rolf Alex- clearance obtained-patient states    Cancer [facility])     bladder    Carotid artery stenosis     Central sleep apnea 10/16/2017    CHF (congestive heart failure) ([address]     Colon polyps     Dr Vito Dent DDD (degenerative disc disease), lumbar 11/3/2015    Diabetic nephropathy with proteinuria ([address] 11/20/2015    Full dentures     Upper only    GERD (gastroesophageal reflux disease) 7/10/2014    Gout 2/7/2014    Heart attack ([address] 1984    History of blood transfusion     6 weeks agomelania only    Hx of CABG 12/9/2015    Hyperlipidemia     Hypertension     On Rx.     Mitral regurgitation 12/9/2015    Oropharyngeal dysphagia 7/10/2014    Post laminectomy syndrome 9/24/2014    Tobacco use 11/25/2014    Type 2 diabetes with nephropathy ([facility] 11/20/2015    Wears glasses      Past Surgical History:   Procedure Laterality Date    ABDOMINAL ADHESION SURGERY  10/02/2019    LAPAROSCOPIC ROBOTIC LYSIS OF ADHESIONS     ABDOMINAL AORTIC ANEURYSM REPAIR  12/02    ARTERIAL BYPASS SURGRY  1984    Double    BACK SURGERY  2000, 2003    lumbar    BLADDER REMOVAL  10/02/2019    LAPAROSCOPIC ROBOTIC RADICAL CYSTECTOMY, ILEOCONDUIT CREATION     BLADDER REMOVAL N/A 10/2/2019    XI LAPAROSCOPIC ROBOTIC RADICAL CYSTECTOMY, ILEOCONDUIT CREATION performed by Rubi Badillo MD at [place] [address], 12/84    COLONOSCOPY      CORONARY ARTERY BYPASS GRAFT  12/1984    CYSTOSCOPY  02/05/2018    RT RETROGRADE TUR-BT WITH MITOMYCIN INSTILLATION    CYSTOSCOPY N/A 5/21/2019    CYSTOSCOPY TRANSURETHRAL RESECTION BLADDER - CYSTO TUR BLADDER TUMOR performed by Irma Sylvester MD at [address] N/A 6/27/2019    CYSTOSCOPY TRANSURETHRAL RESECTION BLADDER TUMOR performed by Rubi Badillo MD at [address] [facility] Right 2017    cyst removal     HEMICOLECTOMY N/A 10/2/2019    XI LAPAROSCOPIC ROBOTIC LYSIS OF ADHESIONS performed by Tracy Burris IV, DO at [address]      ventral    HIATAL HERNIA REPAIR  12/1980    Hiatal    KNEE ARTHROSCOPY Left 2005    KNEE ARTHROSCOPY Right 2006    KNEE SURGERY Left 2008    MALIGNANT SKIN LESION EXCISION Right 04/27/2017    Dr. Aris Abdul  2004    abdominal muscle repair by Dr. Noemi Carlson Left 2012    NC CYSTOURETHROSCOPY,FULGUR .5-2CM LESN N/A 2/5/2018    CYSTO, BILATERAL RETROGRADE PYELOGRAM, TUR BLADDER TUMOR WITH GYRUS INSTALLATION OF MITOMYCIN performed by Irma Sylvester MD at [place] [address]

## 2020-02-06 NOTE — TELEPHONE ENCOUNTER
Have left message for [facility] pharmacy for abdominal binder to support stoma and script faxed. Called [facility] per pt request to set up wound care. - script faxed to Wound Care. Called PCP office and spoke to Shahab regarding pt expressing feelings of low energy and just not feeling  \"well\" expressed at visit. Asked Jose Armando to call PCP to set up appt- but left message for PCP also to update. Update Delayne Ormond- he has blocks on his phone and I couldn't leave a message.

## 2020-02-07 NOTE — TELEPHONE ENCOUNTER
I spoke with the pts wife and relayed all messages she verbalized understanding and will call with any questions

## 2020-02-09 RX ORDER — ALLOPURINOL 100 MG/1
TABLET ORAL
Qty: 90 TABLET | Refills: 1 | Status: SHIPPED | OUTPATIENT
Start: 2020-02-09 | End: 2020-05-07 | Stop reason: SDUPTHER

## 2020-02-09 RX ORDER — ATENOLOL 25 MG/1
TABLET ORAL
Qty: 90 TABLET | Refills: 1 | Status: SHIPPED | OUTPATIENT
Start: 2020-02-09 | End: 2020-05-07 | Stop reason: SDUPTHER

## 2020-02-09 RX ORDER — ATORVASTATIN CALCIUM 40 MG/1
TABLET, FILM COATED ORAL
Qty: 90 TABLET | Refills: 1 | Status: SHIPPED | OUTPATIENT
Start: 2020-02-09 | End: 2020-05-07 | Stop reason: SDUPTHER

## 2020-02-09 RX ORDER — FUROSEMIDE 80 MG
TABLET ORAL
Qty: 90 TABLET | Refills: 1 | Status: SHIPPED | OUTPATIENT
Start: 2020-02-09 | End: 2020-05-07 | Stop reason: SDUPTHER

## 2020-02-09 NOTE — TELEPHONE ENCOUNTER
Health Maintenance   Topic Date Due    DTaP/Tdap/Td vaccine (1 - Tdap) 11/25/1955    Hepatitis B vaccine (1 of 3 - Risk 3-dose series) 11/25/1963    Shingles Vaccine (2 of 3) 06/25/2015    Diabetic retinal exam  04/12/2017    Diabetic foot exam  02/01/2018    Annual Wellness Visit (AWV)  05/29/2019    Lipid screen  08/01/2019    A1C test (Diabetic or Prediabetic)  10/02/2020    Potassium monitoring  11/04/2020    Creatinine monitoring  11/04/2020    Colon cancer screen colonoscopy  05/13/2021    Flu vaccine  Completed    Pneumococcal 65+ years Vaccine  Completed    Hepatitis A vaccine  Aged Out    Hib vaccine  Aged Out    Meningococcal (ACWY) vaccine  Aged Out             (applicable per patient's age: Cancer Screenings, Depression Screening, Fall Risk Screening, Immunizations)    Hemoglobin A1C (%)   Date Value   10/02/2019 6.5 (H)   07/19/2019 7.0 (H)   02/25/2019 6.3     LDL Cholesterol (mg/dL)   Date Value   08/01/2018 61     LDL Calculated (mg/dL)   Date Value   11/18/2015 74     AST (U/L)   Date Value   10/30/2019 13     ALT (U/L)   Date Value   10/30/2019 5     BUN (mg/dL)   Date Value   11/04/2019 17      (goal A1C is < 7)   (goal LDL is <100) need 30-50% reduction from baseline     BP Readings from Last 3 Encounters:   02/06/20 126/82   01/31/20 109/62   01/07/20 111/67    (goal /80)      All Future Testing planned in [facility]:  Lab Frequency Next Occurrence   Lipid Panel Once 02/25/2020   Comprehensive Metabolic Panel Once 86/14/3931   CBC With Auto Differential Once 09/30/2019   CT ABDOMEN PELVIS WO CONTRAST Additional Contrast? None Once 10/15/2019   XR CHEST 1 VIEW Once 97/94/4601   Basic Metabolic Panel Once 86/50/8313   Urine Culture Once 11/21/2020   CBC With Auto Differential     Comprehensive Metabolic Panel     Magnesium     CBC Auto Differential     Comprehensive Metabolic Panel     PSA, Diagnostic         Next Visit Date:  Future Appointments   Date Time Provider Department

## 2020-02-10 ENCOUNTER — OFFICE VISIT (OUTPATIENT)
Dept: PRIMARY CARE CLINIC | Age: 75
End: 2020-02-10
Payer: MEDICARE

## 2020-02-10 VITALS
WEIGHT: 203.4 LBS | RESPIRATION RATE: 15 BRPM | DIASTOLIC BLOOD PRESSURE: 60 MMHG | HEART RATE: 75 BPM | SYSTOLIC BLOOD PRESSURE: 117 MMHG | HEIGHT: 71 IN | BODY MASS INDEX: 28.48 KG/M2 | OXYGEN SATURATION: 96 %

## 2020-02-10 LAB — HBA1C MFR BLD: 6.2 %

## 2020-02-10 PROCEDURE — G8482 FLU IMMUNIZE ORDER/ADMIN: HCPCS | Performed by: NURSE PRACTITIONER

## 2020-02-10 PROCEDURE — 3044F HG A1C LEVEL LT 7.0%: CPT | Performed by: NURSE PRACTITIONER

## 2020-02-10 PROCEDURE — G0438 PPPS, INITIAL VISIT: HCPCS | Performed by: NURSE PRACTITIONER

## 2020-02-10 PROCEDURE — 4040F PNEUMOC VAC/ADMIN/RCVD: CPT | Performed by: NURSE PRACTITIONER

## 2020-02-10 PROCEDURE — 83036 HEMOGLOBIN GLYCOSYLATED A1C: CPT | Performed by: NURSE PRACTITIONER

## 2020-02-10 PROCEDURE — 3017F COLORECTAL CA SCREEN DOC REV: CPT | Performed by: NURSE PRACTITIONER

## 2020-02-10 PROCEDURE — 1123F ACP DISCUSS/DSCN MKR DOCD: CPT | Performed by: NURSE PRACTITIONER

## 2020-02-10 RX ORDER — RANOLAZINE 500 MG/1
TABLET, EXTENDED RELEASE ORAL
Qty: 180 TABLET | Refills: 1 | Status: SHIPPED | OUTPATIENT
Start: 2020-02-10 | End: 2020-05-07 | Stop reason: SDUPTHER

## 2020-02-10 RX ORDER — LUBIPROSTONE 8 UG/1
8 CAPSULE, GELATIN COATED ORAL DAILY
Qty: 30 CAPSULE | Refills: 3 | Status: SHIPPED | OUTPATIENT
Start: 2020-02-10 | End: 2020-05-07 | Stop reason: ALTCHOICE

## 2020-02-10 RX ORDER — SERTRALINE HYDROCHLORIDE 25 MG/1
25 TABLET, FILM COATED ORAL DAILY
Qty: 30 TABLET | Refills: 3 | Status: SHIPPED | OUTPATIENT
Start: 2020-02-10 | End: 2020-05-07 | Stop reason: SDUPTHER

## 2020-02-10 ASSESSMENT — PATIENT HEALTH QUESTIONNAIRE - PHQ9: SUM OF ALL RESPONSES TO PHQ QUESTIONS 1-9: 17

## 2020-02-10 ASSESSMENT — LIFESTYLE VARIABLES: HOW OFTEN DO YOU HAVE A DRINK CONTAINING ALCOHOL: 0

## 2020-02-10 NOTE — PROGRESS NOTES
[address] MUSCLE REPAIR  2004    abdominal muscle repair by Dr. Luh Vela Left 2012    TX CYSTOURETHROSCOPY,FULGUR .5-2CM LESN N/A 2/5/2018    CYSTO, BILATERAL RETROGRADE PYELOGRAM, TUR BLADDER TUMOR WITH GYRUS INSTALLATION OF MITOMYCIN performed by Susie Montoya MD at [address] TUMOR REMOVAL Right 2009    neck, benign    VENTRAL HERNIA REPAIR Left 10/1997    Ventral    WRIST GANGLION EXCISION Left 2008         Family History   Problem Relation Age of Onset    Breast Cancer Mother     High Blood Pressure Father     Heart Disease Father     Stroke Father     Heart Disease Brother     Heart Surgery Brother         Bypass       CareTeam (Including outside providers/suppliers regularly involved in providing care):   Patient Care Team:  JOSE Miller CNP as PCP - General (Nurse Practitioner)  JOSE Miller CNP as PCP - [facility]  Pedro Coyle MD as Surgeon (Otolaryngology)  Brady Schuster MD as Anesthesiologist (Anesthesiology)  Viktoria Jamil MD as Consulting Physician (Cardiology)  Sylvia Ervin MD as Surgeon  Arben Bernardo (Optometry)  Tung Randall MD as Consulting Physician (Plastic Surgery)  Susie Montoya MD as Consulting Physician (Urology)  Ben Vasquez RN as Nurse Navigator (Oncology)  Ben Vasquez RN as Nurse Navigator (Oncology)  Ben Vasquez RN as Nurse Navigator (Oncology)    Wt Readings from Last 3 Encounters:   02/10/20 203 lb 6.4 oz (92.3 kg)   02/06/20 213 lb 10 oz (96.9 kg)   01/31/20 213 lb 9.6 oz (96.9 kg)     Vitals:    02/10/20 1104   BP: 117/60   Pulse: 75   Resp: 15   SpO2: 96%   Weight: 203 lb 6.4 oz (92.3 kg)   Height: 5' 11\" (1.803 m)     Body mass index is 28.37 kg/m². Based upon direct observation of the patient, evaluation of cognition reveals recent and remote memory intact.     General Appearance: alert and oriented to person, place and time, A[place]uria) 10/31/2019    Pneumococcal Conjugate 13-valent (Noxgtol55) 02/01/2017    Pneumococcal Conjugate 7-valent (Prevnar7) 01/01/2008    Pneumococcal Polysaccharide ([id number]) 08/22/2018    Zoster Live (Zostavax) 01/01/2012, 04/30/2015        Health Maintenance   Topic Date Due    DTaP/Tdap/Td vaccine (1 - Tdap) 11/25/1955    Hepatitis B vaccine (1 of 3 - Risk 3-dose series) 11/25/1963    Shingles Vaccine (2 of 3) 06/25/2015    Diabetic retinal exam  04/12/2017    Diabetic foot exam  02/01/2018    Annual Wellness Visit (AWV)  05/29/2019    Lipid screen  08/01/2019    A1C test (Diabetic or Prediabetic)  10/02/2020    Potassium monitoring  11/04/2020    Creatinine monitoring  11/04/2020    Colon cancer screen colonoscopy  05/13/2021    Flu vaccine  Completed    Pneumococcal 65+ years Vaccine  Completed    Hepatitis A vaccine  Aged Out    Hib vaccine  Aged Out    Meningococcal (ACWY) vaccine  Aged Out     Recommendations for [employer] Due: see orders and patient instructions/AVS.  . Recommended screening schedule for the next 5-10 years is provided to the patient in written form: see Patient Instructions/AVS.    Randolph Núñez was seen today for medicare awv. Diagnoses and all orders for this visit:    Encounter for general adult medical examination with abnormal findings    Coronary artery disease involving native heart, angina presence unspecified, unspecified vessel or lesion type  -     ranolazine (RANEXA) 500 MG extended release tablet; TAKE ONE TABLET BY MOUTH TWICE A DAY    Type 2 diabetes with nephropathy (HCC)  -     POCT glycosylated hemoglobin (Hb A1C)    Change in stool  -     AFL - Prudencio Petersen DO, Gastroenterology, [place]    Drug-induced constipation    Depression with anxiety    Other orders  -     Discontinue: linaclotide (LINZESS) 145 MCG capsule; Take 1 capsule by mouth every morning (before breakfast)  -     sertraline (ZOLOFT) 25 MG tablet;  Take 1 tablet by mouth daily  - lubiprostone (AMITIZA) 8 MCG CAPS capsule; Take 1 capsule by mouth daily  -     metFORMIN (GLUCOPHAGE) 500 MG tablet; Take 1 tablet by mouth 2 times daily (with meals)        Presents with wife for AWV    Has been having a lot to deal with lately  Has follow up with Dr. Aliya Bourgeois tomorrow to discuss surgical incision that it is non healing  This has been really hard for him to deal with, it feels like it will never end  Would like to discuss starting a medication for depression    C/o changes in bowels  Severe constipation that is worsening  Will try prescription rx  Also has noted change in color of stool-much darker.  States he felt it was related to eating oreos for breakfast  Will refer to gi for eval    Hga1c 6.2  Has had decrease in weight  Will reduce metformin dose    Denies any other problems/concerns    Follow up in one month for recheck

## 2020-02-19 ENCOUNTER — HOSPITAL ENCOUNTER (OUTPATIENT)
Age: 75
End: 2020-02-19

## 2020-02-19 ENCOUNTER — TELEPHONE (OUTPATIENT)
Dept: ONCOLOGY | Age: 75
End: 2020-02-19

## 2020-02-19 ENCOUNTER — OFFICE VISIT (OUTPATIENT)
Dept: ONCOLOGY | Age: 75
End: 2020-02-19
Payer: MEDICARE

## 2020-02-19 ENCOUNTER — HOSPITAL ENCOUNTER (OUTPATIENT)
Age: 75
Discharge: HOME | End: 2020-02-19
Payer: MEDICARE

## 2020-02-19 VITALS
SYSTOLIC BLOOD PRESSURE: 111 MMHG | RESPIRATION RATE: 16 BRPM | DIASTOLIC BLOOD PRESSURE: 69 MMHG | HEART RATE: 56 BPM | BODY MASS INDEX: 29.48 KG/M2 | TEMPERATURE: 97.8 F | WEIGHT: 211.4 LBS

## 2020-02-19 DIAGNOSIS — C61 PROSTATE CANCER (HCC): ICD-10-CM

## 2020-02-19 DIAGNOSIS — C67.9 MALIGNANT NEOPLASM OF URINARY BLADDER, UNSPECIFIED SITE (HCC): ICD-10-CM

## 2020-02-19 LAB
ABSOLUTE EOS #: 0.33 K/UL (ref 0–0.4)
ABSOLUTE IMMATURE GRANULOCYTE: ABNORMAL K/UL (ref 0–0.3)
ABSOLUTE LYMPH #: 1.45 K/UL (ref 1–4.8)
ABSOLUTE MONO #: 0.53 K/UL (ref 0.1–1.2)
ALBUMIN SERPL-MCNC: 3.8 G/DL (ref 3.5–5.2)
ALBUMIN/GLOBULIN RATIO: 1.1 (ref 1–2.5)
ALP BLD-CCNC: 69 U/L (ref 40–129)
ALT SERPL-CCNC: <5 U/L (ref 5–41)
ANION GAP SERPL CALCULATED.3IONS-SCNC: 13 MMOL/L (ref 9–17)
AST SERPL-CCNC: 10 U/L
BASOPHILS # BLD: 1 % (ref 0–2)
BASOPHILS ABSOLUTE: 0.07 K/UL (ref 0–0.2)
BILIRUB SERPL-MCNC: 0.3 MG/DL (ref 0.3–1.2)
BUN BLDV-MCNC: 30 MG/DL (ref 8–23)
BUN/CREAT BLD: ABNORMAL (ref 9–20)
CALCIUM SERPL-MCNC: 9.3 MG/DL (ref 8.6–10.4)
CHLORIDE BLD-SCNC: 99 MMOL/L (ref 98–107)
CO2: 25 MMOL/L (ref 20–31)
CREAT SERPL-MCNC: 1.59 MG/DL (ref 0.7–1.2)
DIFFERENTIAL TYPE: ABNORMAL
EOSINOPHILS RELATIVE PERCENT: 5 % (ref 1–4)
GFR AFRICAN AMERICAN: 52 ML/MIN
GFR NON-AFRICAN AMERICAN: 43 ML/MIN
GFR SERPL CREATININE-BSD FRML MDRD: ABNORMAL ML/MIN/{1.73_M2}
GFR SERPL CREATININE-BSD FRML MDRD: ABNORMAL ML/MIN/{1.73_M2}
GLUCOSE BLD-MCNC: 141 MG/DL (ref 70–99)
HCT VFR BLD CALC: 31.1 % (ref 41–53)
HEMOGLOBIN: 10.2 G/DL (ref 13.5–17.5)
IMMATURE GRANULOCYTES: ABNORMAL %
LYMPHOCYTES # BLD: 22 % (ref 24–44)
MCH RBC QN AUTO: 34.6 PG (ref 26–34)
MCHC RBC AUTO-ENTMCNC: 32.7 G/DL (ref 31–37)
MCV RBC AUTO: 105.7 FL (ref 80–100)
MONOCYTES # BLD: 8 % (ref 2–11)
MORPHOLOGY: ABNORMAL
NRBC AUTOMATED: ABNORMAL PER 100 WBC
PDW BLD-RTO: 20.5 % (ref 12.5–15.4)
PLATELET # BLD: 228 K/UL (ref 140–450)
PLATELET ESTIMATE: ABNORMAL
PMV BLD AUTO: 7.7 FL (ref 6–12)
POTASSIUM SERPL-SCNC: 4.7 MMOL/L (ref 3.7–5.3)
RBC # BLD: 2.94 M/UL (ref 4.5–5.9)
RBC # BLD: ABNORMAL 10*6/UL
SEG NEUTROPHILS: 64 % (ref 36–66)
SEGMENTED NEUTROPHILS ABSOLUTE COUNT: 4.22 K/UL (ref 1.8–7.7)
SODIUM BLD-SCNC: 137 MMOL/L (ref 135–144)
TOTAL PROTEIN: 7.2 G/DL (ref 6.4–8.3)
WBC # BLD: 6.6 K/UL (ref 3.5–11)
WBC # BLD: ABNORMAL 10*3/UL

## 2020-02-19 PROCEDURE — 99211 OFF/OP EST MAY X REQ PHY/QHP: CPT | Performed by: INTERNAL MEDICINE

## 2020-02-19 PROCEDURE — 85025 COMPLETE CBC W/AUTO DIFF WBC: CPT

## 2020-02-19 PROCEDURE — 3017F COLORECTAL CA SCREEN DOC REV: CPT | Performed by: INTERNAL MEDICINE

## 2020-02-19 PROCEDURE — 4004F PT TOBACCO SCREEN RCVD TLK: CPT | Performed by: INTERNAL MEDICINE

## 2020-02-19 PROCEDURE — G8482 FLU IMMUNIZE ORDER/ADMIN: HCPCS | Performed by: INTERNAL MEDICINE

## 2020-02-19 PROCEDURE — 1123F ACP DISCUSS/DSCN MKR DOCD: CPT | Performed by: INTERNAL MEDICINE

## 2020-02-19 PROCEDURE — 4040F PNEUMOC VAC/ADMIN/RCVD: CPT | Performed by: INTERNAL MEDICINE

## 2020-02-19 PROCEDURE — 36415 COLL VENOUS BLD VENIPUNCTURE: CPT

## 2020-02-19 PROCEDURE — G8417 CALC BMI ABV UP PARAM F/U: HCPCS | Performed by: INTERNAL MEDICINE

## 2020-02-19 PROCEDURE — G8427 DOCREV CUR MEDS BY ELIG CLIN: HCPCS | Performed by: INTERNAL MEDICINE

## 2020-02-19 PROCEDURE — 80053 COMPREHEN METABOLIC PANEL: CPT

## 2020-02-19 PROCEDURE — 99214 OFFICE O/P EST MOD 30 MIN: CPT | Performed by: INTERNAL MEDICINE

## 2020-02-19 NOTE — PROGRESS NOTES
cancer. He reports in the interim he developed staph infection at surgical site which included some infected mesh, he is following with wound care. His move to [facility] has been delayed due to his medical complications. He continues to follow with urology and surgeon. He notes swelling in the feet and ankles over the course of the day, resolved in the evening with elevation. During this visit patient's allergy, social, medical, surgical history and medications were reviewed and updated. PAST MEDICAL HISTORY: has a past medical history of AAA (abdominal aortic aneurysm) ([address] CAD (coronary artery disease), Cancer ([address] Carotid artery stenosis, Central sleep apnea, CHF (congestive heart failure) ([address] Colon polyps, DDD (degenerative disc disease), lumbar, Diabetic nephropathy with proteinuria ([address] Full dentures, GERD (gastroesophageal reflux disease), Gout, Heart attack ([address] History of blood transfusion, Hx of CABG, Hyperlipidemia, Hypertension, Mitral regurgitation, Oropharyngeal dysphagia, Post laminectomy syndrome, Tobacco use, Type 2 diabetes with nephropathy ([address] and Wears glasses. PAST SURGICAL HISTORY: has a past surgical history that includes Coronary artery bypass graft (12/1984); back surgery (2000, 2003); Abdominal aortic aneurysm repair (12/02); Wrist ganglion excision (Left, 2008); tumor removal (Right, 2009); Colonoscopy; Arterial bypass surgry (1984); knee surgery (Left, 2008); Parotidectomy (Left, 2012); malignant skin lesion excision (Right, 04/27/2017); hiatal hernia repair (12/1980); ventral hernia repair (Left, 10/1997); Knee arthroscopy (Left, 2005); Knee arthroscopy (Right, 2006); External ear surgery (Right, 2017); Muscle Repair (2004); Cystocopy (02/05/2018); pr cystourethroscopy,fulgur . 5-2cm lesn (N/A, 2/5/2018); Cardiac surgery; hernia repair; Cystoscopy (N/A, 5/21/2019); Cystoscopy (N/A, 6/27/2019); skin biopsy; Bladder removal (10/02/2019);  Abdominal adhesion surgery (10/02/2019); Bladder removal (N/A, 10/2/2019); and hemicolectomy (N/A, 10/2/2019). CURRENT MEDICATIONS:  has a current medication list which includes the following prescription(s): ranolazine, sertraline, lubiprostone, metformin, atorvastatin, furosemide, allopurinol, atenolol, potassium chloride, doxycycline monohydrate, freestyle lancets, isosorbide mononitrate, magic mouthwash, blood glucose test strips, lidocaine-prilocaine, prochlorperazine, therapeutic multivitamin-minerals, morphine, lansoprazole, magnesium oxide, ondansetron, blood glucose monitor kit and supplies, lancet device, and dulera, and the following Facility-Administered Medications: sodium chloride flush. ALLERGIES:  is allergic to baclofen; methadone; and iodides. FAMILY HISTORY: Negative for any hematological or oncological conditions. SOCIAL HISTORY:  reports that he has been smoking cigarettes. He started smoking about 53 years ago. He has a 25.00 pack-year smoking history. He has never used smokeless tobacco. He reports that he does not drink alcohol or use drugs. REVIEW OF SYSTEMS:     General: no fever or night sweats, positive fatigue  ENT: No double or blurred vision, no hearing problem, no dysphagia or sore throat   Respiratory: No chest pain, no shortness of breath, no cough or hemoptysis. Cardiovascular: Denies chest pain, PND or orthopnea, or palpitations. +foot and ankle swelling as noted above  Gastrointestinal:    No nausea or vomiting, abdominal pain, diarrhea or constipation. Genitourinary: Denies dysuria, hematuria, frequency, urgency or incontinence. Neurological: Denies headaches, decreased LOC, no sensory or motor focal deficits. Musculoskeletal:  No arthralgia no back pain or joint swelling. Skin: There are no rashes or bleeding. Psych: Denies hallucinations or intentions to harm self      PHYSICAL EXAM: Shows a well appearing 75y.o.-year-old male who is not in pain or distress.  Vital Signs: Blood pressure 111/69, pulse 56, temperature 97.8 °F (36.6 °C), temperature source Oral, resp. rate 16, weight 211 lb 6.4 oz (95.9 kg). HEENT: Normocephalic and atraumatic. Pupils are equal, round, reactive to light and accommodation. Extraocular muscles are intact. Neck: Showed no JVD, no carotid bruit . Lungs: Clear to auscultation bilaterally. Heart: Regular without any murmur. Abdomen: Soft, nontender. No hepatosplenomegaly. Extremities: Lower extremities show no edema, clubbing, or cyanosis. Breasts: Examination not done today. Neuro exam: intact cranial nerves bilaterally no motor or sensory deficit, gait is normal. Lymphatic: no adenopathy appreciated in the supraclavicular, axillary, cervical or inguinal area    REVIEW OF LABORATORY DATA:   Lab Results   Component Value Date    WBC 6.6 02/19/2020    HGB 10.2 (L) 02/19/2020    HCT 31.1 (L) 02/19/2020    .7 (H) 02/19/2020     02/19/2020       Chemistry        Component Value Date/Time     11/04/2019 1051    K 3.6 (L) 11/04/2019 1051     (H) 11/04/2019 1051    CO2 19 (L) 11/04/2019 1051    BUN 17 11/04/2019 1051    CREATININE 1.27 (H) 11/04/2019 1051        Component Value Date/Time    CALCIUM 8.9 11/04/2019 1051    ALKPHOS 59 10/30/2019 1149    AST 13 10/30/2019 1149    ALT 5 10/30/2019 1149    BILITOT 0.70 10/30/2019 1149        Lab Results   Component Value Date    PSA <0.01 01/08/2020    PSA 0.97 01/05/2018     CT chest abdomen pelvis 1/2020    Impression   1.  Within the chest, advanced emphysematous changes are noted with fibrotic   change, bronchiectasis and honeycomb type appearance.       2. Shotty to borderline sized mediastinal lymph nodes up to 9 mm, stable.       3.  Interval performance of cystectomy and ileal loop procedure without   evidence of postoperative complication.  No urinary obstruction is noted.    Prior mild hydronephrosis has resolved.  Status post prostatectomy.  Prior   pelvic seroma has resolved.

## 2020-02-24 NOTE — TELEPHONE ENCOUNTER
Metabolic Panel     Magnesium     CBC Auto Differential     Comprehensive Metabolic Panel     PSA, Diagnostic     CBC Auto Differential     Comprehensive Metabolic Panel         Next Visit Date:  Future Appointments   Date Time Provider Department Center   3/23/2020  8:20 AM JOSE Harmon CNP [facility]   5/6/2020 10:00 AM [facility] [place] [facility] [place]   5/13/2020 10:40 AM Zenaida Primrose,  [facility]            Patient Active Problem List:     AAA (abdominal aortic aneurysm) (HCC)     Hypertension     Hyperlipidemia     OA (osteoarthritis) of hip     Colon polyps     Gout     GERD (gastroesophageal reflux disease)     Oropharyngeal dysphagia     Post laminectomy syndrome     Tobacco use     Ventral hernia     Obese     Chronic back pain sees Dr Anna Arcos     DDD (degenerative disc disease), lumbar     Diabetic nephropathy with proteinuria ([address]     Type 2 diabetes with nephropathy (HCC)     Mitral regurgitation     Hx of CABG     Macrocytosis without anemia     Coronary artery disease involving native coronary artery of native heart without angina pectoris     Ear lesion     Chondrodermatitis nodularis chronica helicis     Actinic keratosis     Neoplasm of uncertain behavior of skin     CHF (congestive heart failure) (HCC)     CAD (coronary artery disease)     Obesity (BMI 30-39. 9)     History of congestive heart failure     Central sleep apnea     Malignant neoplasm of urinary bladder (HCC)     Bladder cancer (HCC)     MAVERICK (acute kidney injury) ([address]     Stage 3 chronic kidney disease (HCC)     Acute metabolic encephalopathy

## 2020-03-05 ENCOUNTER — TELEPHONE (OUTPATIENT)
Dept: ONCOLOGY | Age: 75
End: 2020-03-05

## 2020-03-06 ENCOUNTER — TELEPHONE (OUTPATIENT)
Dept: PRIMARY CARE CLINIC | Age: 75
End: 2020-03-06

## 2020-03-06 NOTE — TELEPHONE ENCOUNTER
Patient called, wound care started him on Sulfamethoxazole tmp ds 800/160 and per wound care he will probably be on it a long time. Pharmacist told him it could raise his potassium and he is concerned about potassium getting too high since he is already taking potassium daily. He is asking if he should cut potassium dose in half. Please advise.

## 2020-03-09 RX ORDER — LANSOPRAZOLE 30 MG/1
CAPSULE, DELAYED RELEASE ORAL
Qty: 90 CAPSULE | Refills: 2 | Status: SHIPPED | OUTPATIENT
Start: 2020-03-09 | End: 2020-05-07 | Stop reason: SDUPTHER

## 2020-03-09 NOTE — TELEPHONE ENCOUNTER
LAST OV 2/10/20    Health Maintenance   Topic Date Due    Hepatitis B vaccine (1 of 3 - Risk 3-dose series) 11/25/1963    DTaP/Tdap/Td vaccine (1 - Tdap) 11/25/1963    Shingles Vaccine (2 of 3) 06/25/2015    Diabetic retinal exam  04/12/2017    Diabetic foot exam  02/01/2018    Lipid screen  08/01/2019    PSA counseling  01/08/2021    A1C test (Diabetic or Prediabetic)  02/10/2021    Annual Wellness Visit (AWV)  02/10/2021    Potassium monitoring  02/19/2021    Creatinine monitoring  02/19/2021    Colon cancer screen colonoscopy  05/13/2021    Flu vaccine  Completed    Pneumococcal 65+ years Vaccine  Completed    Hepatitis A vaccine  Aged Out    Hib vaccine  Aged Out    Meningococcal (ACWY) vaccine  Aged Out             (applicable per patient's age: Cancer Screenings, Depression Screening, Fall Risk Screening, Immunizations)    Hemoglobin A1C (%)   Date Value   02/10/2020 6.2   10/02/2019 6.5 (H)   07/19/2019 7.0 (H)     LDL Cholesterol (mg/dL)   Date Value   08/01/2018 61     LDL Calculated (mg/dL)   Date Value   11/18/2015 74     AST (U/L)   Date Value   02/19/2020 10     ALT (U/L)   Date Value   02/19/2020 <5 (L)     BUN (mg/dL)   Date Value   02/19/2020 30 (H)      (goal A1C is < 7)   (goal LDL is <100) need 30-50% reduction from baseline     BP Readings from Last 3 Encounters:   02/19/20 111/69   02/10/20 117/60   02/06/20 126/82    (goal /80)      All Future Testing planned in [facility]:  Lab Frequency Next Occurrence   Comprehensive Metabolic Panel Once 15/53/2625   CBC With Auto Differential Once 09/30/2019   CT ABDOMEN PELVIS WO CONTRAST Additional Contrast? None Once 10/15/2019   XR CHEST 1 VIEW Once 63/36/8675   Basic Metabolic Panel Once 55/04/3052   Urine Culture Once 11/21/2020   CT UROGRAM Once 02/19/2020   CT CHEST W CONTRAST Once 02/19/2020   CT CHEST ABDOMEN PELVIS WO CONTRAST Once 64/51/9078   Basic Metabolic Panel Once 44/39/2333   CBC With Auto Differential Comprehensive Metabolic Panel     Magnesium     CBC Auto Differential     Comprehensive Metabolic Panel     PSA, Diagnostic     CBC Auto Differential     Comprehensive Metabolic Panel         Next Visit Date:  Future Appointments   Date Time Provider Department Center   3/23/2020  8:20 AM JOSE Fletcher - CNP [facility]   5/6/2020 10:00 AM [facility] [place] [facility] [place]   5/13/2020 10:40 AM Mily Bazan  [facility]            Patient Active Problem List:     AAA (abdominal aortic aneurysm) (HCC)     Hypertension     Hyperlipidemia     OA (osteoarthritis) of hip     Colon polyps     Gout     GERD (gastroesophageal reflux disease)     Oropharyngeal dysphagia     Post laminectomy syndrome     Tobacco use     Ventral hernia     Obese     Chronic back pain sees Dr Zainab Watkins     DDD (degenerative disc disease), lumbar     Diabetic nephropathy with proteinuria ([address]     Type 2 diabetes with nephropathy (HCC)     Mitral regurgitation     Hx of CABG     Macrocytosis without anemia     Coronary artery disease involving native coronary artery of native heart without angina pectoris     Ear lesion     Chondrodermatitis nodularis chronica helicis     Actinic keratosis     Neoplasm of uncertain behavior of skin     CHF (congestive heart failure) (HCC)     CAD (coronary artery disease)     Obesity (BMI 30-39. 9)     History of congestive heart failure     Central sleep apnea     Malignant neoplasm of urinary bladder (HCC)     Bladder cancer (HCC)     MAVERICK (acute kidney injury) ([address]     Stage 3 chronic kidney disease (HCC)     Acute metabolic encephalopathy

## 2020-03-19 RX ORDER — CAPTOPRIL 25 MG/1
TABLET ORAL
Qty: 270 TABLET | Refills: 2 | Status: SHIPPED | OUTPATIENT
Start: 2020-03-19 | End: 2020-05-07 | Stop reason: SDUPTHER

## 2020-03-19 NOTE — TELEPHONE ENCOUNTER
Health Maintenance   Topic Date Due    Hepatitis B vaccine (1 of 3 - Risk 3-dose series) 11/25/1963    DTaP/Tdap/Td vaccine (1 - Tdap) 11/25/1963    Shingles Vaccine (2 of 3) 06/25/2015    Diabetic retinal exam  04/12/2017    Diabetic foot exam  02/01/2018    Lipid screen  08/01/2019    PSA counseling  01/08/2021    A1C test (Diabetic or Prediabetic)  02/10/2021    Annual Wellness Visit (AWV)  02/10/2021    Potassium monitoring  02/19/2021    Creatinine monitoring  02/19/2021    Colon cancer screen colonoscopy  05/13/2021    Flu vaccine  Completed    Pneumococcal 65+ years Vaccine  Completed    Hepatitis A vaccine  Aged Out    Hib vaccine  Aged Out    Meningococcal (ACWY) vaccine  Aged Out             (applicable per patient's age: Cancer Screenings, Depression Screening, Fall Risk Screening, Immunizations)    Hemoglobin A1C (%)   Date Value   02/10/2020 6.2   10/02/2019 6.5 (H)   07/19/2019 7.0 (H)     LDL Cholesterol (mg/dL)   Date Value   08/01/2018 61     LDL Calculated (mg/dL)   Date Value   11/18/2015 74     AST (U/L)   Date Value   02/19/2020 10     ALT (U/L)   Date Value   02/19/2020 <5 (L)     BUN (mg/dL)   Date Value   02/19/2020 30 (H)      (goal A1C is < 7)   (goal LDL is <100) need 30-50% reduction from baseline     BP Readings from Last 3 Encounters:   02/19/20 111/69   02/10/20 117/60   02/06/20 126/82    (goal /80)      All Future Testing planned in [facility]:  Lab Frequency Next Occurrence   Comprehensive Metabolic Panel Once 43/77/1477   CBC With Auto Differential Once 09/30/2019   CT ABDOMEN PELVIS WO CONTRAST Additional Contrast? None Once 10/15/2019   XR CHEST 1 VIEW Once 38/15/6562   Basic Metabolic Panel Once 68/61/2718   Urine Culture Once 11/21/2020   CT UROGRAM Once 02/19/2020   CT CHEST W CONTRAST Once 02/19/2020   CT CHEST ABDOMEN PELVIS WO CONTRAST Once 88/30/2578   Basic Metabolic Panel Once 67/97/6745   CBC With Auto Differential     Comprehensive Metabolic Panel Magnesium     CBC Auto Differential     Comprehensive Metabolic Panel     PSA, Diagnostic     CBC Auto Differential     Comprehensive Metabolic Panel         Next Visit Date:  Future Appointments   Date Time Provider Diandra Horton   5/6/2020 10:00 AM [facility] [place] [facility] RM [facility] CT [facility] [place]   5/13/2020 10:45 AM Arnie Euceda MD [address] [place] [address]        Last visit: 2/10/2020    Patient Active Problem List:     AAA (abdominal aortic aneurysm) (HCC)     Hypertension     Hyperlipidemia     OA (osteoarthritis) of hip     Colon polyps     Gout     GERD (gastroesophageal reflux disease)     Oropharyngeal dysphagia     Post laminectomy syndrome     Tobacco use     Ventral hernia     Obese     Chronic back pain sees Dr Hugh Prince     DDD (degenerative disc disease), lumbar     Diabetic nephropathy with proteinuria ([address]     Type 2 diabetes with nephropathy (HCC)     Mitral regurgitation     Hx of CABG     Macrocytosis without anemia     Coronary artery disease involving native coronary artery of native heart without angina pectoris     Ear lesion     Chondrodermatitis nodularis chronica helicis     Actinic keratosis     Neoplasm of uncertain behavior of skin     CHF (congestive heart failure) (HCC)     CAD (coronary artery disease)     Obesity (BMI 30-39. 9)     History of congestive heart failure     Central sleep apnea     Malignant neoplasm of urinary bladder (HCC)     Bladder cancer (HCC)     MAVERICK (acute kidney injury) ([address]     Stage 3 chronic kidney disease (HCC)     Acute metabolic encephalopathy

## 2020-04-20 NOTE — TELEPHONE ENCOUNTER
Last OV 02/10/2020  Health Maintenance   Topic Date Due    DTaP/Tdap/Td vaccine (1 - Tdap) 11/25/1963    Shingles Vaccine (2 of 3) 06/25/2015    Diabetic retinal exam  04/12/2017    Diabetic foot exam  02/01/2018    Lipid screen  08/01/2019    PSA counseling  01/08/2021    A1C test (Diabetic or Prediabetic)  02/10/2021    Annual Wellness Visit (AWV)  02/10/2021    Potassium monitoring  02/19/2021    Creatinine monitoring  02/19/2021    Colon cancer screen colonoscopy  05/13/2021    Flu vaccine  Completed    Pneumococcal 65+ years Vaccine  Completed    Hepatitis A vaccine  Aged Out    Hib vaccine  Aged Out    Meningococcal (ACWY) vaccine  Aged Out             (applicable per patient's age: Cancer Screenings, Depression Screening, Fall Risk Screening, Immunizations)    Hemoglobin A1C (%)   Date Value   02/10/2020 6.2   10/02/2019 6.5 (H)   07/19/2019 7.0 (H)     LDL Cholesterol (mg/dL)   Date Value   08/01/2018 61     LDL Calculated (mg/dL)   Date Value   11/18/2015 74     AST (U/L)   Date Value   02/19/2020 10     ALT (U/L)   Date Value   02/19/2020 <5 (L)     BUN (mg/dL)   Date Value   02/19/2020 30 (H)      (goal A1C is < 7)   (goal LDL is <100) need 30-50% reduction from baseline     BP Readings from Last 3 Encounters:   02/19/20 111/69   02/10/20 117/60   02/06/20 126/82    (goal /80)      All Future Testing planned in [facility]:  Lab Frequency Next Occurrence   Comprehensive Metabolic Panel Once 86/21/6832   CBC With Auto Differential Once 09/30/2019   CT ABDOMEN PELVIS WO CONTRAST Additional Contrast? None Once 10/15/2019   XR CHEST 1 VIEW Once 26/27/9018   Basic Metabolic Panel Once 40/67/9978   Urine Culture Once 11/21/2020   CT UROGRAM Once 02/19/2020   CT CHEST W CONTRAST Once 02/19/2020   CT CHEST ABDOMEN PELVIS WO CONTRAST Once 83/80/8614   Basic Metabolic Panel Once 24/03/8844   CBC With Auto Differential     Comprehensive Metabolic Panel     Magnesium     CBC Auto Differential

## 2020-04-22 RX ORDER — BLOOD-GLUCOSE METER
KIT MISCELLANEOUS
Qty: 100 STRIP | Refills: 4 | Status: SHIPPED | OUTPATIENT
Start: 2020-04-22 | End: 2020-04-24 | Stop reason: SDUPTHER

## 2020-04-22 NOTE — TELEPHONE ENCOUNTER
Health Maintenance   Topic Date Due    DTaP/Tdap/Td vaccine (1 - Tdap) 11/25/1963    Shingles Vaccine (2 of 3) 06/25/2015    Diabetic retinal exam  04/12/2017    Diabetic foot exam  02/01/2018    Lipid screen  08/01/2019    PSA counseling  01/08/2021    A1C test (Diabetic or Prediabetic)  02/10/2021    Annual Wellness Visit (AWV)  02/10/2021    Potassium monitoring  02/19/2021    Creatinine monitoring  02/19/2021    Colon cancer screen colonoscopy  05/13/2021    Flu vaccine  Completed    Pneumococcal 65+ years Vaccine  Completed    Hepatitis A vaccine  Aged Out    Hib vaccine  Aged Out    Meningococcal (ACWY) vaccine  Aged Out             (applicable per patient's age: Cancer Screenings, Depression Screening, Fall Risk Screening, Immunizations)    Hemoglobin A1C (%)   Date Value   02/10/2020 6.2   10/02/2019 6.5 (H)   07/19/2019 7.0 (H)     LDL Cholesterol (mg/dL)   Date Value   08/01/2018 61     LDL Calculated (mg/dL)   Date Value   11/18/2015 74     AST (U/L)   Date Value   02/19/2020 10     ALT (U/L)   Date Value   02/19/2020 <5 (L)     BUN (mg/dL)   Date Value   02/19/2020 30 (H)      (goal A1C is < 7)   (goal LDL is <100) need 30-50% reduction from baseline     BP Readings from Last 3 Encounters:   02/19/20 111/69   02/10/20 117/60   02/06/20 126/82    (goal /80)      All Future Testing planned in [facility]:  Lab Frequency Next Occurrence   Comprehensive Metabolic Panel Once 25/46/7115   CBC With Auto Differential Once 09/30/2019   CT ABDOMEN PELVIS WO CONTRAST Additional Contrast? None Once 10/15/2019   XR CHEST 1 VIEW Once 70/81/3326   Basic Metabolic Panel Once 51/66/1425   Urine Culture Once 11/21/2020   CT UROGRAM Once 02/19/2020   CT CHEST W CONTRAST Once 02/19/2020   CT CHEST ABDOMEN PELVIS WO CONTRAST Once 34/31/6996   Basic Metabolic Panel Once 28/67/5215   CBC With Auto Differential     Comprehensive Metabolic Panel     Magnesium     CBC Auto Differential     Comprehensive

## 2020-04-24 RX ORDER — BLOOD-GLUCOSE METER
KIT MISCELLANEOUS
Qty: 100 STRIP | Refills: 4 | Status: SHIPPED | OUTPATIENT
Start: 2020-04-24 | End: 2020-05-07 | Stop reason: SDUPTHER

## 2020-04-24 NOTE — TELEPHONE ENCOUNTER
Comprehensive Metabolic Panel     PSA, Diagnostic     CBC Auto Differential     Comprehensive Metabolic Panel         Next Visit Date:  No future appointments. Patient Active Problem List:     AAA (abdominal aortic aneurysm) (HCC)     Hypertension     Hyperlipidemia     OA (osteoarthritis) of hip     Colon polyps     Gout     GERD (gastroesophageal reflux disease)     Oropharyngeal dysphagia     Post laminectomy syndrome     Tobacco use     Ventral hernia     Obese     Chronic back pain sees Dr Fior Zambrano     DDD (degenerative disc disease), lumbar     Diabetic nephropathy with proteinuria ([address]     Type 2 diabetes with nephropathy (HCC)     Mitral regurgitation     Hx of CABG     Macrocytosis without anemia     Coronary artery disease involving native coronary artery of native heart without angina pectoris     Ear lesion     Chondrodermatitis nodularis chronica helicis     Actinic keratosis     Neoplasm of uncertain behavior of skin     CHF (congestive heart failure) (HCC)     CAD (coronary artery disease)     Obesity (BMI 30-39. 9)     History of congestive heart failure     Central sleep apnea     Malignant neoplasm of urinary bladder (HCC)     Bladder cancer (HCC)     MAVERICK (acute kidney injury) ([address]     Stage 3 chronic kidney disease (HCC)     Acute metabolic encephalopathy

## 2020-04-30 ENCOUNTER — TELEPHONE (OUTPATIENT)
Dept: UROLOGY | Age: 75
End: 2020-04-30

## 2020-04-30 NOTE — TELEPHONE ENCOUNTER
Pt requesting to be seen due to \"stitch sticking out below my stoma\". Pt states he will be moving to [facility] 6/1/20 and was hoping to come to the office to get stitch removed before move.

## 2020-05-06 ENCOUNTER — OFFICE VISIT (OUTPATIENT)
Dept: UROLOGY | Age: 75
End: 2020-05-06
Payer: MEDICARE

## 2020-05-06 PROCEDURE — G8417 CALC BMI ABV UP PARAM F/U: HCPCS | Performed by: NURSE PRACTITIONER

## 2020-05-06 PROCEDURE — 99213 OFFICE O/P EST LOW 20 MIN: CPT | Performed by: NURSE PRACTITIONER

## 2020-05-06 PROCEDURE — 4040F PNEUMOC VAC/ADMIN/RCVD: CPT | Performed by: NURSE PRACTITIONER

## 2020-05-06 PROCEDURE — 4004F PT TOBACCO SCREEN RCVD TLK: CPT | Performed by: NURSE PRACTITIONER

## 2020-05-06 PROCEDURE — 1123F ACP DISCUSS/DSCN MKR DOCD: CPT | Performed by: NURSE PRACTITIONER

## 2020-05-06 PROCEDURE — 3017F COLORECTAL CA SCREEN DOC REV: CPT | Performed by: NURSE PRACTITIONER

## 2020-05-06 PROCEDURE — G8427 DOCREV CUR MEDS BY ELIG CLIN: HCPCS | Performed by: NURSE PRACTITIONER

## 2020-05-06 ASSESSMENT — ENCOUNTER SYMPTOMS
EYE PAIN: 0
NAUSEA: 0
ABDOMINAL PAIN: 0
COLOR CHANGE: 0
WHEEZING: 0
COUGH: 0
BACK PAIN: 0
SHORTNESS OF BREATH: 0
EYE REDNESS: 0
VOMITING: 0

## 2020-05-06 NOTE — PROGRESS NOTES
Full dentures     Upper only    GERD (gastroesophageal reflux disease) 7/10/2014    Gout 2/7/2014    Heart attack ([facility] 1984    History of blood transfusion     6 weeks agopplateletts only    Hx of CABG 12/9/2015    Hyperlipidemia     Hypertension     On Rx.     Mitral regurgitation 12/9/2015    Oropharyngeal dysphagia 7/10/2014    Post laminectomy syndrome 9/24/2014    Tobacco use 11/25/2014    Type 2 diabetes with nephropathy ([address] 11/20/2015    Wears glasses      Past Surgical History:   Procedure Laterality Date    ABDOMINAL ADHESION SURGERY  10/02/2019    LAPAROSCOPIC ROBOTIC LYSIS OF ADHESIONS     ABDOMINAL AORTIC ANEURYSM REPAIR  12/02    ARTERIAL BYPASS SURGRY  1984    Double    BACK SURGERY  2000, 2003    lumbar    BLADDER REMOVAL  10/02/2019    LAPAROSCOPIC ROBOTIC RADICAL CYSTECTOMY, ILEOCONDUIT CREATION     BLADDER REMOVAL N/A 10/2/2019    XI LAPAROSCOPIC ROBOTIC RADICAL CYSTECTOMY, ILEOCONDUIT CREATION performed by Rufus Brown MD at [place] [address], 12/84    COLONOSCOPY      CORONARY ARTERY BYPASS GRAFT  12/1984    CYSTOSCOPY  02/05/2018    RT RETROGRADE TUR-BT WITH MITOMYCIN INSTILLATION    CYSTOSCOPY N/A 5/21/2019    CYSTOSCOPY TRANSURETHRAL RESECTION BLADDER - CYSTO TUR BLADDER TUMOR performed by Debrah Bamberger, MD at [facility] N/A 6/27/2019    CYSTOSCOPY TRANSURETHRAL RESECTION BLADDER TUMOR performed by Rufus Brown MD at [facility] Right 2017    cyst removal     HEMICOLECTOMY N/A 10/2/2019    XI LAPAROSCOPIC ROBOTIC LYSIS OF ADHESIONS performed by Isiaah Burris IV,  at [address]      ventral    HIATAL HERNIA REPAIR  12/1980    Hiatal    KNEE ARTHROSCOPY Left 2005    KNEE ARTHROSCOPY Right 2006    KNEE SURGERY Left 2008    MALIGNANT SKIN LESION EXCISION Right 04/27/2017    Dr. Bettina Hodgkin  2004    abdominal muscle repair by Dr. Moriah Ramirez Left 2012    MI

## 2020-05-07 ENCOUNTER — TELEPHONE (OUTPATIENT)
Dept: FAMILY MEDICINE CLINIC | Age: 75
End: 2020-05-07

## 2020-05-07 ENCOUNTER — OFFICE VISIT (OUTPATIENT)
Dept: PRIMARY CARE CLINIC | Age: 75
End: 2020-05-07
Payer: MEDICARE

## 2020-05-07 VITALS
RESPIRATION RATE: 16 BRPM | HEIGHT: 71 IN | SYSTOLIC BLOOD PRESSURE: 110 MMHG | DIASTOLIC BLOOD PRESSURE: 62 MMHG | BODY MASS INDEX: 29.12 KG/M2 | WEIGHT: 208 LBS | HEART RATE: 74 BPM

## 2020-05-07 PROBLEM — D70.1 CHEMOTHERAPY-INDUCED NEUTROPENIA (HCC): Status: ACTIVE | Noted: 2020-05-07

## 2020-05-07 PROBLEM — T45.1X5A CHEMOTHERAPY-INDUCED NEUTROPENIA (HCC): Status: ACTIVE | Noted: 2020-05-07

## 2020-05-07 PROCEDURE — 3017F COLORECTAL CA SCREEN DOC REV: CPT | Performed by: NURSE PRACTITIONER

## 2020-05-07 PROCEDURE — 1123F ACP DISCUSS/DSCN MKR DOCD: CPT | Performed by: NURSE PRACTITIONER

## 2020-05-07 PROCEDURE — 4004F PT TOBACCO SCREEN RCVD TLK: CPT | Performed by: NURSE PRACTITIONER

## 2020-05-07 PROCEDURE — G8428 CUR MEDS NOT DOCUMENT: HCPCS | Performed by: NURSE PRACTITIONER

## 2020-05-07 PROCEDURE — 99214 OFFICE O/P EST MOD 30 MIN: CPT | Performed by: NURSE PRACTITIONER

## 2020-05-07 PROCEDURE — 4040F PNEUMOC VAC/ADMIN/RCVD: CPT | Performed by: NURSE PRACTITIONER

## 2020-05-07 PROCEDURE — G8417 CALC BMI ABV UP PARAM F/U: HCPCS | Performed by: NURSE PRACTITIONER

## 2020-05-07 RX ORDER — POTASSIUM CHLORIDE 20 MEQ/1
TABLET, EXTENDED RELEASE ORAL
Qty: 90 TABLET | Refills: 1 | Status: SHIPPED | OUTPATIENT
Start: 2020-05-07

## 2020-05-07 RX ORDER — ATENOLOL 25 MG/1
TABLET ORAL
Qty: 90 TABLET | Refills: 1 | Status: SHIPPED | OUTPATIENT
Start: 2020-05-07 | End: 2020-08-06

## 2020-05-07 RX ORDER — ISOSORBIDE MONONITRATE 60 MG/1
60 TABLET, EXTENDED RELEASE ORAL DAILY
Qty: 90 TABLET | Refills: 1 | Status: SHIPPED | OUTPATIENT
Start: 2020-05-07 | End: 2020-05-22

## 2020-05-07 RX ORDER — ATORVASTATIN CALCIUM 40 MG/1
TABLET, FILM COATED ORAL
Qty: 90 TABLET | Refills: 1 | Status: SHIPPED | OUTPATIENT
Start: 2020-05-07 | End: 2020-08-06

## 2020-05-07 RX ORDER — ALLOPURINOL 100 MG/1
TABLET ORAL
Qty: 90 TABLET | Refills: 1 | Status: SHIPPED | OUTPATIENT
Start: 2020-05-07 | End: 2020-08-06

## 2020-05-07 RX ORDER — LANSOPRAZOLE 30 MG/1
CAPSULE, DELAYED RELEASE ORAL
Qty: 90 CAPSULE | Refills: 1 | Status: SHIPPED | OUTPATIENT
Start: 2020-05-07

## 2020-05-07 RX ORDER — BLOOD-GLUCOSE METER
KIT MISCELLANEOUS
Qty: 100 STRIP | Refills: 4 | Status: SHIPPED | OUTPATIENT
Start: 2020-05-07

## 2020-05-07 RX ORDER — ONDANSETRON HYDROCHLORIDE 8 MG/1
8 TABLET, FILM COATED ORAL EVERY 12 HOURS PRN
Qty: 60 TABLET | Refills: 2 | Status: SHIPPED | OUTPATIENT
Start: 2020-05-07

## 2020-05-07 RX ORDER — BACLOFEN 20 MG
500 TABLET ORAL DAILY
Qty: 90 TABLET | Refills: 1 | Status: SHIPPED | OUTPATIENT
Start: 2020-05-07

## 2020-05-07 RX ORDER — FUROSEMIDE 80 MG
TABLET ORAL
Qty: 90 TABLET | Refills: 1 | Status: SHIPPED | OUTPATIENT
Start: 2020-05-07 | End: 2020-08-06

## 2020-05-07 RX ORDER — SULFAMETHOXAZOLE AND TRIMETHOPRIM 800; 160 MG/1; MG/1
1 TABLET ORAL DAILY
Qty: 90 TABLET | Refills: 1 | Status: SHIPPED | OUTPATIENT
Start: 2020-05-07

## 2020-05-07 RX ORDER — RANOLAZINE 500 MG/1
TABLET, EXTENDED RELEASE ORAL
Qty: 180 TABLET | Refills: 1 | Status: SHIPPED | OUTPATIENT
Start: 2020-05-07 | End: 2020-11-23

## 2020-05-07 RX ORDER — CAPTOPRIL 25 MG/1
TABLET ORAL
Qty: 270 TABLET | Refills: 1 | Status: SHIPPED | OUTPATIENT
Start: 2020-05-07

## 2020-05-07 RX ORDER — SERTRALINE HYDROCHLORIDE 25 MG/1
25 TABLET, FILM COATED ORAL DAILY
Qty: 90 TABLET | Refills: 1 | Status: SHIPPED | OUTPATIENT
Start: 2020-05-07

## 2020-05-07 RX ORDER — ONDANSETRON HYDROCHLORIDE 8 MG/1
8 TABLET, FILM COATED ORAL EVERY 12 HOURS PRN
Qty: 60 TABLET | Refills: 2 | Status: SHIPPED | OUTPATIENT
Start: 2020-05-07 | End: 2020-05-07 | Stop reason: SDUPTHER

## 2020-05-07 RX ORDER — LANCETS 28 GAUGE
EACH MISCELLANEOUS
Qty: 300 EACH | Refills: 1 | Status: SHIPPED | OUTPATIENT
Start: 2020-05-07

## 2020-05-07 ASSESSMENT — ENCOUNTER SYMPTOMS
ABDOMINAL PAIN: 0
SHORTNESS OF BREATH: 0
COUGH: 0
BACK PAIN: 0

## 2020-05-07 NOTE — PROGRESS NOTES
[address] PRIMARY CARE  [address] [facility] [address]  [address] [address]  Dept: [phone]  Dept Fax: [phone]    Sunshine Machuca is a 75 y.o. male who presentstoday for his medical conditions/complaints as noted below.   Sunshine Machuca is c/o of  Chief Complaint   Patient presents with    Medication Check     3 month recheck           HPI:     Presents for 3 month recheck on chronic conditions  Weight is stable  BP well controlled    Plans to be moving on 20 to [facility] area, to be closer to family  Requesting printed copy of records and printed scripts to go with him  States zoloft has improved his mood    Herniated stoma, following with Dr. Shahnaz Bledsoe to be repaired, he is tolerating well    Unhealing lesion to lower abdomen  Dr. Salomon Letters placed him on bactrim daily for life per patient  Unable to repair mesh that is infected d/t being high risk for surgery    Denies any other problems/concerns      Hemoglobin A1C (%)   Date Value   02/10/2020 6.2   10/02/2019 6.5 (H)   2019 7.0 (H)             ( goal A1C is < 7)   No results found for: LABMICR  LDL Cholesterol (mg/dL)   Date Value   2018 61     LDL Calculated (mg/dL)   Date Value   2015 74   2014 60   2013 78       (goal LDL is <100)   AST (U/L)   Date Value   2020 10     ALT (U/L)   Date Value   2020 <5 (L)     BUN (mg/dL)   Date Value   2020 30 (H)     BP Readings from Last 3 Encounters:   20 110/62   20 111/69   02/10/20 117/60          ([id number])    Past Medical History:   Diagnosis Date    AAA (abdominal aortic aneurysm) ([facility]     Dr Kwasi Avila, repaired    CAD (coronary artery disease)     Dr Morris [facility]- clearance obtained-patient states    Cancer [facility])     bladder    Carotid artery stenosis     Central sleep apnea 10/16/2017    CHF (congestive heart failure) ([facility]     Colon polyps     Dr Symone Borrero DDD (degenerative disc disease), lumbar 11/3/2015    repair by Dr. Shannon Mcmanus Left 2012    MO CYSTOURETHROSCOPY,FULGUR .5-2CM MARJORIE N/A 2/5/2018    CYSTO, BILATERAL RETROGRADE PYELOGRAM, TUR BLADDER TUMOR WITH GYRUS INSTALLATION OF MITOMYCIN performed by Elmer Puri MD at [address] TUMOR REMOVAL Right 2009    neck, benign    VENTRAL HERNIA REPAIR Left 10/1997    Ventral    WRIST GANGLION EXCISION Left 2008       Family History   Problem Relation Age of Onset    Breast Cancer Mother     High Blood Pressure Father     Heart Disease Father     Stroke Father     Heart Disease Brother     Heart Surgery Brother         Bypass          Social History     Tobacco Use    Smoking status: Current Every Day Smoker     Packs/day: 0.50     Years: 50.00     Pack years: 25.00     Types: Cigarettes     Start date: 1967    Smokeless tobacco: Never Used    Tobacco comment: quit 2 weeks ago   Substance Use Topics    Alcohol use: No      Current Outpatient Medications   Medication Sig Dispense Refill    blood glucose test strips (FREESTYLE LITE) strip USE TO TEST BLOOD SUGARS THREE TIMES A  strip 4    metFORMIN (GLUCOPHAGE) 500 MG tablet Take 1 tablet by mouth 2 times daily (with meals) 180 tablet 1    captopril (CAPOTEN) 25 MG tablet TAKE ONE TABLET BY MOUTH THREE TIMES A  tablet 1    lansoprazole (PREVACID) 30 MG delayed release capsule TAKE ONE CAPSULE BY MOUTH DAILY 90 capsule 1    ranolazine (RANEXA) 500 MG extended release tablet TAKE ONE TABLET BY MOUTH TWICE A  tablet 1    sertraline (ZOLOFT) 25 MG tablet Take 1 tablet by mouth daily 90 tablet 1    atorvastatin (LIPITOR) 40 MG tablet TAKE ONE TABLET BY MOUTH DAILY 90 tablet 1    furosemide (LASIX) 80 MG tablet TAKE ONE TABLET BY MOUTH DAILY 90 tablet 1    allopurinol (ZYLOPRIM) 100 MG tablet TAKE ONE TABLET BY MOUTH DAILY 90 tablet 1    atenolol (TENORMIN) 25 MG tablet TAKE ONE TABLET BY MOUTH DAILY 90 tablet 1    potassium chloride (KLOR-CON M) 20 MEQ extended release tablet TAKE ONE TABLET BY MOUTH DAILY 90 tablet 1    FreeStyle Lancets MISC USE THREE DAILY TO TEST BLOOD SUGAR 300 each 1    isosorbide mononitrate (IMDUR) 60 MG extended release tablet Take 1 tablet by mouth daily TAKE ONE TABLET BY MOUTH DAILY 90 tablet 1    Magnesium Oxide 500 MG TABS Take 500 mg by mouth daily 90 tablet 1    ondansetron (ZOFRAN) 8 MG tablet Take 1 tablet by mouth every 12 hours as needed for Nausea or Vomiting 60 tablet 2    morphine (MS CONTIN) 30 MG extended release tablet Take 30 mg by mouth 3 times daily.  Blood Glucose Monitoring Suppl ROMY Glucometer- Free Style Lite 1 Device 0     No current facility-administered medications for this visit. Facility-Administered Medications Ordered in Other Visits   Medication Dose Route Frequency Provider Last Rate Last Dose    sodium chloride flush 0.9 % injection 10 mL  10 mL Intravenous BID David Méndez MD         Allergies   Allergen Reactions    Baclofen Shortness Of Breath    Methadone Shortness Of Breath    Iodides Nausea And Vomiting       Health Maintenance   Topic Date Due    DTaP/Tdap/Td vaccine (1 - Tdap) 11/25/1963    Shingles Vaccine (2 of 3) 06/25/2015    Diabetic retinal exam  04/12/2017    Diabetic foot exam  02/01/2018    Lipid screen  08/01/2019    PSA counseling  01/08/2021    A1C test (Diabetic or Prediabetic)  02/10/2021    Annual Wellness Visit (AWV)  02/10/2021    Potassium monitoring  02/19/2021    Creatinine monitoring  02/19/2021    Colon cancer screen colonoscopy  05/13/2021    Flu vaccine  Completed    Pneumococcal 65+ years Vaccine  Completed    Hepatitis A vaccine  Aged Out    Hib vaccine  Aged Out    Meningococcal (ACWY) vaccine  Aged Out       Subjective:      Review of Systems   Constitutional: Negative for chills, fatigue and fever. HENT: Negative for congestion. Eyes: Negative for visual disturbance.    Respiratory: Negative for cough and shortness of breath. Cardiovascular: Negative for chest pain and palpitations. Gastrointestinal: Negative for abdominal pain. Genitourinary: Negative for difficulty urinating and dysuria. Musculoskeletal: Negative for arthralgias and back pain. Skin: Positive for wound. Neurological: Negative for dizziness and headaches. Psychiatric/Behavioral: Negative for self-injury, sleep disturbance and suicidal ideas. The patient is not nervous/anxious. Objective:     Physical Exam  Vitals signs and nursing note reviewed. Constitutional:       Appearance: He is well-developed. HENT:      Head: Normocephalic and atraumatic. Eyes:      Pupils: Pupils are equal, round, and reactive to light. Neck:      Musculoskeletal: Normal range of motion. Cardiovascular:      Rate and Rhythm: Normal rate and regular rhythm. Heart sounds: Normal heart sounds. Pulmonary:      Effort: Pulmonary effort is normal.      Breath sounds: Normal breath sounds. Abdominal:      General: Bowel sounds are normal.      Palpations: Abdomen is soft. Tenderness: There is no abdominal tenderness. Musculoskeletal: Normal range of motion. Skin:     General: Skin is warm and dry. Neurological:      Mental Status: He is alert and oriented to person, place, and time. Psychiatric:         Behavior: Behavior normal.         Thought Content: Thought content normal.         Judgment: Judgment normal.       /62   Pulse 74   Resp 16   Ht 5' 11\" (1.803 m)   Wt 208 lb (94.3 kg)   BMI 29.01 kg/m²     Assessment:       Diagnosis Orders   1. Type II diabetes mellitus with nephropathy (HCC)  blood glucose test strips (FREESTYLE LITE) strip    FreeStyle Lancets MISC   2. Coronary artery disease involving native coronary artery of native heart, angina presence unspecified  captopril (CAPOTEN) 25 MG tablet   3.  Essential hypertension  captopril (CAPOTEN) 25 MG tablet    potassium chloride (KLOR-CON M) 20 MEQ extended release

## 2020-05-14 ENCOUNTER — TELEPHONE (OUTPATIENT)
Dept: ONCOLOGY | Age: 75
End: 2020-05-14

## 2020-05-18 ENCOUNTER — TELEPHONE (OUTPATIENT)
Dept: UROLOGY | Age: 75
End: 2020-05-18

## 2020-05-22 RX ORDER — ISOSORBIDE MONONITRATE 60 MG/1
TABLET, EXTENDED RELEASE ORAL
Qty: 90 TABLET | Refills: 0 | Status: SHIPPED | OUTPATIENT
Start: 2020-05-22

## 2020-05-22 NOTE — TELEPHONE ENCOUNTER
Diagnostic     CBC Auto Differential     Comprehensive Metabolic Panel         Next Visit Date:  No future appointments. Patient Active Problem List:     AAA (abdominal aortic aneurysm) (HCC)     Hypertension     Hyperlipidemia     OA (osteoarthritis) of hip     Colon polyps     Gout     GERD (gastroesophageal reflux disease)     Oropharyngeal dysphagia     Post laminectomy syndrome     Tobacco use     Ventral hernia     Obese     Chronic back pain sees Dr Blum Common     DDD (degenerative disc disease), lumbar     Diabetic nephropathy with proteinuria ([address]     Type 2 diabetes with nephropathy (HCC)     Mitral regurgitation     Hx of CABG     Macrocytosis without anemia     Coronary artery disease involving native coronary artery of native heart without angina pectoris     Ear lesion     Chondrodermatitis nodularis chronica helicis     Actinic keratosis     Neoplasm of uncertain behavior of skin     CHF (congestive heart failure) (HCC)     CAD (coronary artery disease)     Obesity (BMI 30-39. 9)     History of congestive heart failure     Central sleep apnea     Malignant neoplasm of urinary bladder (HCC)     Bladder cancer (HCC)     MAVERICK (acute kidney injury) ([address]     Stage 3 chronic kidney disease ([address]     Acute metabolic encephalopathy     Chemotherapy-induced neutropenia (HCC)

## 2020-06-08 ENCOUNTER — TELEPHONE (OUTPATIENT)
Dept: PRIMARY CARE CLINIC | Age: 75
End: 2020-06-08

## 2020-06-08 RX ORDER — LINACLOTIDE 145 UG/1
CAPSULE, GELATIN COATED ORAL
Qty: 30 CAPSULE | Refills: 2 | Status: SHIPPED | OUTPATIENT
Start: 2020-06-08

## 2020-06-08 NOTE — TELEPHONE ENCOUNTER
Last OV 05/07/2020     Health Maintenance   Topic Date Due    DTaP/Tdap/Td vaccine (1 - Tdap) 11/25/1963    Diabetic retinal exam  04/12/2017    Diabetic foot exam  02/01/2018    Lipid screen  08/01/2019    PSA counseling  01/08/2021    A1C test (Diabetic or Prediabetic)  02/10/2021    Annual Wellness Visit (AWV)  02/10/2021    Potassium monitoring  02/19/2021    Creatinine monitoring  02/19/2021    Colon cancer screen colonoscopy  05/13/2021    Flu vaccine  Completed    Pneumococcal 65+ years Vaccine  Completed    Hepatitis A vaccine  Aged Out    Hib vaccine  Aged Out    Meningococcal (ACWY) vaccine  Aged Out             (applicable per patient's age: Cancer Screenings, Depression Screening, Fall Risk Screening, Immunizations)    Hemoglobin A1C (%)   Date Value   02/10/2020 6.2   10/02/2019 6.5 (H)   07/19/2019 7.0 (H)     LDL Cholesterol (mg/dL)   Date Value   08/01/2018 61     LDL Calculated (mg/dL)   Date Value   11/18/2015 74     AST (U/L)   Date Value   02/19/2020 10     ALT (U/L)   Date Value   02/19/2020 <5 (L)     BUN (mg/dL)   Date Value   02/19/2020 30 (H)      (goal A1C is < 7)   (goal LDL is <100) need 30-50% reduction from baseline     BP Readings from Last 3 Encounters:   05/07/20 110/62   02/19/20 111/69   02/10/20 117/60    (goal /80)      All Future Testing planned in [facility]:  Lab Frequency Next Occurrence   Comprehensive Metabolic Panel Once 47/19/1943   CBC With Auto Differential Once 09/30/2019   CT ABDOMEN PELVIS WO CONTRAST Additional Contrast? None Once 10/15/2019   XR CHEST 1 VIEW Once 11/41/9632   Basic Metabolic Panel Once 90/91/4274   Urine Culture Once 11/21/2020   CT UROGRAM Once 02/19/2020   CT CHEST W CONTRAST Once 02/19/2020   CT CHEST ABDOMEN PELVIS WO CONTRAST Once 20/65/4233   Basic Metabolic Panel Once 47/45/0646   CBC With Auto Differential     Comprehensive Metabolic Panel     Magnesium     CBC Auto Differential     Comprehensive Metabolic Panel     PSA,

## 2020-07-17 NOTE — TELEPHONE ENCOUNTER
Last OV 05/07/2020    Health Maintenance   Topic Date Due    DTaP/Tdap/Td vaccine (1 - Tdap) 11/25/1963    Diabetic retinal exam  04/12/2017    Diabetic foot exam  02/01/2018    Lipid screen  08/01/2019    Flu vaccine (1) 09/01/2020    PSA counseling  01/08/2021    A1C test (Diabetic or Prediabetic)  02/10/2021    Annual Wellness Visit (AWV)  02/10/2021    Potassium monitoring  02/19/2021    Creatinine monitoring  02/19/2021    Colon cancer screen colonoscopy  05/13/2021    Pneumococcal 65+ years Vaccine  Completed    Hepatitis A vaccine  Aged Out    Hib vaccine  Aged Out    Meningococcal (ACWY) vaccine  Aged Out             (applicable per patient's age: Cancer Screenings, Depression Screening, Fall Risk Screening, Immunizations)    Hemoglobin A1C (%)   Date Value   02/10/2020 6.2   10/02/2019 6.5 (H)   07/19/2019 7.0 (H)     LDL Cholesterol (mg/dL)   Date Value   08/01/2018 61     LDL Calculated (mg/dL)   Date Value   11/18/2015 74     AST (U/L)   Date Value   02/19/2020 10     ALT (U/L)   Date Value   02/19/2020 <5 (L)     BUN (mg/dL)   Date Value   02/19/2020 30 (H)      (goal A1C is < 7)   (goal LDL is <100) need 30-50% reduction from baseline     BP Readings from Last 3 Encounters:   05/07/20 110/62   02/19/20 111/69   02/10/20 117/60    (goal /80)      All Future Testing planned in [facility]:  Lab Frequency Next Occurrence   CBC With Auto Differential Once 09/30/2019   CT ABDOMEN PELVIS WO CONTRAST Additional Contrast? None Once 10/15/2019   XR CHEST 1 VIEW Once 86/85/2029   Basic Metabolic Panel Once 06/73/4756   Urine Culture Once 11/21/2020   CT UROGRAM Once 02/19/2020   CT CHEST W CONTRAST Once 02/19/2020   CT CHEST ABDOMEN PELVIS WO CONTRAST Once 79/25/8873   Basic Metabolic Panel Once 48/89/3969   CBC Auto Differential     Comprehensive Metabolic Panel     PSA, Diagnostic     CBC Auto Differential     Comprehensive Metabolic Panel         Next Visit Date:  No future appointments. Patient Active Problem List:     AAA (abdominal aortic aneurysm) (HCC)     Hypertension     Hyperlipidemia     OA (osteoarthritis) of hip     Colon polyps     Gout     GERD (gastroesophageal reflux disease)     Oropharyngeal dysphagia     Post laminectomy syndrome     Tobacco use     Ventral hernia     Obese     Chronic back pain sees Dr Roxy Lozano     DDD (degenerative disc disease), lumbar     Diabetic nephropathy with proteinuria ([address]     Type 2 diabetes with nephropathy (HCC)     Mitral regurgitation     Hx of CABG     Macrocytosis without anemia     Coronary artery disease involving native coronary artery of native heart without angina pectoris     Ear lesion     Chondrodermatitis nodularis chronica helicis     Actinic keratosis     Neoplasm of uncertain behavior of skin     CHF (congestive heart failure) (HCC)     CAD (coronary artery disease)     Obesity (BMI 30-39. 9)     History of congestive heart failure     Central sleep apnea     Malignant neoplasm of urinary bladder (HCC)     Bladder cancer (HCC)     MAVERICK (acute kidney injury) ([address]     Stage 3 chronic kidney disease ([address]     Acute metabolic encephalopathy     Chemotherapy-induced neutropenia (HCC)

## 2020-08-06 RX ORDER — ATORVASTATIN CALCIUM 40 MG/1
TABLET, FILM COATED ORAL
Qty: 90 TABLET | Refills: 0 | Status: SHIPPED | OUTPATIENT
Start: 2020-08-06 | End: 2021-02-16

## 2020-08-06 RX ORDER — ATENOLOL 25 MG/1
TABLET ORAL
Qty: 90 TABLET | Refills: 0 | Status: SHIPPED | OUTPATIENT
Start: 2020-08-06

## 2020-08-06 RX ORDER — ALLOPURINOL 100 MG/1
TABLET ORAL
Qty: 90 TABLET | Refills: 0 | Status: SHIPPED | OUTPATIENT
Start: 2020-08-06 | End: 2021-02-11 | Stop reason: SDUPTHER

## 2020-08-06 RX ORDER — FUROSEMIDE 80 MG
TABLET ORAL
Qty: 90 TABLET | Refills: 0 | Status: SHIPPED | OUTPATIENT
Start: 2020-08-06

## 2020-08-06 NOTE — TELEPHONE ENCOUNTER
Last OV 05/07/2020      Health Maintenance   Topic Date Due    DTaP/Tdap/Td vaccine (1 - Tdap) 11/25/1963    Diabetic retinal exam  04/12/2017    Diabetic foot exam  02/01/2018    Lipid screen  08/01/2019    Flu vaccine (1) 09/01/2020    PSA counseling  01/08/2021    A1C test (Diabetic or Prediabetic)  02/10/2021    Annual Wellness Visit (AWV)  02/10/2021    Potassium monitoring  02/19/2021    Creatinine monitoring  02/19/2021    Colon cancer screen colonoscopy  05/13/2021    Pneumococcal 65+ years Vaccine  Completed    Hepatitis A vaccine  Aged Out    Hib vaccine  Aged Out    Meningococcal (ACWY) vaccine  Aged Out             (applicable per patient's age: Cancer Screenings, Depression Screening, Fall Risk Screening, Immunizations)    Hemoglobin A1C (%)   Date Value   02/10/2020 6.2   10/02/2019 6.5 (H)   07/19/2019 7.0 (H)     LDL Cholesterol (mg/dL)   Date Value   08/01/2018 61     LDL Calculated (mg/dL)   Date Value   11/18/2015 74     AST (U/L)   Date Value   02/19/2020 10     ALT (U/L)   Date Value   02/19/2020 <5 (L)     BUN (mg/dL)   Date Value   02/19/2020 30 (H)      (goal A1C is < 7)   (goal LDL is <100) need 30-50% reduction from baseline     BP Readings from Last 3 Encounters:   05/07/20 110/62   02/19/20 111/69   02/10/20 117/60    (goal /80)      All Future Testing planned in [facility]:  Lab Frequency Next Occurrence   CBC With Auto Differential Once 09/30/2019   CT ABDOMEN PELVIS WO CONTRAST Additional Contrast? None Once 10/15/2019   XR CHEST 1 VIEW Once 03/18/8960   Basic Metabolic Panel Once 68/89/3723   Urine Culture Once 11/21/2020   CT UROGRAM Once 02/19/2020   CT CHEST W CONTRAST Once 02/19/2020   CT CHEST ABDOMEN PELVIS WO CONTRAST Once 83/78/0545   Basic Metabolic Panel Once 90/95/0377   CBC Auto Differential     Comprehensive Metabolic Panel     PSA, Diagnostic     CBC Auto Differential     Comprehensive Metabolic Panel         Next Visit Date:  No future

## 2020-11-23 RX ORDER — RANOLAZINE 500 MG/1
TABLET, EXTENDED RELEASE ORAL
Qty: 180 TABLET | Refills: 0 | Status: SHIPPED | OUTPATIENT
Start: 2020-11-23

## 2021-02-11 DIAGNOSIS — M1A.0710 IDIOPATHIC CHRONIC GOUT OF RIGHT FOOT WITHOUT TOPHUS: ICD-10-CM

## 2021-02-11 RX ORDER — ALLOPURINOL 100 MG/1
100 TABLET ORAL DAILY
Qty: 90 TABLET | Refills: 1 | Status: SHIPPED | OUTPATIENT
Start: 2021-02-11 | End: 2021-08-16

## 2021-02-16 RX ORDER — ATORVASTATIN CALCIUM 40 MG/1
TABLET, FILM COATED ORAL
Qty: 90 TABLET | Refills: 0 | Status: SHIPPED | OUTPATIENT
Start: 2021-02-16

## 2021-07-15 NOTE — H&P (VIEW-ONLY)
HISTORY and [facility] [id number]         NAME:  Pawan Martinez  MRN: [id number]   YOB: 1945   Date: 6/17/2019   Age: 75 y.o. Gender: male       COMPLAINT AND PRESENT HISTORY:    Margaret Maddox is a 75 yr old male who has bladder cancer,  He is here or Pre Admission Testing He had a cysto 2 months ago and had a new bladder lesion, That showed invasice high grade Urothelial carcinoma with invasion in muscle. Now he is having a second scope to look at that area again,   He ill have a TURP and Cysto June 27 for a re evaluation prior to more extensive treatment    He has no blood in urine  He has nocturia 2x night, Has no abd pain    He had AAA repair in 2005. Dr Jonathon Miranda  He had CABG in 1984 Cardiology is Dr Harvey Blue     He has DM  Glucoses have been running 100-105. He has an incisonal hernia in abd,     He is  Retired,     DIAGNOSTIC RESULTS   Radiology:     EKG:    Labs:  U/A:      PAST MEDICAL HISTORY     Past Medical History:   Diagnosis Date    AAA (abdominal aortic aneurysm) ([address]     Dr Jonathon Miranda, repaired    CAD (coronary artery disease)     Dr Yoni Liz [facility])     bladder    Carotid artery stenosis     Central sleep apnea 10/16/2017    CHF (congestive heart failure) (HCC)     Colon polyps     Dr Celio Marcos DDD (degenerative disc disease), lumbar 11/3/2015    Diabetic nephropathy with proteinuria ([address] 11/20/2015    Full dentures     Upper only    GERD (gastroesophageal reflux disease) 7/10/2014    Gout 2/7/2014    Heart attack ([address] 1984    Hx of CABG 12/9/2015    Hyperlipidemia     Hypertension     On Rx.     Mitral regurgitation 12/9/2015    Oropharyngeal dysphagia 7/10/2014    Post laminectomy syndrome 9/24/2014    Tobacco use 11/25/2014    Type 2 diabetes with nephropathy ([address] 11/20/2015    Wears glasses        Pt denies any history of Diabetes mellitus type 2, hypertension, stroke, heart disease, COPD, Asthma, GERD, HLD, Cancer, release tablet Take 30 mg by mouth 3 times daily.  DULERA 100-5 MCG/ACT inhaler INHALE TWO PUFFS BY MOUTH TWICE A DAY 1 Inhaler 5    captopril (CAPOTEN) 25 MG tablet TAKE ONE TABLET BY MOUTH THREE TIMES A  tablet 3    lansoprazole (PREVACID) 30 MG delayed release capsule TAKE ONE CAPSULE BY MOUTH DAILY 90 capsule 3    atorvastatin (LIPITOR) 40 MG tablet TAKE ONE TABLET BY MOUTH DAILY 90 tablet 3    potassium chloride (KLOR-CON M) 20 MEQ extended release tablet TAKE ONE TABLET BY MOUTH DAILY 90 tablet 3    atenolol (TENORMIN) 25 MG tablet TAKE ONE TABLET BY MOUTH DAILY 90 tablet 3    allopurinol (ZYLOPRIM) 100 MG tablet TAKE ONE TABLET BY MOUTH DAILY 90 tablet 3    Magnesium Oxide 500 MG TABS Take 500 mg by mouth daily 90 tablet 2    ondansetron (ZOFRAN) 8 MG tablet Take 1 tablet by mouth every 12 hours as needed for Nausea or Vomiting 60 tablet 2    FREESTYLE LANCETS MISC USE THREE DAILY TO TEST BLOOD SUGAR 300 each 1    diphenhydrAMINE-APAP, sleep, (TYLENOL PM EXTRA STRENGTH)  MG tablet Take 1 tablet by mouth nightly as needed for Sleep 14 tablet 0    Glucose Blood (BLOOD GLUCOSE TEST STRIPS) STRP Free Style Lite, Use 3 daily. Insulin Dependent 300 strip 5    Blood Glucose Monitoring Suppl ROMY Glucometer- Free Style Lite 1 Device 0    Lancet Device MISC 1 Device by [employer]. (Med.Supl.;Non-Drugs) route 3 times daily To check blood sugars. Free Style Lite 1 Device 0     No current facility-administered medications on file prior to encounter. Review of Systems   Constitutional: Negative for activity change, diaphoresis and fatigue. HENT: Negative for congestion, facial swelling, nosebleeds, sinus pressure and sneezing. Eyes: Negative for pain, discharge and redness. Respiratory: Negative for apnea, cough and shortness of breath. Cardiovascular: Negative for chest pain and leg swelling. Endocrine: Negative for cold intolerance, polydipsia and polyphagia. No

## 2021-08-15 DIAGNOSIS — M1A.0710 IDIOPATHIC CHRONIC GOUT OF RIGHT FOOT WITHOUT TOPHUS: ICD-10-CM

## 2021-08-16 RX ORDER — ALLOPURINOL 100 MG/1
100 TABLET ORAL DAILY
Qty: 90 TABLET | Refills: 1 | Status: SHIPPED | OUTPATIENT
Start: 2021-08-16

## 2022-02-11 DIAGNOSIS — M1A.0710 IDIOPATHIC CHRONIC GOUT OF RIGHT FOOT WITHOUT TOPHUS: ICD-10-CM

## 2022-02-11 RX ORDER — ALLOPURINOL 100 MG/1
100 TABLET ORAL DAILY
Qty: 90 TABLET | Refills: 1 | OUTPATIENT
Start: 2022-02-11

## (undated) DEVICE — SUTURE SZ 0 27IN 5/8 CIR UR-6  TAPER PT VIOLET ABSRB VICRYL J603H

## (undated) DEVICE — GLOVE SURG SZ 65 THK91MIL LTX FREE SYN POLYISOPRENE

## (undated) DEVICE — EVACUATOR URO BLDR W/ ADPT UROVAC

## (undated) DEVICE — STERILE POLYISOPRENE POWDER-FREE SURGICAL GLOVES WITH EMOLLIENT COATING: Brand: PROTEXIS

## (undated) DEVICE — CATHETER URETH 20FR BLLN 30CC 3 W F SPEC INF CTRL BARDX

## (undated) DEVICE — Z INACTIVE USE 2660664 SOLUTION IRRIG 3000ML 0.9% SOD CHL USP UROMATIC PLAS CONT

## (undated) DEVICE — SHEET DRAPE FULL 70X100

## (undated) DEVICE — Device

## (undated) DEVICE — PACK PROCEDURE SURG CYSTO SVMMC LF

## (undated) DEVICE — GOWN,AURORA,NONREINFORCED,LARGE: Brand: MEDLINE

## (undated) DEVICE — GLOVE SURG SZ 75 STD WHT LTX SYN POLYMER BEAD REINF ANTI RL

## (undated) DEVICE — STRAP,POSITIONING,KNEE/BODY,FOAM,4X60": Brand: MEDLINE

## (undated) DEVICE — CONE TIP URETERAL CATHETER WITH OPEN-END: Brand: CONE TIP

## (undated) DEVICE — 60 ML SYRINGE LUER-LOCK TIP: Brand: MONOJECT

## (undated) DEVICE — SOLUTION IV IRRIG WATER 1000ML POUR BRL 2F7114

## (undated) DEVICE — SUTURE VCRL SZ 4-0 L27IN ABSRB UD L17MM RB-1 1/2 CIR J214H

## (undated) DEVICE — CHLORAPREP 26ML ORANGE

## (undated) DEVICE — Z INACTIVE USE 2635503 SOLUTION IRRIG 3000ML ST H2O USP UROMATIC PLAS CONT

## (undated) DEVICE — SUTURE CHROMIC GUT SZ 3-0 L27IN ABSRB BRN L26MM SH 1/2 CIR G122H

## (undated) DEVICE — YANKAUER,FLEXIBLE HANDLE,REGLR CAPACITY: Brand: MEDLINE INDUSTRIES, INC.

## (undated) DEVICE — SUTURE PERMAHAND SZ 3-0 L18IN NONABSORBABLE BLK L26MM SH C013D

## (undated) DEVICE — Y-TYPE TUR/BLADDER IRRIGATION SET, REGULATING CLAMP

## (undated) DEVICE — CONTAINER,SPECIMEN,4OZ,OR STRL: Brand: MEDLINE

## (undated) DEVICE — 40580 - THE PINK PAD - ADVANCED TRENDELENBURG POSITIONING KIT: Brand: 40580 - THE PINK PAD - ADVANCED TRENDELENBURG POSITIONING KIT

## (undated) DEVICE — SUTURE PROL SZ 1 L60IN NONABSORBABLE BLU L65MM TP-1 3/8 CIR 8824G

## (undated) DEVICE — Z DUP USE 2423435 ELECTRODE VPR HALF MOON BPLR BALL END 24/26FR

## (undated) DEVICE — SUTURE VCRL + SZ 1 L18IN ABSRB VLT L36MM CT-1 1/2 CIR VCP741D

## (undated) DEVICE — BAG URIN DRNGE 2000ML VYN INF CTRL GRAD ANTI REFLX VLV

## (undated) DEVICE — SYRINGE,TOOMEY,IRRIGATION,70CC,STERILE: Brand: MEDLINE

## (undated) DEVICE — SPONGE LAP W18XL18IN WHT COT 4 PLY FLD STRUNG RADPQ DISP ST

## (undated) DEVICE — SUTURE NONABSORBABLE MONOFILAMENT 3-0 PS-1 18 IN BLK ETHILON 1663H

## (undated) DEVICE — COUNTER NDL 40 COUNT HLD 70 FOAM BLK ADH W/ MAG

## (undated) DEVICE — SUTURE PERMAHAND SZ 3-0 L30IN NONABSORBABLE BLK SH L26MM K832H

## (undated) DEVICE — GLOVE ORANGE PI 7 1/2   MSG9075

## (undated) DEVICE — SUTURE VCRL SZ 2-0 L27IN ABSRB UD L26MM SH 1/2 CIR J417H

## (undated) DEVICE — ST CHARLES CYSTO PACK: Brand: MEDLINE INDUSTRIES, INC.

## (undated) DEVICE — PACK PROCEDURE SURG FACILITY SPEC GI BWL SPT SVMMC

## (undated) DEVICE — GUIDEWIRE VASC L150CM DIA0.035IN TAPR 3CM HYDRPHLC NIT ANG

## (undated) DEVICE — CATHETER PLUG WITH CAP: Brand: DOVER

## (undated) DEVICE — STRAP,CATHETER,ELASTIC,HOOK&LOOP: Brand: MEDLINE

## (undated) DEVICE — SUTURE MCRYL 5-0 L27IN ABSRB VLT RB-1 L17MM 1/2 CIR Y303H

## (undated) DEVICE — SUTURE MCRYL SZ 4-0 L18IN ABSRB UD L16MM PC-3 3/8 CIR PRIM Y845G

## (undated) DEVICE — Z INACTIVE PER BARD SYRINGE IRRIG 70ML PLASTICTOOMEY DISPOSABLE

## (undated) DEVICE — TUBING, SUCTION, 9/32" X 20', STRAIGHT: Brand: MEDLINE INDUSTRIES, INC.

## (undated) DEVICE — DALE FOLEY CATHETER HOLDER, LEGBAND, FITS UP TO 30": Brand: DALE FOLEY CATHETER HOLDER

## (undated) DEVICE — DRAINBAG,ANTI-REFLUX TOWER,L/F,2000ML,LL: Brand: MEDLINE

## (undated) DEVICE — TOWEL,OR,DSP,ST,NATURAL,DLX,4/PK,20PK/CS: Brand: MEDLINE

## (undated) DEVICE — Device: Brand: OLYMPUS

## (undated) DEVICE — SUTURE VCRL SZ 1 L18IN ABSRB VLT CT-1 L36MM 1/2 CIR J741D

## (undated) DEVICE — SUTURE VCRL SZ 3-0 L27IN ABSRB UD L26MM SH 1/2 CIR J416H

## (undated) DEVICE — PROTECTOR ULN NRV PUR FOAM HK LOOP STRP ANATOMICALLY

## (undated) DEVICE — LOOP VES W25MM THK1MM MAXI RED SIL FLD REPELLENT 100 PER

## (undated) DEVICE — SUTURE PERMAHAND SZ 0 L30IN NONABSORBABLE BLK L26MM SH 1/2 K834H

## (undated) DEVICE — SUTURE CHROMIC GUT SZ 4-0 L27IN ABSRB BRN L17MM RB-1 1/2 U203H

## (undated) DEVICE — PLUG,CATHETER,DRAINAGE PROTECTOR,TUBE: Brand: MEDLINE

## (undated) DEVICE — DRAPE,REIN 53X77,STERILE: Brand: MEDLINE